# Patient Record
Sex: FEMALE | Race: WHITE | Employment: FULL TIME | URBAN - METROPOLITAN AREA
[De-identification: names, ages, dates, MRNs, and addresses within clinical notes are randomized per-mention and may not be internally consistent; named-entity substitution may affect disease eponyms.]

---

## 2017-01-13 ENCOUNTER — OFFICE VISIT (OUTPATIENT)
Dept: FAMILY MEDICINE CLINIC | Age: 57
End: 2017-01-13

## 2017-01-13 VITALS
HEART RATE: 108 BPM | WEIGHT: 208.4 LBS | BODY MASS INDEX: 36.93 KG/M2 | OXYGEN SATURATION: 96 % | HEIGHT: 63 IN | DIASTOLIC BLOOD PRESSURE: 80 MMHG | SYSTOLIC BLOOD PRESSURE: 120 MMHG

## 2017-01-13 DIAGNOSIS — C67.3 MALIGNANT NEOPLASM OF ANTERIOR WALL OF URINARY BLADDER (HCC): ICD-10-CM

## 2017-01-13 DIAGNOSIS — L71.9 ROSACEA: ICD-10-CM

## 2017-01-13 PROCEDURE — 99213 OFFICE O/P EST LOW 20 MIN: CPT | Performed by: FAMILY MEDICINE

## 2017-01-13 ASSESSMENT — ENCOUNTER SYMPTOMS
RHINORRHEA: 0
COUGH: 0
SORE THROAT: 0
DIARRHEA: 0
SHORTNESS OF BREATH: 0

## 2017-01-20 ENCOUNTER — HOSPITAL ENCOUNTER (OUTPATIENT)
Dept: OTHER | Age: 57
Discharge: HOME | End: 2017-01-20
Attending: INTERNAL MEDICINE | Admitting: INTERNAL MEDICINE

## 2017-01-20 DIAGNOSIS — E78.5 HYPERLIPIDEMIA, UNSPECIFIED HYPERLIPIDEMIA TYPE: ICD-10-CM

## 2017-01-20 DIAGNOSIS — E89.0 POSTOPERATIVE HYPOTHYROIDISM: ICD-10-CM

## 2017-01-20 DIAGNOSIS — E55.9 VITAMIN D DEFICIENCY: ICD-10-CM

## 2017-01-20 LAB
A/G RATIO: 1.7 (ref 1.1–2.2)
ALBUMIN SERPL-MCNC: 4.5 G/DL (ref 3.4–5)
ALP BLD-CCNC: 75 U/L (ref 40–129)
ALT SERPL-CCNC: 22 U/L (ref 10–40)
ANION GAP SERPL CALCULATED.3IONS-SCNC: 16 MMOL/L (ref 3–16)
AST SERPL-CCNC: 16 U/L (ref 15–37)
BILIRUB SERPL-MCNC: 0.4 MG/DL (ref 0–1)
BUN BLDV-MCNC: 14 MG/DL (ref 7–20)
CALCIUM SERPL-MCNC: 9.5 MG/DL (ref 8.3–10.6)
CHLORIDE BLD-SCNC: 103 MMOL/L (ref 99–110)
CHOLESTEROL, TOTAL: 139 MG/DL (ref 0–199)
CO2: 24 MMOL/L (ref 21–32)
CREAT SERPL-MCNC: <0.5 MG/DL (ref 0.6–1.1)
GFR AFRICAN AMERICAN: >60
GFR NON-AFRICAN AMERICAN: >60
GLOBULIN: 2.7 G/DL
GLUCOSE BLD-MCNC: 125 MG/DL (ref 70–99)
HDLC SERPL-MCNC: 34 MG/DL (ref 40–60)
LDL CHOLESTEROL CALCULATED: 78 MG/DL
POTASSIUM SERPL-SCNC: 4.4 MMOL/L (ref 3.5–5.1)
SODIUM BLD-SCNC: 143 MMOL/L (ref 136–145)
T3 FREE: 3.1 PG/ML (ref 2.3–4.2)
T4 FREE: 1.2 NG/DL (ref 0.9–1.8)
TOTAL PROTEIN: 7.2 G/DL (ref 6.4–8.2)
TRIGL SERPL-MCNC: 137 MG/DL (ref 0–150)
TSH SERPL DL<=0.05 MIU/L-ACNC: 0.14 UIU/ML (ref 0.27–4.2)
VITAMIN D 25-HYDROXY: 40 NG/ML
VLDLC SERPL CALC-MCNC: 27 MG/DL

## 2017-01-21 LAB
ESTIMATED AVERAGE GLUCOSE: 159.9 MG/DL
HBA1C MFR BLD: 7.2 %

## 2017-01-22 LAB — T3 REVERSE: 20.1 NG/DL (ref 9–27)

## 2017-01-23 ENCOUNTER — OFFICE VISIT (OUTPATIENT)
Dept: ENDOCRINOLOGY | Age: 57
End: 2017-01-23

## 2017-01-23 VITALS
SYSTOLIC BLOOD PRESSURE: 124 MMHG | DIASTOLIC BLOOD PRESSURE: 82 MMHG | WEIGHT: 210 LBS | HEART RATE: 77 BPM | BODY MASS INDEX: 37.21 KG/M2 | OXYGEN SATURATION: 98 % | HEIGHT: 63 IN

## 2017-01-23 DIAGNOSIS — G62.9 NEUROPATHY: ICD-10-CM

## 2017-01-23 DIAGNOSIS — E89.0 POSTOPERATIVE HYPOTHYROIDISM: ICD-10-CM

## 2017-01-23 DIAGNOSIS — E78.2 MIXED HYPERLIPIDEMIA: ICD-10-CM

## 2017-01-23 DIAGNOSIS — C67.3 MALIGNANT NEOPLASM OF ANTERIOR WALL OF URINARY BLADDER (HCC): ICD-10-CM

## 2017-01-23 DIAGNOSIS — E55.9 VITAMIN D DEFICIENCY: ICD-10-CM

## 2017-01-23 PROCEDURE — 99215 OFFICE O/P EST HI 40 MIN: CPT | Performed by: INTERNAL MEDICINE

## 2017-02-07 ENCOUNTER — TELEPHONE (OUTPATIENT)
Dept: ENDOCRINOLOGY | Age: 57
End: 2017-02-07

## 2017-04-04 ENCOUNTER — HOSPITAL ENCOUNTER (OUTPATIENT)
Dept: OTHER | Age: 57
Discharge: HOME | End: 2017-04-04
Attending: INTERNAL MEDICINE | Admitting: INTERNAL MEDICINE

## 2017-04-04 DIAGNOSIS — G62.9 NEUROPATHY: ICD-10-CM

## 2017-04-04 DIAGNOSIS — E55.9 VITAMIN D DEFICIENCY: ICD-10-CM

## 2017-04-04 DIAGNOSIS — C67.3 MALIGNANT NEOPLASM OF ANTERIOR WALL OF URINARY BLADDER (HCC): ICD-10-CM

## 2017-04-04 DIAGNOSIS — E78.2 MIXED HYPERLIPIDEMIA: ICD-10-CM

## 2017-04-04 DIAGNOSIS — E89.0 POSTOPERATIVE HYPOTHYROIDISM: ICD-10-CM

## 2017-04-04 LAB
A/G RATIO: 1.8 (ref 1.1–2.2)
ALBUMIN SERPL-MCNC: 4.5 G/DL (ref 3.4–5)
ALP BLD-CCNC: 74 U/L (ref 40–129)
ALT SERPL-CCNC: 17 U/L (ref 10–40)
ANION GAP SERPL CALCULATED.3IONS-SCNC: 18 MMOL/L (ref 3–16)
AST SERPL-CCNC: 13 U/L (ref 15–37)
BILIRUB SERPL-MCNC: 0.4 MG/DL (ref 0–1)
BUN BLDV-MCNC: 15 MG/DL (ref 7–20)
CALCIUM SERPL-MCNC: 9.4 MG/DL (ref 8.3–10.6)
CHLORIDE BLD-SCNC: 102 MMOL/L (ref 99–110)
CHOLESTEROL, TOTAL: 152 MG/DL (ref 0–199)
CO2: 21 MMOL/L (ref 21–32)
CREAT SERPL-MCNC: <0.5 MG/DL (ref 0.6–1.1)
FOLATE: 15.97 NG/ML (ref 4.78–24.2)
GFR AFRICAN AMERICAN: >60
GFR NON-AFRICAN AMERICAN: >60
GLOBULIN: 2.5 G/DL
GLUCOSE BLD-MCNC: 140 MG/DL (ref 70–99)
HDLC SERPL-MCNC: 34 MG/DL (ref 40–60)
HOMOCYSTEINE: 7 UMOL/L (ref 0–10)
LDL CHOLESTEROL CALCULATED: 88 MG/DL
POTASSIUM SERPL-SCNC: 4.3 MMOL/L (ref 3.5–5.1)
SODIUM BLD-SCNC: 141 MMOL/L (ref 136–145)
T3 FREE: 2.8 PG/ML (ref 2.3–4.2)
T4 FREE: 1.2 NG/DL (ref 0.9–1.8)
TOTAL PROTEIN: 7 G/DL (ref 6.4–8.2)
TRIGL SERPL-MCNC: 152 MG/DL (ref 0–150)
TSH SERPL DL<=0.05 MIU/L-ACNC: 0.45 UIU/ML (ref 0.27–4.2)
VITAMIN B-12: 263 PG/ML (ref 211–911)
VITAMIN D 25-HYDROXY: 41.4 NG/ML
VLDLC SERPL CALC-MCNC: 30 MG/DL

## 2017-04-05 ENCOUNTER — OFFICE VISIT (OUTPATIENT)
Dept: ENDOCRINOLOGY | Age: 57
End: 2017-04-05

## 2017-04-05 VITALS
BODY MASS INDEX: 37.7 KG/M2 | WEIGHT: 212.8 LBS | HEART RATE: 75 BPM | HEIGHT: 63 IN | DIASTOLIC BLOOD PRESSURE: 84 MMHG | SYSTOLIC BLOOD PRESSURE: 136 MMHG | OXYGEN SATURATION: 97 %

## 2017-04-05 DIAGNOSIS — Z46.81 INSULIN PUMP TITRATION: ICD-10-CM

## 2017-04-05 DIAGNOSIS — E55.9 VITAMIN D DEFICIENCY: ICD-10-CM

## 2017-04-05 DIAGNOSIS — E78.2 MIXED HYPERLIPIDEMIA: ICD-10-CM

## 2017-04-05 LAB
ESTIMATED AVERAGE GLUCOSE: 162.8 MG/DL
HBA1C MFR BLD: 7.3 %

## 2017-04-05 PROCEDURE — 99215 OFFICE O/P EST HI 40 MIN: CPT | Performed by: INTERNAL MEDICINE

## 2017-04-05 ASSESSMENT — PATIENT HEALTH QUESTIONNAIRE - PHQ9
1. LITTLE INTEREST OR PLEASURE IN DOING THINGS: 0
SUM OF ALL RESPONSES TO PHQ QUESTIONS 1-9: 0
2. FEELING DOWN, DEPRESSED OR HOPELESS: 0
SUM OF ALL RESPONSES TO PHQ9 QUESTIONS 1 & 2: 0

## 2017-04-06 LAB
METHYLMALONIC ACID: 0.13 UMOL/L (ref 0–0.4)
T3 REVERSE: 12.1 NG/DL (ref 9–27)

## 2017-07-19 ENCOUNTER — HOSPITAL ENCOUNTER (OUTPATIENT)
Dept: OTHER | Age: 57
Discharge: HOME | End: 2017-07-19
Attending: INTERNAL MEDICINE | Admitting: INTERNAL MEDICINE

## 2017-07-19 DIAGNOSIS — Z46.81 INSULIN PUMP TITRATION: ICD-10-CM

## 2017-07-19 DIAGNOSIS — E78.2 MIXED HYPERLIPIDEMIA: ICD-10-CM

## 2017-07-19 DIAGNOSIS — E55.9 VITAMIN D DEFICIENCY: ICD-10-CM

## 2017-07-19 LAB
A/G RATIO: 1.7 (ref 1.1–2.2)
ALBUMIN SERPL-MCNC: 4.7 G/DL (ref 3.4–5)
ALP BLD-CCNC: 72 U/L (ref 40–129)
ALT SERPL-CCNC: 18 U/L (ref 10–40)
ANION GAP SERPL CALCULATED.3IONS-SCNC: 18 MMOL/L (ref 3–16)
AST SERPL-CCNC: 14 U/L (ref 15–37)
BILIRUB SERPL-MCNC: 0.3 MG/DL (ref 0–1)
BUN BLDV-MCNC: 16 MG/DL (ref 7–20)
CALCIUM SERPL-MCNC: 9.9 MG/DL (ref 8.3–10.6)
CHLORIDE BLD-SCNC: 102 MMOL/L (ref 99–110)
CHOLESTEROL, TOTAL: 149 MG/DL (ref 0–199)
CO2: 23 MMOL/L (ref 21–32)
CORTISOL - AM: 11.2 UG/DL (ref 4.3–22.4)
CREAT SERPL-MCNC: 0.5 MG/DL (ref 0.6–1.1)
CREATININE URINE: 53.8 MG/DL (ref 28–259)
ESTIMATED AVERAGE GLUCOSE: 162.8 MG/DL
GFR AFRICAN AMERICAN: >60
GFR NON-AFRICAN AMERICAN: >60
GLOBULIN: 2.7 G/DL
GLUCOSE BLD-MCNC: 145 MG/DL (ref 70–99)
HBA1C MFR BLD: 7.3 %
HDLC SERPL-MCNC: 34 MG/DL (ref 40–60)
LDL CHOLESTEROL CALCULATED: 88 MG/DL
MICROALBUMIN UR-MCNC: <1.2 MG/DL
MICROALBUMIN/CREAT UR-RTO: NORMAL MG/G (ref 0–30)
POTASSIUM SERPL-SCNC: 5 MMOL/L (ref 3.5–5.1)
SODIUM BLD-SCNC: 143 MMOL/L (ref 136–145)
TOTAL PROTEIN: 7.4 G/DL (ref 6.4–8.2)
TRIGL SERPL-MCNC: 135 MG/DL (ref 0–150)
VLDLC SERPL CALC-MCNC: 27 MG/DL

## 2017-07-20 LAB — ADRENOCORTICOTROPIC HORMONE: 22 PG/ML (ref 6–58)

## 2017-07-21 ENCOUNTER — OFFICE VISIT (OUTPATIENT)
Dept: ENDOCRINOLOGY | Age: 57
End: 2017-07-21

## 2017-07-21 VITALS
HEART RATE: 73 BPM | DIASTOLIC BLOOD PRESSURE: 70 MMHG | HEIGHT: 63 IN | OXYGEN SATURATION: 97 % | BODY MASS INDEX: 38.06 KG/M2 | SYSTOLIC BLOOD PRESSURE: 118 MMHG | WEIGHT: 214.8 LBS

## 2017-07-21 DIAGNOSIS — G62.9 NEUROPATHY: ICD-10-CM

## 2017-07-21 DIAGNOSIS — E89.0 POSTOPERATIVE HYPOTHYROIDISM: ICD-10-CM

## 2017-07-21 DIAGNOSIS — R10.9 FLANK PAIN: ICD-10-CM

## 2017-07-21 DIAGNOSIS — E55.9 VITAMIN D DEFICIENCY: ICD-10-CM

## 2017-07-21 DIAGNOSIS — H15.101 EPISCLERITIS, RIGHT: ICD-10-CM

## 2017-07-21 DIAGNOSIS — Z46.81 INSULIN PUMP TITRATION: ICD-10-CM

## 2017-07-21 LAB
BACTERIA: ABNORMAL /HPF
BILIRUBIN URINE: NEGATIVE
BLOOD, URINE: NEGATIVE
CLARITY: CLEAR
COLOR: YELLOW
EPITHELIAL CELLS, UA: 4 /HPF (ref 0–5)
GLUCOSE URINE: >=1000 MG/DL
HYALINE CASTS: 0 /LPF (ref 0–8)
KETONES, URINE: NEGATIVE MG/DL
LEUKOCYTE ESTERASE, URINE: NEGATIVE
MICROSCOPIC EXAMINATION: ABNORMAL
NITRITE, URINE: NEGATIVE
PH UA: 6
PROTEIN UA: NEGATIVE MG/DL
RBC UA: 3 /HPF (ref 0–4)
RHEUMATOID FACTOR: <10 IU/ML
SEDIMENTATION RATE, ERYTHROCYTE: 19 MM/HR (ref 0–30)
SPECIFIC GRAVITY UA: >1.03
UROBILINOGEN, URINE: 0.2 E.U./DL
WBC UA: 4 /HPF (ref 0–5)

## 2017-07-21 PROCEDURE — 99215 OFFICE O/P EST HI 40 MIN: CPT | Performed by: INTERNAL MEDICINE

## 2017-07-21 ASSESSMENT — PATIENT HEALTH QUESTIONNAIRE - PHQ9
1. LITTLE INTEREST OR PLEASURE IN DOING THINGS: 0
SUM OF ALL RESPONSES TO PHQ QUESTIONS 1-9: 1
SUM OF ALL RESPONSES TO PHQ9 QUESTIONS 1 & 2: 1
2. FEELING DOWN, DEPRESSED OR HOPELESS: 1

## 2017-07-23 LAB — CCP IGG ANTIBODIES: 5 UNITS (ref 0–19)

## 2017-07-24 LAB
ANA INTERPRETATION: NORMAL
ANTI-NUCLEAR ANTIBODY (ANA): NEGATIVE

## 2017-08-08 RX ORDER — ERGOCALCIFEROL 1.25 MG/1
CAPSULE ORAL
Qty: 20 CAPSULE | Refills: 2 | Status: SHIPPED | OUTPATIENT
Start: 2017-08-08 | End: 2019-06-04 | Stop reason: SDUPTHER

## 2017-08-14 RX ORDER — LISINOPRIL 2.5 MG/1
TABLET ORAL
Qty: 90 TABLET | Refills: 0 | Status: SHIPPED | OUTPATIENT
Start: 2017-08-14 | End: 2018-12-03

## 2017-08-14 RX ORDER — LEVOTHYROXINE SODIUM 112 UG/1
TABLET ORAL
Qty: 90 TABLET | Refills: 0 | Status: SHIPPED | OUTPATIENT
Start: 2017-08-14 | End: 2019-03-21 | Stop reason: ALTCHOICE

## 2017-11-06 ENCOUNTER — HOSPITAL ENCOUNTER (OUTPATIENT)
Dept: OTHER | Age: 57
Discharge: HOME | End: 2017-11-06
Attending: INTERNAL MEDICINE | Admitting: INTERNAL MEDICINE

## 2017-11-06 DIAGNOSIS — H15.101 EPISCLERITIS, RIGHT: ICD-10-CM

## 2017-11-06 DIAGNOSIS — Z46.81 INSULIN PUMP TITRATION: ICD-10-CM

## 2017-11-06 DIAGNOSIS — E55.9 VITAMIN D DEFICIENCY: ICD-10-CM

## 2017-11-06 DIAGNOSIS — R10.9 FLANK PAIN: ICD-10-CM

## 2017-11-06 DIAGNOSIS — E89.0 POSTOPERATIVE HYPOTHYROIDISM: ICD-10-CM

## 2017-11-06 DIAGNOSIS — G62.9 NEUROPATHY: ICD-10-CM

## 2017-11-06 LAB
A/G RATIO: 1.7 (ref 1.1–2.2)
ALBUMIN SERPL-MCNC: 4.6 G/DL (ref 3.4–5)
ALP BLD-CCNC: 77 U/L (ref 40–129)
ALT SERPL-CCNC: 17 U/L (ref 10–40)
ANION GAP SERPL CALCULATED.3IONS-SCNC: 16 MMOL/L (ref 3–16)
AST SERPL-CCNC: 14 U/L (ref 15–37)
BILIRUB SERPL-MCNC: 0.5 MG/DL (ref 0–1)
BUN BLDV-MCNC: 15 MG/DL (ref 7–20)
CALCIUM SERPL-MCNC: 9.7 MG/DL (ref 8.3–10.6)
CHLORIDE BLD-SCNC: 101 MMOL/L (ref 99–110)
CHOLESTEROL, TOTAL: 185 MG/DL (ref 0–199)
CO2: 23 MMOL/L (ref 21–32)
CREAT SERPL-MCNC: 0.6 MG/DL (ref 0.6–1.1)
ESTIMATED AVERAGE GLUCOSE: 171.4 MG/DL
GFR AFRICAN AMERICAN: >60
GFR NON-AFRICAN AMERICAN: >60
GLOBULIN: 2.7 G/DL
GLUCOSE BLD-MCNC: 162 MG/DL (ref 70–99)
HBA1C MFR BLD: 7.6 %
HDLC SERPL-MCNC: 39 MG/DL (ref 40–60)
HOMOCYSTEINE: 7 UMOL/L (ref 0–10)
LDL CHOLESTEROL CALCULATED: 102 MG/DL
POTASSIUM SERPL-SCNC: 4.5 MMOL/L (ref 3.5–5.1)
SODIUM BLD-SCNC: 140 MMOL/L (ref 136–145)
TOTAL PROTEIN: 7.3 G/DL (ref 6.4–8.2)
TRIGL SERPL-MCNC: 222 MG/DL (ref 0–150)
TSH SERPL DL<=0.05 MIU/L-ACNC: 1.07 UIU/ML (ref 0.27–4.2)
VITAMIN D 25-HYDROXY: 42.5 NG/ML
VLDLC SERPL CALC-MCNC: 44 MG/DL

## 2018-02-01 ENCOUNTER — HOSPITAL ENCOUNTER (OUTPATIENT)
Dept: OTHER | Age: 58
Discharge: HOME | End: 2018-02-01
Attending: INTERNAL MEDICINE | Admitting: INTERNAL MEDICINE

## 2018-02-01 ENCOUNTER — HOSPITAL ENCOUNTER (OUTPATIENT)
Dept: OTHER | Age: 58
Discharge: HOME | End: 2018-02-28
Attending: INTERNAL MEDICINE | Admitting: INTERNAL MEDICINE

## 2018-02-01 LAB
A/G RATIO: 1.8 (ref 1.1–2.2)
ALBUMIN SERPL-MCNC: 5.1 G/DL (ref 3.4–5)
ALP BLD-CCNC: 84 U/L (ref 40–129)
ALT SERPL-CCNC: 20 U/L (ref 10–40)
ANION GAP SERPL CALCULATED.3IONS-SCNC: 17 MMOL/L (ref 3–16)
AST SERPL-CCNC: 13 U/L (ref 15–37)
BILIRUB SERPL-MCNC: 0.6 MG/DL (ref 0–1)
BUN BLDV-MCNC: 14 MG/DL (ref 7–20)
CALCIUM SERPL-MCNC: 9.8 MG/DL (ref 8.3–10.6)
CHLORIDE BLD-SCNC: 100 MMOL/L (ref 99–110)
CHOLESTEROL, TOTAL: 140 MG/DL (ref 0–199)
CO2: 25 MMOL/L (ref 21–32)
CREAT SERPL-MCNC: <0.5 MG/DL (ref 0.6–1.1)
ESTIMATED AVERAGE GLUCOSE: 162.8 MG/DL
GFR AFRICAN AMERICAN: >60
GFR NON-AFRICAN AMERICAN: >60
GLOBULIN: 2.9 G/DL
GLUCOSE BLD-MCNC: 145 MG/DL (ref 70–99)
HBA1C MFR BLD: 7.3 %
HDLC SERPL-MCNC: 32 MG/DL (ref 40–60)
LDL CHOLESTEROL CALCULATED: 81 MG/DL
POTASSIUM SERPL-SCNC: 4.9 MMOL/L (ref 3.5–5.1)
SODIUM BLD-SCNC: 142 MMOL/L (ref 136–145)
TOTAL PROTEIN: 8 G/DL (ref 6.4–8.2)
TRIGL SERPL-MCNC: 133 MG/DL (ref 0–150)
VITAMIN D 25-HYDROXY: 52.4 NG/ML
VLDLC SERPL CALC-MCNC: 27 MG/DL

## 2018-02-03 ENCOUNTER — HOSPITAL ENCOUNTER (OUTPATIENT)
Dept: OTHER | Age: 58
Discharge: HOME | End: 2018-02-03
Attending: INTERNAL MEDICINE | Admitting: INTERNAL MEDICINE

## 2018-02-05 LAB — CORTISOL SALIVARY: 0.21 UG/DL

## 2018-03-01 ENCOUNTER — HOSPITAL ENCOUNTER (OUTPATIENT)
Dept: OTHER | Age: 58
Discharge: HOME | End: 2018-03-31
Attending: INTERNAL MEDICINE | Admitting: INTERNAL MEDICINE

## 2018-04-20 ENCOUNTER — HOSPITAL ENCOUNTER (OUTPATIENT)
Dept: ENDOSCOPY | Age: 58
Discharge: HOME | End: 2018-04-20
Attending: INTERNAL MEDICINE | Admitting: INTERNAL MEDICINE

## 2018-04-20 LAB
GLUCOSE BLD-MCNC: 130 MG/DL (ref 70–99)
GLUCOSE BLD-MCNC: 81 MG/DL (ref 70–99)
PERFORMED ON: ABNORMAL
PERFORMED ON: NORMAL

## 2018-06-08 ENCOUNTER — HOSPITAL ENCOUNTER (OUTPATIENT)
Dept: OTHER | Age: 58
Discharge: HOME | End: 2018-06-08
Attending: INTERNAL MEDICINE | Admitting: INTERNAL MEDICINE

## 2018-06-08 LAB
A/G RATIO: 1.8 (ref 1.1–2.2)
ALBUMIN SERPL-MCNC: 4.9 G/DL (ref 3.4–5)
ALP BLD-CCNC: 74 U/L (ref 40–129)
ALT SERPL-CCNC: 13 U/L (ref 10–40)
ANION GAP SERPL CALCULATED.3IONS-SCNC: 18 MMOL/L (ref 3–16)
AST SERPL-CCNC: 10 U/L (ref 15–37)
BILIRUB SERPL-MCNC: 0.5 MG/DL (ref 0–1)
BUN BLDV-MCNC: 20 MG/DL (ref 7–20)
CALCIUM SERPL-MCNC: 9.5 MG/DL (ref 8.3–10.6)
CHLORIDE BLD-SCNC: 102 MMOL/L (ref 99–110)
CHOLESTEROL, TOTAL: 183 MG/DL (ref 0–199)
CO2: 25 MMOL/L (ref 21–32)
CREAT SERPL-MCNC: 0.5 MG/DL (ref 0.6–1.1)
ESTIMATED AVERAGE GLUCOSE: 180 MG/DL
GFR AFRICAN AMERICAN: >60
GFR NON-AFRICAN AMERICAN: >60
GLOBULIN: 2.8 G/DL
GLUCOSE BLD-MCNC: 156 MG/DL (ref 70–99)
HBA1C MFR BLD: 7.9 %
HDLC SERPL-MCNC: 38 MG/DL (ref 40–60)
LDL CHOLESTEROL CALCULATED: 108 MG/DL
POTASSIUM SERPL-SCNC: 4.6 MMOL/L (ref 3.5–5.1)
SODIUM BLD-SCNC: 145 MMOL/L (ref 136–145)
T3 FREE: 2.6 PG/ML (ref 2.3–4.2)
T4 FREE: 1.3 NG/DL (ref 0.9–1.8)
TOTAL PROTEIN: 7.7 G/DL (ref 6.4–8.2)
TRIGL SERPL-MCNC: 184 MG/DL (ref 0–150)
TSH SERPL DL<=0.05 MIU/L-ACNC: 1.87 UIU/ML (ref 0.27–4.2)
VITAMIN D 25-HYDROXY: 81.5 NG/ML
VLDLC SERPL CALC-MCNC: 37 MG/DL

## 2018-07-21 ENCOUNTER — HOSPITAL ENCOUNTER (OUTPATIENT)
Dept: OTHER | Age: 58
Discharge: HOME | End: 2018-07-21
Attending: NURSE PRACTITIONER | Admitting: NURSE PRACTITIONER

## 2018-07-21 LAB
A/G RATIO: 1.6 (ref 1.1–2.2)
ALBUMIN SERPL-MCNC: 4.7 G/DL (ref 3.4–5)
ALP BLD-CCNC: 114 U/L (ref 40–129)
ALT SERPL-CCNC: 73 U/L (ref 10–40)
ANION GAP SERPL CALCULATED.3IONS-SCNC: 18 MMOL/L (ref 3–16)
AST SERPL-CCNC: 35 U/L (ref 15–37)
BASOPHILS ABSOLUTE: 0.1 K/UL (ref 0–0.2)
BASOPHILS RELATIVE PERCENT: 1.2 %
BILIRUB SERPL-MCNC: 0.4 MG/DL (ref 0–1)
BUN BLDV-MCNC: 19 MG/DL (ref 7–20)
CALCIUM SERPL-MCNC: 9.6 MG/DL (ref 8.3–10.6)
CHLORIDE BLD-SCNC: 100 MMOL/L (ref 99–110)
CO2: 21 MMOL/L (ref 21–32)
CREAT SERPL-MCNC: 1.1 MG/DL (ref 0.6–1.1)
EOSINOPHILS ABSOLUTE: 0.2 K/UL (ref 0–0.6)
EOSINOPHILS RELATIVE PERCENT: 1.9 %
GFR AFRICAN AMERICAN: >60
GFR NON-AFRICAN AMERICAN: 51
GLOBULIN: 3 G/DL
GLUCOSE BLD-MCNC: 205 MG/DL (ref 70–99)
HCT VFR BLD CALC: 41.8 % (ref 36–48)
HEMOGLOBIN: 13.8 G/DL (ref 12–16)
LYMPHOCYTES ABSOLUTE: 1.8 K/UL (ref 1–5.1)
LYMPHOCYTES RELATIVE PERCENT: 20.6 %
MCH RBC QN AUTO: 28.6 PG (ref 26–34)
MCHC RBC AUTO-ENTMCNC: 33.1 G/DL (ref 31–36)
MCV RBC AUTO: 86.4 FL (ref 80–100)
MONOCYTES ABSOLUTE: 0.7 K/UL (ref 0–1.3)
MONOCYTES RELATIVE PERCENT: 8.3 %
NEUTROPHILS ABSOLUTE: 5.9 K/UL (ref 1.7–7.7)
NEUTROPHILS RELATIVE PERCENT: 68 %
PDW BLD-RTO: 14.9 % (ref 12.4–15.4)
PLATELET # BLD: 319 K/UL (ref 135–450)
PMV BLD AUTO: 9.2 FL (ref 5–10.5)
POTASSIUM SERPL-SCNC: 5.3 MMOL/L (ref 3.5–5.1)
RBC # BLD: 4.84 M/UL (ref 4–5.2)
RHEUMATOID FACTOR: <10 IU/ML
SEDIMENTATION RATE, ERYTHROCYTE: 10 MM/HR (ref 0–30)
SODIUM BLD-SCNC: 139 MMOL/L (ref 136–145)
TOTAL PROTEIN: 7.7 G/DL (ref 6.4–8.2)
WBC # BLD: 8.7 K/UL (ref 4–11)

## 2018-07-22 LAB
ENA TO RNP ANTIBODY: NEGATIVE EU
ENA TO SMITH (SM) ANTIBODY: NEGATIVE EU
ENA TO SSA (RO) ANTIBODY: NEGATIVE EU
ENA TO SSB (LA) ANTIBODY: NEGATIVE EU
PARVOVIRUS B19 IGG ANTIBODY: 6.78 IV
PARVOVIRUS B19 IGM ANTIBODY: 0.2 IV

## 2018-07-24 ENCOUNTER — TELEPHONE (OUTPATIENT)
Dept: FAMILY MEDICINE CLINIC | Age: 58
End: 2018-07-24

## 2018-07-25 ENCOUNTER — OFFICE VISIT (OUTPATIENT)
Dept: FAMILY MEDICINE CLINIC | Age: 58
End: 2018-07-25

## 2018-07-25 VITALS
WEIGHT: 208.4 LBS | HEART RATE: 73 BPM | HEIGHT: 63 IN | SYSTOLIC BLOOD PRESSURE: 130 MMHG | OXYGEN SATURATION: 97 % | BODY MASS INDEX: 36.93 KG/M2 | DIASTOLIC BLOOD PRESSURE: 80 MMHG

## 2018-07-25 DIAGNOSIS — I10 ESSENTIAL HYPERTENSION: ICD-10-CM

## 2018-07-25 PROCEDURE — 99213 OFFICE O/P EST LOW 20 MIN: CPT | Performed by: FAMILY MEDICINE

## 2018-07-25 RX ORDER — EMPAGLIFLOZIN 25 MG/1
25 TABLET, FILM COATED ORAL DAILY
COMMUNITY
Start: 2018-07-06 | End: 2018-09-26 | Stop reason: SDUPTHER

## 2018-07-25 ASSESSMENT — PATIENT HEALTH QUESTIONNAIRE - PHQ9
SUM OF ALL RESPONSES TO PHQ9 QUESTIONS 1 & 2: 0
SUM OF ALL RESPONSES TO PHQ QUESTIONS 1-9: 0
1. LITTLE INTEREST OR PLEASURE IN DOING THINGS: 0
2. FEELING DOWN, DEPRESSED OR HOPELESS: 0

## 2018-07-25 ASSESSMENT — ENCOUNTER SYMPTOMS
BLURRED VISION: 0
SHORTNESS OF BREATH: 0
ORTHOPNEA: 0

## 2018-07-25 NOTE — PROGRESS NOTES
tablet, Take 2 tablets by mouth daily, Disp: 180 tablet, Rfl: 3    Lorcaserin HCl (BELVIQ) 10 MG TABS, Take 1 tablet by mouth 2 times daily, Disp: 60 tablet, Rfl: 0    glucose blood VI test strips (ANAMARIA CONTOUR NEXT TEST) strip, 1 each by In Vitro route 5 times daily As needed. , Disp: 150 each, Rfl: 4    Exenatide (BYDUREON) 2 MG PEN, Inject 1 Pen into the skin once a week, Disp: 12 Pen, Rfl: 3     has a past medical history of Cystic acne; Diverticulitis large intestine; Diverticulosis of colon; Episcleritis; HTN (hypertension); Hyperemesis gravidarum; Hyperlipidemia; Hyperthyroidism; Malignant neoplasm of anterior wall of urinary bladder ([facility] [address] Pregnancies; Thyroid follicular adenoma; Type II or unspecified type diabetes mellitus without mention of complication, not stated as uncontrolled; and Vitamin D deficiency. Past Surgical History:   Procedure Laterality Date    BLADDER SURGERY  12/01/2016    cancerous tumor removed from bladder    COLONOSCOPY  2012    polyps    OTHER SURGICAL HISTORY  2003    neuro surgery- CTS repair    THYROIDECTOMY  6/7344    UMBILICAL HERNIA REPAIR  5/5/16    with mesh         reports that she has never smoked. She has never used smokeless tobacco. She reports that she drinks about 0.6 oz of alcohol per week . She reports that she does not use drugs. family history includes Alzheimer's Disease in her mother; Diabetes in her mother; Heart Disease (age of onset: 45) in her father. Immunization History   Administered Date(s) Administered    Influenza Virus Vaccine 11/01/2014    Pneumococcal Polysaccharide ([id number]) 10/06/2016    Tdap (Boostrix, Adacel) 09/21/2016    Tetanus 05/05/2004     Review of Systems   Constitutional: Negative for malaise/fatigue. Eyes: Negative for blurred vision. Respiratory: Negative for shortness of breath. Cardiovascular: Negative for chest pain, palpitations, orthopnea and PND. Musculoskeletal: Negative for neck pain. Neurological: Negative for headaches. BP Readings from Last 2 Encounters:   07/25/18 130/80   07/21/17 118/70     Hemoglobin A1C (%)   Date Value   06/08/2018 7.9     Microscopic Examination (no units)   Date Value   07/21/2017 Not Indicated     LDL Calculated (mg/dL)   Date Value   06/08/2018 108 (H)              Tobacco use:  Patient  reports that she has never smoked. She has never used smokeless tobacco.    If Smoker - Cessation materials given? NA    Is Daily aspirin on medication list?:  Yes    Diabetic retinal exam done? Yes   If yes, document in Health Maintenance. Monofilament placed on counter? Yes    Shoes and socks removed? Yes    BP taken with correct size cuff? Yes   Repeated if > 130/80 Yes     Microalbumin performed if applicable? Yes       Objective:   Physical Exam   Constitutional: She is oriented to person, place, and time. She appears well-developed and well-nourished. No distress. HENT:   Mouth/Throat: Oropharynx is clear and moist.   Eyes: Conjunctivae are normal. Pupils are equal, round, and reactive to light. Neck: No JVD present. No tracheal deviation present. No thyromegaly present. Cardiovascular: Normal rate, regular rhythm, normal heart sounds and intact distal pulses. Exam reveals no gallop. No murmur heard. Pulmonary/Chest: Effort normal and breath sounds normal. No stridor. No respiratory distress. She has no wheezes. She has no rales. She exhibits no tenderness. Abdominal: Soft. Bowel sounds are normal. She exhibits no distension and no mass. There is no tenderness. Musculoskeletal: She exhibits no edema or tenderness. Lymphadenopathy:     She has no cervical adenopathy. Neurological: She is alert and oriented to person, place, and time. She displays normal reflexes. No cranial nerve deficit. She exhibits normal muscle tone. Coordination normal.   Skin: Skin is warm and dry. No rash noted. No erythema. No pallor.    Psychiatric: She has a normal mood and

## 2018-09-26 ENCOUNTER — OFFICE VISIT (OUTPATIENT)
Dept: FAMILY MEDICINE CLINIC | Age: 58
End: 2018-09-26
Payer: COMMERCIAL

## 2018-09-26 VITALS
HEART RATE: 74 BPM | SYSTOLIC BLOOD PRESSURE: 110 MMHG | OXYGEN SATURATION: 97 % | WEIGHT: 215.4 LBS | HEIGHT: 63 IN | DIASTOLIC BLOOD PRESSURE: 70 MMHG | BODY MASS INDEX: 38.16 KG/M2

## 2018-09-26 DIAGNOSIS — R07.89 OTHER CHEST PAIN: ICD-10-CM

## 2018-09-26 DIAGNOSIS — R07.89 CHEST PRESSURE: ICD-10-CM

## 2018-09-26 DIAGNOSIS — Z11.59 NEED FOR HEPATITIS C SCREENING TEST: ICD-10-CM

## 2018-09-26 DIAGNOSIS — I10 ESSENTIAL HYPERTENSION: Primary | ICD-10-CM

## 2018-09-26 DIAGNOSIS — Z12.39 BREAST CANCER SCREENING: ICD-10-CM

## 2018-09-26 DIAGNOSIS — E78.2 MIXED HYPERLIPIDEMIA: ICD-10-CM

## 2018-09-26 DIAGNOSIS — E89.0 POSTOPERATIVE HYPOTHYROIDISM: ICD-10-CM

## 2018-09-26 LAB
ANION GAP SERPL CALCULATED.3IONS-SCNC: 19 MMOL/L (ref 3–16)
BUN BLDV-MCNC: 17 MG/DL (ref 7–20)
CALCIUM SERPL-MCNC: 10 MG/DL (ref 8.3–10.6)
CHLORIDE BLD-SCNC: 101 MMOL/L (ref 99–110)
CO2: 23 MMOL/L (ref 21–32)
CREAT SERPL-MCNC: 0.6 MG/DL (ref 0.6–1.1)
CREATININE URINE: 53.7 MG/DL (ref 28–259)
GFR AFRICAN AMERICAN: >60
GFR NON-AFRICAN AMERICAN: >60
GLUCOSE BLD-MCNC: 130 MG/DL (ref 70–99)
HEPATITIS C ANTIBODY INTERPRETATION: NORMAL
MICROALBUMIN UR-MCNC: <1.2 MG/DL
MICROALBUMIN/CREAT UR-RTO: NORMAL MG/G (ref 0–30)
POTASSIUM SERPL-SCNC: 4.6 MMOL/L (ref 3.5–5.1)
SODIUM BLD-SCNC: 143 MMOL/L (ref 136–145)

## 2018-09-26 PROCEDURE — 93000 ELECTROCARDIOGRAM COMPLETE: CPT | Performed by: FAMILY MEDICINE

## 2018-09-26 PROCEDURE — 99214 OFFICE O/P EST MOD 30 MIN: CPT | Performed by: FAMILY MEDICINE

## 2018-09-26 RX ORDER — EMPAGLIFLOZIN 25 MG/1
25 TABLET, FILM COATED ORAL DAILY
Qty: 90 TABLET | Refills: 3 | Status: SHIPPED | OUTPATIENT
Start: 2018-09-26 | End: 2019-09-10 | Stop reason: ALTCHOICE

## 2018-09-26 ASSESSMENT — ENCOUNTER SYMPTOMS
SINUS PRESSURE: 0
SHORTNESS OF BREATH: 0
CONSTIPATION: 0
EYE PAIN: 0
RHINORRHEA: 0
WHEEZING: 0
ABDOMINAL DISTENTION: 0
FACIAL SWELLING: 0
VOICE CHANGE: 0
EYE ITCHING: 0
SORE THROAT: 0
TROUBLE SWALLOWING: 0
ABDOMINAL PAIN: 0
CHEST TIGHTNESS: 0
ANAL BLEEDING: 0
EYE REDNESS: 0
RECTAL PAIN: 0
NAUSEA: 0
BACK PAIN: 0
DIARRHEA: 0
APNEA: 0
VOMITING: 0
STRIDOR: 0
EYE DISCHARGE: 0
CHOKING: 0
COUGH: 0
BLOOD IN STOOL: 0
COLOR CHANGE: 0
PHOTOPHOBIA: 0

## 2018-09-26 NOTE — PROGRESS NOTES
Subjective:     Patient ID: Mickie Tavares is a 65 y.o. female. HPI     Treatment Adherence:   Medication compliance:  compliant most of the time  Diet compliance:  compliant most of the time  Weight trend: fluctuating  Current exercise: no regular exercise  Barriers: none    Diabetes Mellitus Type 2: Current symptoms/problems include none. Home blood sugar records: fasting range: 175  Any episodes of hypoglycemia? no  Daily Aspirin? Yes    Hypertension:  Home blood pressure monitoring: No.  She is adherent to a low sodium diet. Patient denies shortness of breath, headache, lightheadedness, blurred vision, peripheral edema, palpitations, dry cough, fatigue and occ CP. Antihypertensive medication side effects: no medication side effects noted. Use of agents associated with hypertension: none. Hyperlipidemia:  No new myalgias or GI upset on atorvastatin (Lipitor). Allergies   Allergen Reactions    Penicillins        Current Outpatient Prescriptions   Medication Sig Dispense Refill    JARDIANCE 25 MG tablet Take 25 mg by mouth daily 90 tablet 3    metroNIDAZOLE (METROCREAM) 0.75 % cream Apply topically 2 times daily.  60 g 2    insulin lispro (HUMALOG) 100 UNIT/ML injection vial INJECT UP  UNITS SUBCUTANEOUSLY DAILY VIA INSULIN PUMP 3 vial 1    levothyroxine (SYNTHROID) 112 MCG tablet Take 1 tablet by mouth  daily 90 tablet 0    lisinopril (PRINIVIL;ZESTRIL) 2.5 MG tablet Take 1 tablet by mouth  daily 90 tablet 0    vitamin D (ERGOCALCIFEROL) 47287 units CAPS capsule Take 2 capsules by mouth  once weekly 20 capsule 2    atorvastatin (LIPITOR) 10 MG tablet TAKE 1 TABLET BY MOUTH DAILY 90 tablet 3    metFORMIN (GLUCOPHAGE) 1000 MG tablet TAKE 1 TABLET TWICE A DAY WITH FOOD 180 tablet 2    liothyronine (CYTOMEL) 5 MCG tablet Take 2 tablets by mouth daily 180 tablet 3    Lorcaserin HCl (BELVIQ) 10 MG TABS Take 1 tablet by mouth 2 times daily 60 tablet 0    glucose blood VI test strips (ANAMARIA CONTOUR NEXT TEST) strip 1 each by In Vitro route 5 times daily As needed. 150 each 4    Exenatide (BYDUREON) 2 MG PEN Inject 1 Pen into the skin once a week 12 Pen 3     No current facility-administered medications for this visit.         Past Medical History:   Diagnosis Date    Cystic acne     Diverticulitis large intestine 7/7/2010    Diverticulosis of colon     Episcleritis     HTN (hypertension) 12/6/2012    Hyperemesis gravidarum     Hyperlipidemia     Hyperthyroidism     Malignant neoplasm of anterior wall of urinary bladder ([facility] [address] 1/13/2017    Pregnancies     2/   2-vaginal deliveries    Thyroid follicular adenoma 32/5822    Hurtle Cell adenoma    Type II or unspecified type diabetes mellitus without mention of complication, not stated as uncontrolled     Vitamin D deficiency 11/2012       Past Surgical History:   Procedure Laterality Date    BLADDER SURGERY  12/01/2016    cancerous tumor removed from bladder    COLONOSCOPY  2012    polyps    OTHER SURGICAL HISTORY  2003    neuro surgery- CTS repair    THYROIDECTOMY  6/3574    UMBILICAL HERNIA REPAIR  5/5/16    with mesh        Social History     Social History    Marital status:      Spouse name: N/A    Number of children: 2    Years of education: N/A     Occupational History    [employer]      Social History Main Topics    Smoking status: Never Smoker    Smokeless tobacco: Never Used    Alcohol use 0.6 oz/week     1 Glasses of wine per week    Drug use: No    Sexual activity: Yes     Partners: Male     Other Topics Concern    Not on file     Social History Narrative    Walker    + seatbelts    Happily     Hobbies--none       Family History   Problem Relation Age of Onset    Diabetes Mother     Alzheimer's Disease Mother     Heart Disease Father 45        several more       Immunization History   Administered Date(s) Administered    Influenza Virus Vaccine 11/01/2014

## 2018-09-27 LAB
ESTIMATED AVERAGE GLUCOSE: 191.5 MG/DL
HBA1C MFR BLD: 8.3 %

## 2018-12-03 ENCOUNTER — OFFICE VISIT (OUTPATIENT)
Dept: FAMILY MEDICINE CLINIC | Age: 58
End: 2018-12-03
Payer: COMMERCIAL

## 2018-12-03 VITALS
SYSTOLIC BLOOD PRESSURE: 138 MMHG | TEMPERATURE: 98.9 F | HEIGHT: 63 IN | BODY MASS INDEX: 39.16 KG/M2 | HEART RATE: 66 BPM | WEIGHT: 221 LBS | DIASTOLIC BLOOD PRESSURE: 87 MMHG | OXYGEN SATURATION: 98 % | RESPIRATION RATE: 18 BRPM

## 2018-12-03 DIAGNOSIS — C67.3 MALIGNANT NEOPLASM OF ANTERIOR WALL OF URINARY BLADDER (HCC): ICD-10-CM

## 2018-12-03 DIAGNOSIS — E11.65 UNCONTROLLED TYPE 2 DIABETES MELLITUS WITH HYPERGLYCEMIA (HCC): ICD-10-CM

## 2018-12-03 DIAGNOSIS — I10 ESSENTIAL HYPERTENSION: ICD-10-CM

## 2018-12-03 LAB — HBA1C MFR BLD: 7.8 %

## 2018-12-03 PROCEDURE — 83036 HEMOGLOBIN GLYCOSYLATED A1C: CPT | Performed by: FAMILY MEDICINE

## 2018-12-03 PROCEDURE — 99213 OFFICE O/P EST LOW 20 MIN: CPT | Performed by: FAMILY MEDICINE

## 2018-12-03 RX ORDER — LIOTHYRONINE SODIUM 5 UG/1
5 TABLET ORAL DAILY
Qty: 90 TABLET | Refills: 3 | Status: SHIPPED | OUTPATIENT
Start: 2018-12-03 | End: 2019-09-30 | Stop reason: SDUPTHER

## 2018-12-03 RX ORDER — LOSARTAN POTASSIUM 25 MG/1
25 TABLET ORAL DAILY
Qty: 90 TABLET | Refills: 3 | Status: SHIPPED | OUTPATIENT
Start: 2018-12-03 | End: 2020-01-16 | Stop reason: SDUPTHER

## 2018-12-03 NOTE — PROGRESS NOTES
Vaccine 11/01/2014    Pneumococcal Polysaccharide ([id number]) 10/06/2016    Tdap (Boostrix, Adacel) 09/21/2016    Tetanus 05/05/2004       Review of Systems  Review of Systems    Objective:   Physical Exam  Physical Exam    Assessment and Plan:     Diabetes mellitus type 2, uncontrolled ([facility] [address]  Better--will restart bydureon--work on wgt loss    Essential hypertension  At goal    Malignant neoplasm of anterior wall of urinary bladder ([facility])  Recent recurrence--

## 2019-03-21 ENCOUNTER — OFFICE VISIT (OUTPATIENT)
Dept: FAMILY MEDICINE CLINIC | Age: 59
End: 2019-03-21
Payer: COMMERCIAL

## 2019-03-21 VITALS
SYSTOLIC BLOOD PRESSURE: 130 MMHG | BODY MASS INDEX: 39.51 KG/M2 | WEIGHT: 223 LBS | RESPIRATION RATE: 16 BRPM | DIASTOLIC BLOOD PRESSURE: 84 MMHG | HEIGHT: 63 IN | TEMPERATURE: 98.3 F | HEART RATE: 78 BPM | OXYGEN SATURATION: 97 %

## 2019-03-21 DIAGNOSIS — E11.65 UNCONTROLLED TYPE 2 DIABETES MELLITUS WITH HYPERGLYCEMIA (HCC): Primary | ICD-10-CM

## 2019-03-21 DIAGNOSIS — I10 ESSENTIAL HYPERTENSION: ICD-10-CM

## 2019-03-21 DIAGNOSIS — E78.2 MIXED HYPERLIPIDEMIA: ICD-10-CM

## 2019-03-21 DIAGNOSIS — E89.0 POSTOPERATIVE HYPOTHYROIDISM: ICD-10-CM

## 2019-03-21 PROCEDURE — 99214 OFFICE O/P EST MOD 30 MIN: CPT | Performed by: FAMILY MEDICINE

## 2019-03-21 RX ORDER — LEVOTHYROXINE SODIUM 0.12 MG/1
125 TABLET ORAL DAILY
COMMUNITY
End: 2019-06-04 | Stop reason: SDUPTHER

## 2019-03-21 ASSESSMENT — ENCOUNTER SYMPTOMS
EYE DISCHARGE: 0
EYE REDNESS: 0
CHOKING: 0
CHEST TIGHTNESS: 0
CONSTIPATION: 0
COLOR CHANGE: 0
WHEEZING: 0
EYE PAIN: 0
VOMITING: 0
COUGH: 0
EYE ITCHING: 0
NAUSEA: 0
FACIAL SWELLING: 0
SORE THROAT: 0
SINUS PRESSURE: 0
ANAL BLEEDING: 0
PHOTOPHOBIA: 0
BACK PAIN: 0
TROUBLE SWALLOWING: 0
BLOOD IN STOOL: 0
RHINORRHEA: 0
ABDOMINAL PAIN: 0
VOICE CHANGE: 0
DIARRHEA: 0
APNEA: 0
SHORTNESS OF BREATH: 0
RECTAL PAIN: 0
STRIDOR: 0
ABDOMINAL DISTENTION: 0

## 2019-03-22 ENCOUNTER — HOSPITAL ENCOUNTER (OUTPATIENT)
Age: 59
Discharge: HOME | End: 2019-03-22
Payer: COMMERCIAL

## 2019-03-22 DIAGNOSIS — E89.0 POSTOPERATIVE HYPOTHYROIDISM: ICD-10-CM

## 2019-03-22 DIAGNOSIS — E78.2 MIXED HYPERLIPIDEMIA: ICD-10-CM

## 2019-03-22 DIAGNOSIS — E11.65 UNCONTROLLED TYPE 2 DIABETES MELLITUS WITH HYPERGLYCEMIA (HCC): ICD-10-CM

## 2019-03-22 DIAGNOSIS — I10 ESSENTIAL HYPERTENSION: ICD-10-CM

## 2019-03-22 LAB
A/G RATIO: 1.7 (ref 1.1–2.2)
ALBUMIN SERPL-MCNC: 4.7 G/DL (ref 3.4–5)
ALP BLD-CCNC: 69 U/L (ref 40–129)
ALT SERPL-CCNC: 14 U/L (ref 10–40)
ANION GAP SERPL CALCULATED.3IONS-SCNC: 16 MMOL/L (ref 3–16)
AST SERPL-CCNC: 12 U/L (ref 15–37)
BASOPHILS ABSOLUTE: 0 K/UL (ref 0–0.2)
BASOPHILS RELATIVE PERCENT: 0.4 %
BILIRUB SERPL-MCNC: 0.5 MG/DL (ref 0–1)
BUN BLDV-MCNC: 18 MG/DL (ref 7–20)
CALCIUM SERPL-MCNC: 9.4 MG/DL (ref 8.3–10.6)
CHLORIDE BLD-SCNC: 106 MMOL/L (ref 99–110)
CHOLESTEROL, TOTAL: 141 MG/DL (ref 0–199)
CO2: 23 MMOL/L (ref 21–32)
CREAT SERPL-MCNC: <0.5 MG/DL (ref 0.6–1.1)
CREATININE URINE: 58.1 MG/DL (ref 28–259)
EOSINOPHILS ABSOLUTE: 0.1 K/UL (ref 0–0.6)
EOSINOPHILS RELATIVE PERCENT: 2.3 %
GFR AFRICAN AMERICAN: >60
GFR NON-AFRICAN AMERICAN: >60
GLOBULIN: 2.8 G/DL
GLUCOSE BLD-MCNC: 155 MG/DL (ref 70–99)
HCT VFR BLD CALC: 40.2 % (ref 36–48)
HDLC SERPL-MCNC: 37 MG/DL (ref 40–60)
HEMOGLOBIN: 13.1 G/DL (ref 12–16)
LDL CHOLESTEROL CALCULATED: 76 MG/DL
LYMPHOCYTES ABSOLUTE: 1.7 K/UL (ref 1–5.1)
LYMPHOCYTES RELATIVE PERCENT: 27.8 %
MCH RBC QN AUTO: 28.8 PG (ref 26–34)
MCHC RBC AUTO-ENTMCNC: 32.7 G/DL (ref 31–36)
MCV RBC AUTO: 87.9 FL (ref 80–100)
MICROALBUMIN UR-MCNC: <1.2 MG/DL
MICROALBUMIN/CREAT UR-RTO: NORMAL MG/G (ref 0–30)
MONOCYTES ABSOLUTE: 0.6 K/UL (ref 0–1.3)
MONOCYTES RELATIVE PERCENT: 10 %
NEUTROPHILS ABSOLUTE: 3.7 K/UL (ref 1.7–7.7)
NEUTROPHILS RELATIVE PERCENT: 59.5 %
PDW BLD-RTO: 15.1 % (ref 12.4–15.4)
PLATELET # BLD: 282 K/UL (ref 135–450)
PMV BLD AUTO: 8.7 FL (ref 5–10.5)
POTASSIUM SERPL-SCNC: 4.5 MMOL/L (ref 3.5–5.1)
RBC # BLD: 4.57 M/UL (ref 4–5.2)
SODIUM BLD-SCNC: 145 MMOL/L (ref 136–145)
TOTAL PROTEIN: 7.5 G/DL (ref 6.4–8.2)
TRIGL SERPL-MCNC: 142 MG/DL (ref 0–150)
TSH SERPL DL<=0.05 MIU/L-ACNC: 2.68 UIU/ML (ref 0.27–4.2)
VLDLC SERPL CALC-MCNC: 28 MG/DL
WBC # BLD: 6.3 K/UL (ref 4–11)

## 2019-03-22 PROCEDURE — 84443 ASSAY THYROID STIM HORMONE: CPT

## 2019-03-22 PROCEDURE — 36415 COLL VENOUS BLD VENIPUNCTURE: CPT

## 2019-03-22 PROCEDURE — 83036 HEMOGLOBIN GLYCOSYLATED A1C: CPT

## 2019-03-22 PROCEDURE — 82570 ASSAY OF URINE CREATININE: CPT

## 2019-03-22 PROCEDURE — 80053 COMPREHEN METABOLIC PANEL: CPT

## 2019-03-22 PROCEDURE — 80061 LIPID PANEL: CPT

## 2019-03-22 PROCEDURE — 85025 COMPLETE CBC W/AUTO DIFF WBC: CPT

## 2019-03-22 PROCEDURE — 82043 UR ALBUMIN QUANTITATIVE: CPT

## 2019-03-23 LAB
ESTIMATED AVERAGE GLUCOSE: 180 MG/DL
HBA1C MFR BLD: 7.9 %

## 2019-06-04 RX ORDER — ERGOCALCIFEROL 1.25 MG/1
CAPSULE ORAL
Qty: 20 CAPSULE | Refills: 2 | Status: SHIPPED | OUTPATIENT
Start: 2019-06-04 | End: 2020-10-12 | Stop reason: SDUPTHER

## 2019-06-04 RX ORDER — LEVOTHYROXINE SODIUM 0.12 MG/1
125 TABLET ORAL DAILY
Qty: 90 TABLET | Refills: 3 | Status: SHIPPED | OUTPATIENT
Start: 2019-06-04 | End: 2020-06-01 | Stop reason: SDUPTHER

## 2019-06-04 NOTE — TELEPHONE ENCOUNTER
New rx --patient reported    Last seen 3/21/2019  Next office visit   Visit date not found      Please advise

## 2019-08-06 ENCOUNTER — HOSPITAL ENCOUNTER (OUTPATIENT)
Dept: MAMMOGRAPHY | Age: 59
Discharge: HOME | End: 2019-08-06
Payer: COMMERCIAL

## 2019-08-06 DIAGNOSIS — Z12.31 VISIT FOR SCREENING MAMMOGRAM: ICD-10-CM

## 2019-08-06 PROCEDURE — 77063 BREAST TOMOSYNTHESIS BI: CPT

## 2019-09-10 ENCOUNTER — OFFICE VISIT (OUTPATIENT)
Dept: FAMILY MEDICINE CLINIC | Age: 59
End: 2019-09-10
Payer: COMMERCIAL

## 2019-09-10 VITALS
SYSTOLIC BLOOD PRESSURE: 140 MMHG | HEART RATE: 99 BPM | OXYGEN SATURATION: 98 % | WEIGHT: 225.6 LBS | BODY MASS INDEX: 39.96 KG/M2 | DIASTOLIC BLOOD PRESSURE: 84 MMHG

## 2019-09-10 DIAGNOSIS — E89.0 POSTOPERATIVE HYPOTHYROIDISM: ICD-10-CM

## 2019-09-10 DIAGNOSIS — M25.50 ARTHRALGIA, UNSPECIFIED JOINT: ICD-10-CM

## 2019-09-10 DIAGNOSIS — E11.65 UNCONTROLLED TYPE 2 DIABETES MELLITUS WITH HYPERGLYCEMIA (HCC): ICD-10-CM

## 2019-09-10 DIAGNOSIS — E66.01 CLASS 3 SEVERE OBESITY WITH SERIOUS COMORBIDITY AND BODY MASS INDEX (BMI) OF 40.0 TO 44.9 IN ADULT, UNSPECIFIED OBESITY TYPE (HCC): ICD-10-CM

## 2019-09-10 DIAGNOSIS — R07.89 OTHER CHEST PAIN: Primary | ICD-10-CM

## 2019-09-10 DIAGNOSIS — R07.89 OTHER CHEST PAIN: ICD-10-CM

## 2019-09-10 DIAGNOSIS — I10 ESSENTIAL HYPERTENSION: ICD-10-CM

## 2019-09-10 LAB
BASOPHILS ABSOLUTE: 0 K/UL (ref 0–0.2)
BASOPHILS RELATIVE PERCENT: 0.2 %
CREATININE URINE: 42.7 MG/DL (ref 28–259)
EOSINOPHILS ABSOLUTE: 0.2 K/UL (ref 0–0.6)
EOSINOPHILS RELATIVE PERCENT: 2.5 %
HCT VFR BLD CALC: 41.2 % (ref 36–48)
HEMOGLOBIN: 13.4 G/DL (ref 12–16)
LYMPHOCYTES ABSOLUTE: 1.6 K/UL (ref 1–5.1)
LYMPHOCYTES RELATIVE PERCENT: 19.8 %
MCH RBC QN AUTO: 28.8 PG (ref 26–34)
MCHC RBC AUTO-ENTMCNC: 32.6 G/DL (ref 31–36)
MCV RBC AUTO: 88.5 FL (ref 80–100)
MICROALBUMIN UR-MCNC: <1.2 MG/DL
MICROALBUMIN/CREAT UR-RTO: NORMAL MG/G (ref 0–30)
MONOCYTES ABSOLUTE: 0.6 K/UL (ref 0–1.3)
MONOCYTES RELATIVE PERCENT: 7.6 %
NEUTROPHILS ABSOLUTE: 5.6 K/UL (ref 1.7–7.7)
NEUTROPHILS RELATIVE PERCENT: 69.9 %
PDW BLD-RTO: 15.1 % (ref 12.4–15.4)
PLATELET # BLD: 285 K/UL (ref 135–450)
PMV BLD AUTO: 8.6 FL (ref 5–10.5)
RBC # BLD: 4.66 M/UL (ref 4–5.2)
WBC # BLD: 8 K/UL (ref 4–11)

## 2019-09-10 PROCEDURE — 93000 ELECTROCARDIOGRAM COMPLETE: CPT | Performed by: FAMILY MEDICINE

## 2019-09-10 PROCEDURE — 99214 OFFICE O/P EST MOD 30 MIN: CPT | Performed by: FAMILY MEDICINE

## 2019-09-10 ASSESSMENT — ENCOUNTER SYMPTOMS
COLOR CHANGE: 0
EYE PAIN: 0
VOICE CHANGE: 0
ANAL BLEEDING: 0
SINUS PRESSURE: 0
ABDOMINAL PAIN: 0
SHORTNESS OF BREATH: 0
RHINORRHEA: 0
CHEST TIGHTNESS: 0
ABDOMINAL DISTENTION: 0
DIARRHEA: 0
TROUBLE SWALLOWING: 0
CHOKING: 0
VOMITING: 0
COUGH: 0
RECTAL PAIN: 0
BLOOD IN STOOL: 0
PHOTOPHOBIA: 0
EYE REDNESS: 0
FACIAL SWELLING: 0
NAUSEA: 0
CONSTIPATION: 0
APNEA: 0
EYE ITCHING: 0
BACK PAIN: 1
EYE DISCHARGE: 0
SORE THROAT: 0
STRIDOR: 0
BLURRED VISION: 1
WHEEZING: 0

## 2019-09-10 ASSESSMENT — PATIENT HEALTH QUESTIONNAIRE - PHQ9
SUM OF ALL RESPONSES TO PHQ9 QUESTIONS 1 & 2: 0
2. FEELING DOWN, DEPRESSED OR HOPELESS: 0
1. LITTLE INTEREST OR PLEASURE IN DOING THINGS: 0
SUM OF ALL RESPONSES TO PHQ QUESTIONS 1-9: 0
SUM OF ALL RESPONSES TO PHQ QUESTIONS 1-9: 0

## 2019-09-10 NOTE — PROGRESS NOTES
tablet 3    losartan (COZAAR) 25 MG tablet Take 1 tablet by mouth daily 90 tablet 3    Exenatide (BYDUREON) 2 MG PEN Inject 1 pen into the skin once a week 12 pen 3    insulin lispro (HUMALOG) 100 UNIT/ML injection vial INJECT UP  UNITS SUBCUTANEOUSLY DAILY VIA INSULIN PUMP 9 vial 3    JARDIANCE 25 MG tablet Take 25 mg by mouth daily 90 tablet 3    glucose blood VI test strips (ANAMARIA CONTOUR NEXT TEST) strip 1 each by In Vitro route 5 times daily As needed. 150 each 4    atorvastatin (LIPITOR) 10 MG tablet TAKE 1 TABLET BY MOUTH DAILY 90 tablet 3    metFORMIN (GLUCOPHAGE) 1000 MG tablet TAKE 1 TABLET TWICE A DAY WITH FOOD 180 tablet 2     No current facility-administered medications for this visit.         Past Medical History:   Diagnosis Date    Cystic acne     Diverticulitis large intestine 7/7/2010    Diverticulosis of colon     Episcleritis     HTN (hypertension) 12/6/2012    Hyperemesis gravidarum     Hyperlipidemia     Hyperthyroidism     Malignant neoplasm of anterior wall of urinary bladder ([facility] [address] 1/13/2017    Pregnancies     2/   2-vaginal deliveries    Thyroid follicular adenoma 06/0223    Hurtle Cell adenoma    Type II or unspecified type diabetes mellitus without mention of complication, not stated as uncontrolled     Vitamin D deficiency 11/2012       Past Surgical History:   Procedure Laterality Date    BLADDER SURGERY  12/01/2016    cancerous tumor removed from bladder    COLONOSCOPY  2012    polyps    OTHER SURGICAL HISTORY  2003    neuro surgery- CTS repair    THYROIDECTOMY  6/8594    UMBILICAL HERNIA REPAIR  5/5/16    with mesh        Social History     Socioeconomic History    Marital status:      Spouse name: Not on file    Number of children: 2    Years of education: Not on file    Highest education level: Not on file   Occupational History    Occupation: [employer]   Social Needs    Financial resource strain: Not on file    Food insecurity:     Worry: Not on file     Inability: Not on file    Transportation needs:     Medical: Not on file     Non-medical: Not on file   Tobacco Use    Smoking status: Never Smoker    Smokeless tobacco: Never Used   Substance and Sexual Activity    Alcohol use: Yes     Alcohol/week: 1.0 standard drinks     Types: 1 Glasses of wine per week    Drug use: No    Sexual activity: Yes     Partners: Male   Lifestyle    Physical activity:     Days per week: Not on file     Minutes per session: Not on file    Stress: Not on file   Relationships    Social connections:     Talks on phone: Not on file     Gets together: Not on file     Attends Congregation service: Not on file     Active member of club or organization: Not on file     Attends meetings of clubs or organizations: Not on file     Relationship status: Not on file    Intimate partner violence:     Fear of current or ex partner: Not on file     Emotionally abused: Not on file     Physically abused: Not on file     Forced sexual activity: Not on file   Other Topics Concern    Not on file   Social History Narrative    Walker    + seatbelts    Happily     Hobbies--none       Family History   Problem Relation Age of Onset    Diabetes Mother     Alzheimer's Disease Mother     Heart Disease Father 45        several more       Immunization History   Administered Date(s) Administered    Influenza Virus Vaccine 11/01/2014    Pneumococcal Polysaccharide ([id number]) 10/06/2016    Tdap (Boostrix, Adacel) 09/21/2016    Tetanus 05/05/2004       Review of Systems  Review of Systems   Constitutional: Positive for fatigue. Negative for activity change, appetite change, chills, diaphoresis, fever and unexpected weight change.    HENT: Negative for congestion, dental problem, drooling, ear discharge, ear pain, facial swelling, hearing loss, mouth sores, nosebleeds, postnasal drip, rhinorrhea, sinus pressure, sneezing, sore throat, tinnitus, respiratory distress. She has no wheezes. She has no rales. She exhibits no tenderness. Abdominal: Soft. Bowel sounds are normal. She exhibits no distension and no mass. There is no tenderness. Musculoskeletal: She exhibits no edema or tenderness. Lymphadenopathy:     She has no cervical adenopathy. Neurological: She is alert and oriented to person, place, and time. She displays normal reflexes. No cranial nerve deficit. She exhibits normal muscle tone. Coordination normal.   Skin: Skin is warm and dry. No rash noted. No erythema. No pallor. Psychiatric: She has a normal mood and affect. Her behavior is normal. Judgment and thought content normal.   Vitals reviewed. EKG: normal EKG, normal sinus rhythm.   Assessment and Plan:     Diabetes mellitus type 2, uncontrolled ([address]  Overeating and will refer to [facility] weight loss center    Essential hypertension  stable    Hypothyroid  labs    Arthralgia  Will get labs

## 2019-09-11 LAB
ANION GAP SERPL CALCULATED.3IONS-SCNC: 16 MMOL/L (ref 3–16)
ANTI-NUCLEAR ANTIBODY (ANA): NEGATIVE
BUN BLDV-MCNC: 17 MG/DL (ref 7–20)
C-REACTIVE PROTEIN: 3 MG/L (ref 0–5.1)
CALCIUM SERPL-MCNC: 10 MG/DL (ref 8.3–10.6)
CHLORIDE BLD-SCNC: 103 MMOL/L (ref 99–110)
CO2: 24 MMOL/L (ref 21–32)
CREAT SERPL-MCNC: 0.6 MG/DL (ref 0.6–1.1)
ESTIMATED AVERAGE GLUCOSE: 171.4 MG/DL
GFR AFRICAN AMERICAN: >60
GFR NON-AFRICAN AMERICAN: >60
GLUCOSE BLD-MCNC: 145 MG/DL (ref 70–99)
HBA1C MFR BLD: 7.6 %
POTASSIUM SERPL-SCNC: 4.8 MMOL/L (ref 3.5–5.1)
RHEUMATOID FACTOR: <10 IU/ML
SODIUM BLD-SCNC: 143 MMOL/L (ref 136–145)
TSH SERPL DL<=0.05 MIU/L-ACNC: 1.32 UIU/ML (ref 0.27–4.2)

## 2019-09-26 RX ORDER — EMPAGLIFLOZIN 25 MG/1
TABLET, FILM COATED ORAL
Qty: 90 TABLET | Refills: 2 | Status: SHIPPED | OUTPATIENT
Start: 2019-09-26 | End: 2020-04-12

## 2019-09-30 RX ORDER — LIOTHYRONINE SODIUM 5 UG/1
TABLET ORAL
Qty: 90 TABLET | Refills: 3 | Status: SHIPPED | OUTPATIENT
Start: 2019-09-30 | End: 2020-09-13

## 2019-12-20 ENCOUNTER — OFFICE VISIT (OUTPATIENT)
Dept: FAMILY MEDICINE CLINIC | Age: 59
End: 2019-12-20
Payer: COMMERCIAL

## 2019-12-20 VITALS
TEMPERATURE: 98 F | HEART RATE: 106 BPM | SYSTOLIC BLOOD PRESSURE: 114 MMHG | WEIGHT: 227.4 LBS | OXYGEN SATURATION: 93 % | BODY MASS INDEX: 40.28 KG/M2 | DIASTOLIC BLOOD PRESSURE: 60 MMHG

## 2019-12-20 DIAGNOSIS — M25.50 ARTHRALGIA, UNSPECIFIED JOINT: ICD-10-CM

## 2019-12-20 DIAGNOSIS — M25.50 ARTHRALGIA, UNSPECIFIED JOINT: Primary | ICD-10-CM

## 2019-12-20 LAB
ANION GAP SERPL CALCULATED.3IONS-SCNC: 19 MMOL/L (ref 3–16)
BASOPHILS ABSOLUTE: 0 K/UL (ref 0–0.2)
BASOPHILS RELATIVE PERCENT: 0.1 %
BUN BLDV-MCNC: 17 MG/DL (ref 7–20)
CALCIUM SERPL-MCNC: 10.4 MG/DL (ref 8.3–10.6)
CHLORIDE BLD-SCNC: 99 MMOL/L (ref 99–110)
CO2: 22 MMOL/L (ref 21–32)
CREAT SERPL-MCNC: <0.5 MG/DL (ref 0.6–1.1)
EOSINOPHILS ABSOLUTE: 0.1 K/UL (ref 0–0.6)
EOSINOPHILS RELATIVE PERCENT: 0.7 %
GFR AFRICAN AMERICAN: >60
GFR NON-AFRICAN AMERICAN: >60
GLUCOSE BLD-MCNC: 146 MG/DL (ref 70–99)
HCT VFR BLD CALC: 41.3 % (ref 36–48)
HEMOGLOBIN: 13.3 G/DL (ref 12–16)
INFLUENZA A ANTIGEN, POC: NORMAL
INFLUENZA B ANTIGEN, POC: NORMAL
LYMPHOCYTES ABSOLUTE: 1.5 K/UL (ref 1–5.1)
LYMPHOCYTES RELATIVE PERCENT: 10.3 %
MCH RBC QN AUTO: 28.6 PG (ref 26–34)
MCHC RBC AUTO-ENTMCNC: 32.2 G/DL (ref 31–36)
MCV RBC AUTO: 88.6 FL (ref 80–100)
MONOCYTES ABSOLUTE: 1 K/UL (ref 0–1.3)
MONOCYTES RELATIVE PERCENT: 7.2 %
NEUTROPHILS ABSOLUTE: 11.5 K/UL (ref 1.7–7.7)
NEUTROPHILS RELATIVE PERCENT: 81.7 %
PDW BLD-RTO: 14.5 % (ref 12.4–15.4)
PLATELET # BLD: 304 K/UL (ref 135–450)
PMV BLD AUTO: 8.6 FL (ref 5–10.5)
POTASSIUM SERPL-SCNC: 4.3 MMOL/L (ref 3.5–5.1)
RBC # BLD: 4.66 M/UL (ref 4–5.2)
SEDIMENTATION RATE, ERYTHROCYTE: 15 MM/HR (ref 0–30)
SODIUM BLD-SCNC: 140 MMOL/L (ref 136–145)
WBC # BLD: 14.1 K/UL (ref 4–11)

## 2019-12-20 PROCEDURE — 99214 OFFICE O/P EST MOD 30 MIN: CPT | Performed by: NURSE PRACTITIONER

## 2019-12-20 PROCEDURE — 87804 INFLUENZA ASSAY W/OPTIC: CPT | Performed by: NURSE PRACTITIONER

## 2019-12-27 ENCOUNTER — TELEPHONE (OUTPATIENT)
Dept: FAMILY MEDICINE CLINIC | Age: 59
End: 2019-12-27

## 2019-12-27 RX ORDER — PEN NEEDLE, DIABETIC 30 GX5/16"
1 NEEDLE, DISPOSABLE MISCELLANEOUS DAILY
Qty: 100 EACH | Refills: 3 | Status: SHIPPED | OUTPATIENT
Start: 2019-12-27 | End: 2020-02-17

## 2019-12-27 RX ORDER — INSULIN LISPRO 100 [IU]/ML
INJECTION, SOLUTION INTRAVENOUS; SUBCUTANEOUS
Qty: 2 PEN | Refills: 2 | Status: SHIPPED | OUTPATIENT
Start: 2019-12-27 | End: 2020-02-17

## 2020-01-16 ENCOUNTER — TELEPHONE (OUTPATIENT)
Dept: FAMILY MEDICINE CLINIC | Age: 60
End: 2020-01-16

## 2020-01-16 ENCOUNTER — OFFICE VISIT (OUTPATIENT)
Dept: FAMILY MEDICINE CLINIC | Age: 60
End: 2020-01-16
Payer: COMMERCIAL

## 2020-01-16 VITALS
BODY MASS INDEX: 40.18 KG/M2 | WEIGHT: 226.8 LBS | HEIGHT: 63 IN | SYSTOLIC BLOOD PRESSURE: 120 MMHG | HEART RATE: 106 BPM | OXYGEN SATURATION: 98 % | DIASTOLIC BLOOD PRESSURE: 80 MMHG

## 2020-01-16 PROCEDURE — 99213 OFFICE O/P EST LOW 20 MIN: CPT | Performed by: FAMILY MEDICINE

## 2020-01-16 RX ORDER — LOSARTAN POTASSIUM 25 MG/1
25 TABLET ORAL DAILY
Qty: 90 TABLET | Refills: 3 | Status: SHIPPED | OUTPATIENT
Start: 2020-01-16 | End: 2020-06-17 | Stop reason: SDUPTHER

## 2020-01-16 ASSESSMENT — ENCOUNTER SYMPTOMS
RHINORRHEA: 0
ABDOMINAL DISTENTION: 0
BLOOD IN STOOL: 0
RECTAL PAIN: 0
SHORTNESS OF BREATH: 0
CHOKING: 0
NAUSEA: 0
CONSTIPATION: 0
VOICE CHANGE: 0
COLOR CHANGE: 0
EYE ITCHING: 0
FACIAL SWELLING: 0
PHOTOPHOBIA: 0
BACK PAIN: 0
APNEA: 0
SORE THROAT: 0
ANAL BLEEDING: 0
EYE DISCHARGE: 0
EYE PAIN: 0
VOMITING: 0
CHEST TIGHTNESS: 0
ABDOMINAL PAIN: 0
DIARRHEA: 0
EYE REDNESS: 0
COUGH: 0
TROUBLE SWALLOWING: 0
STRIDOR: 0
WHEEZING: 0
SINUS PRESSURE: 0

## 2020-01-16 ASSESSMENT — PATIENT HEALTH QUESTIONNAIRE - PHQ9
SUM OF ALL RESPONSES TO PHQ QUESTIONS 1-9: 0
SUM OF ALL RESPONSES TO PHQ9 QUESTIONS 1 & 2: 0
2. FEELING DOWN, DEPRESSED OR HOPELESS: 0
1. LITTLE INTEREST OR PLEASURE IN DOING THINGS: 0
SUM OF ALL RESPONSES TO PHQ QUESTIONS 1-9: 0

## 2020-01-16 NOTE — PROGRESS NOTES
file    Highest education level: Not on file   Occupational History    Occupation: Walk-in Appointment Scheduler,[employer]   Social Needs    Financial resource strain: Not on file    Food insecurity:     Worry: Not on file     Inability: Not on file    Transportation needs:     Medical: Not on file     Non-medical: Not on file   Tobacco Use    Smoking status: Never Smoker    Smokeless tobacco: Never Used   Substance and Sexual Activity    Alcohol use: Yes     Alcohol/week: 1.0 standard drinks     Types: 1 Glasses of wine per week    Drug use: No    Sexual activity: Yes     Partners: Male   Lifestyle    Physical activity:     Days per week: Not on file     Minutes per session: Not on file    Stress: Not on file   Relationships    Social connections:     Talks on phone: Not on file     Gets together: Not on file     Attends Catholic service: Not on file     Active member of club or organization: Not on file     Attends meetings of clubs or organizations: Not on file     Relationship status: Not on file    Intimate partner violence:     Fear of current or ex partner: Not on file     Emotionally abused: Not on file     Physically abused: Not on file     Forced sexual activity: Not on file   Other Topics Concern    Not on file   Social History Narrative    Walker    + seatbelts    Happily     Hobbies--none       Family History   Problem Relation Age of Onset    Diabetes Mother     Alzheimer's Disease Mother     Heart Disease Father 45        several more       Immunization History   Administered Date(s) Administered    Influenza Virus Vaccine 11/01/2014, 10/25/2019    Pneumococcal Polysaccharide ([id number]) 10/06/2016    Tdap (Boostrix, Adacel) 09/21/2016    Tetanus 05/05/2004       Review of Systems  Review of Systems   Constitutional: Negative for activity change, appetite change, chills, diaphoresis, fatigue, fever and unexpected weight change.    HENT: Negative for congestion, dental problem, drooling, ear discharge, ear pain, facial swelling, hearing loss, mouth sores, nosebleeds, postnasal drip, rhinorrhea, sinus pressure, sneezing, sore throat, tinnitus, trouble swallowing and voice change. Eyes: Negative for photophobia, pain, discharge, redness, itching and visual disturbance. Respiratory: Negative for apnea, cough, choking, chest tightness, shortness of breath, wheezing and stridor. Cardiovascular: Negative for chest pain, palpitations and leg swelling. Gastrointestinal: Negative for abdominal distention, abdominal pain, anal bleeding, blood in stool, constipation, diarrhea, nausea, rectal pain and vomiting. Genitourinary: Negative for difficulty urinating, dysuria, enuresis, flank pain, frequency, genital sores and hematuria. Musculoskeletal: Negative for arthralgias, back pain, gait problem, joint swelling, myalgias, neck pain and neck stiffness. Skin: Negative for color change, pallor, rash and wound. Neurological: Negative for dizziness, tremors, seizures, syncope, facial asymmetry, speech difficulty, weakness, light-headedness, numbness and headaches. Hematological: Negative for adenopathy. Does not bruise/bleed easily. Psychiatric/Behavioral: Negative for agitation, behavioral problems, confusion, decreased concentration, dysphoric mood, hallucinations, self-injury, sleep disturbance and suicidal ideas. The patient is not nervous/anxious and is not hyperactive. Objective:   Physical Exam  Physical Exam  Vitals signs reviewed. Constitutional:       General: She is not in acute distress. Appearance: She is well-developed. Eyes:      Conjunctiva/sclera: Conjunctivae normal.      Pupils: Pupils are equal, round, and reactive to light. Neck:      Thyroid: No thyromegaly. Vascular: No JVD. Trachea: No tracheal deviation. Cardiovascular:      Rate and Rhythm: Normal rate and regular rhythm. Heart sounds: Normal heart sounds.  No murmur. No gallop. Pulmonary:      Effort: Pulmonary effort is normal. No respiratory distress. Breath sounds: Normal breath sounds. No stridor. No wheezing or rales. Chest:      Chest wall: No tenderness. Abdominal:      General: Bowel sounds are normal. There is no distension. Palpations: Abdomen is soft. There is no mass. Tenderness: There is no tenderness. Musculoskeletal:         General: No tenderness. Lymphadenopathy:      Cervical: No cervical adenopathy. Skin:     General: Skin is warm and dry. Coloration: Skin is not pale. Findings: No erythema or rash. Neurological:      Mental Status: She is alert and oriented to person, place, and time. Cranial Nerves: No cranial nerve deficit. Motor: No abnormal muscle tone. Coordination: Coordination normal.      Deep Tendon Reflexes: Reflexes normal.   Psychiatric:         Behavior: Behavior normal.         Thought Content:  Thought content normal.         Judgment: Judgment normal.       Orders Only on 12/20/2019   Component Date Value Ref Range Status    Sodium 12/20/2019 140  136 - 145 mmol/L Final    Potassium 12/20/2019 4.3  3.5 - 5.1 mmol/L Final    Chloride 12/20/2019 99  99 - 110 mmol/L Final    CO2 12/20/2019 22  21 - 32 mmol/L Final    Anion Gap 12/20/2019 19* 3 - 16 Final    Glucose 12/20/2019 146* 70 - 99 mg/dL Final    BUN 12/20/2019 17  7 - 20 mg/dL Final    CREATININE 12/20/2019 <0.5* 0.6 - 1.1 mg/dL Final    GFR Non- 12/20/2019 >60  >60 Final    GFR  12/20/2019 >60  >60 Final    Calcium 12/20/2019 10.4  8.3 - 10.6 mg/dL Final    WBC 12/20/2019 14.1* 4.0 - 11.0 K/uL Final    RBC 12/20/2019 4.66  4.00 - 5.20 M/uL Final    Hemoglobin 12/20/2019 13.3  12.0 - 16.0 g/dL Final    Hematocrit 12/20/2019 41.3  36.0 - 48.0 % Final    MCV 12/20/2019 88.6  80.0 - 100.0 fL Final    MCH 12/20/2019 28.6  26.0 - 34.0 pg Final    MCHC 12/20/2019 32.2  31.0 - 36.0

## 2020-01-16 NOTE — ASSESSMENT & PLAN NOTE
Has restarted her pump and needs guidance about settings sna will ask her to see endo and go over details

## 2020-02-17 ENCOUNTER — OFFICE VISIT (OUTPATIENT)
Dept: ENDOCRINOLOGY | Age: 60
End: 2020-02-17
Payer: COMMERCIAL

## 2020-02-17 VITALS
SYSTOLIC BLOOD PRESSURE: 145 MMHG | HEART RATE: 79 BPM | WEIGHT: 226.2 LBS | HEIGHT: 63 IN | OXYGEN SATURATION: 97 % | BODY MASS INDEX: 40.08 KG/M2 | DIASTOLIC BLOOD PRESSURE: 92 MMHG

## 2020-02-17 LAB — HBA1C MFR BLD: 7.3 %

## 2020-02-17 PROCEDURE — 83036 HEMOGLOBIN GLYCOSYLATED A1C: CPT | Performed by: INTERNAL MEDICINE

## 2020-02-17 PROCEDURE — 99215 OFFICE O/P EST HI 40 MIN: CPT | Performed by: INTERNAL MEDICINE

## 2020-02-17 RX ORDER — BLOOD-GLUCOSE TRANSMITTER
EACH MISCELLANEOUS
Qty: 1 EACH | Refills: 2 | Status: SHIPPED | OUTPATIENT
Start: 2020-02-17 | End: 2021-05-26

## 2020-02-17 RX ORDER — BLOOD-GLUCOSE,RECEIVER,CONT
EACH MISCELLANEOUS
Qty: 1 DEVICE | Refills: 1 | Status: SHIPPED | OUTPATIENT
Start: 2020-02-17

## 2020-02-17 RX ORDER — BLOOD-GLUCOSE SENSOR
EACH MISCELLANEOUS
Qty: 4 EACH | Refills: 2 | Status: SHIPPED | OUTPATIENT
Start: 2020-02-17

## 2020-02-17 NOTE — PROGRESS NOTES
Seen as new patient for diabetes    She has seen Dr. Su Escobar    Diagnosed with Type 2 diabetes mellitus in  2005  Known diabetic complications: none   Uncontrolled    Current diabetic medications     Metformin 1gm BID  Invokana 300mg    Insulin pump  MN 2.7  6 am 2.0  5:30 pm 2.65    I:C  5.7  CF 22  Target 110-120    Last A1c  7.3%<-----   7.6%<--- 7.9 <--- -8.3    Prior visit with dietician: no  Current diet: on average, 3 meals per day   Current exercise: walking   Current monitoring regimen: home blood tests - 2 times daily     Has brought blood glucose log/meter: No   Home blood sugar records: 136-184  Any episodes of hypoglycemia?  No recent    No Hx of CAD , PVD, CVA    Hyperlipidemia:   For   Years  Takes lipitor 10mg  Controlled  LDL 76 on 3/19     Hypertension for years  Stable  Takes  Losartan 25mg    Last eye exam: 11/19  Last foot exam: 2/20  Last microalbumin to creatinine ratio:   9/19    She has a h/o hypothyroidism    H/o QUIJANO for hyperthyroidism    S/p Thyroidectomy for thyroid Nodule: had atypical cells on FNA and patient had surgery with Dr Moo Samaniego in 2/2013, normal path, Hurtle cell adenoma;     TSH 2.68 on 3/19    Synthroid 125mcg cytomel 5mcg daily    Past Medical History:   Diagnosis Date    Cystic acne     Diverticulitis large intestine 7/7/2010    Diverticulosis of colon     Episcleritis     HTN (hypertension) 12/6/2012    Hyperemesis gravidarum     Hyperlipidemia     Hyperthyroidism     Malignant neoplasm of anterior wall of urinary bladder ([facility] [address] 1/13/2017    Pregnancies     2/   2-vaginal deliveries    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    Hurtle Cell adenoma    Type II or unspecified type diabetes mellitus without mention of complication, not stated as uncontrolled     Vitamin D deficiency 11/2012     Past Surgical History:   Procedure Laterality Date    BLADDER SURGERY  12/01/2016    cancerous tumor removed from bladder    COLONOSCOPY  2012    polyps    OTHER SURGICAL HISTORY  2003    neuro surgery- CTS repair    THYROIDECTOMY  5/9271    UMBILICAL HERNIA REPAIR  5/5/16    with mesh      Current Outpatient Medications   Medication Sig Dispense Refill    aspirin 81 MG tablet Take 81 mg by mouth daily      metFORMIN (GLUCOPHAGE) 1000 MG tablet TAKE 1 TABLET TWICE A DAY WITH FOOD 180 tablet 2    losartan (COZAAR) 25 MG tablet Take 1 tablet by mouth daily 90 tablet 3    liothyronine (CYTOMEL) 5 MCG tablet TAKE 1 TABLET DAILY 90 tablet 3    insulin lispro (HUMALOG) 100 UNIT/ML injection vial INJECT UP  UNITS     UNDER THE SKIN DAILY VIA   INSULIN PUMP 90 mL 3    canagliflozin (INVOKANA) 300 MG TABS tablet Take 1 tablet by mouth every morning (before breakfast) 90 tablet 1    levothyroxine (SYNTHROID) 125 MCG tablet Take 1 tablet by mouth Daily 90 tablet 3    vitamin D (ERGOCALCIFEROL) 55904 units CAPS capsule Take 2 capsules by mouth  once weekly 20 capsule 2    glucose blood VI test strips (ANAMARIA CONTOUR NEXT TEST) strip 1 each by In Vitro route 5 times daily As needed. 150 each 4    atorvastatin (LIPITOR) 10 MG tablet TAKE 1 TABLET BY MOUTH DAILY 90 tablet 3    JARDIANCE 25 MG tablet TAKE ONE TABLET BY MOUTH DAILY (Patient not taking: Reported on 2/17/2020) 90 tablet 2     No current facility-administered medications for this visit.         Review of Systems  Please see scanned document dated and signed      Objective:      BP (!) 151/86 (Site: Left Upper Arm, Position: Sitting, Cuff Size: Large Adult)   Pulse 79   Ht 5' 3\" (1.6 m)   Wt 226 lb 3.2 oz (102.6 kg)   LMP  (LMP Unknown)   SpO2 97%   Breastfeeding No   BMI 40.07 kg/m²   Wt Readings from Last 3 Encounters:   02/17/20 226 lb 3.2 oz (102.6 kg)   01/16/20 226 lb 12.8 oz (102.9 kg)   12/20/19 227 lb 6.4 oz (103.1 kg)     Constitutional: Well-developed, alert, appears stated age, cooperative, in no acute distress  H/E/N/M/T:atraumatic, normocephalic, external ears, nose, lips normal without lesions  Eyes: Arcus Senilis is not present, extraocular muscles are intact  Neck: supple, trachea midline, acanthosis nigricance is not present. Thyroid: surgically absent, scar mandi  Respiratory: breathing is unlabored, lungs are clear to auscultations. Cardiovascular: regular rate and rhythm, S1, S2, regular rate and rhythm, no murmur, rub or gallop. Skeletal muscular: no kyphosis, no gross abnormalities  Skin: Xanthoma/Xanthelasmas are  not present  Psychiatric: Judgement and Insight:  judgement and insight appear normal  Neuro: Normal without focal findings, speech is spontaneous, and movements are coordinated, alert and oriented x3   Skeletal foot exam is normal, no skin lesions, toenails are normal, 10 g monofilament is 10/10    Lab Reviewed   No components found for: CHLPL  Lab Results   Component Value Date    TRIG 142 03/22/2019    TRIG 184 (H) 06/08/2018    TRIG 133 02/01/2018     Lab Results   Component Value Date    HDL 37 (L) 03/22/2019    HDL 38 (L) 06/08/2018    HDL 32 (L) 02/01/2018     Lab Results   Component Value Date    LDLCALC 76 03/22/2019    LDLCALC 108 (H) 06/08/2018    LDLCALC 81 02/01/2018     Lab Results   Component Value Date    LABVLDL 28 03/22/2019    LABVLDL 37 06/08/2018    LABVLDL 27 02/01/2018     Lab Results   Component Value Date    LABA1C 7.6 09/10/2019       Assessment:     Tad Borges is a 63 y.o. female with :    1.T2DM: Longstanding, fair control, on insulin pump and oral medication. Reviewed log, has fasting high, adjust basal rate. Needs CHO restriction to 45 gram per meal.Check Dexcom coverage  2. HTN : Blood pressure at goal  3. HLD: LDL at goal  4. Hypothyroidism: TSH at goal     Plan:      Change basal as:     MN 2.7----> 2.8  6 am 2.0  5:30 pm 2.65     Advised to check blood sugar 4 times a day   Patient to send blood sugar log for titration. Advise to exercise regularly. Advise to low simple carbohydrate and protein with each  meal diet.     Diabetes Care: recommend yearly eye exam, foot exam and urine microalbumin to   creatinine ratio.

## 2020-02-18 ENCOUNTER — OFFICE VISIT (OUTPATIENT)
Dept: ENDOCRINOLOGY | Age: 60
End: 2020-02-18
Payer: COMMERCIAL

## 2020-02-18 PROCEDURE — 97802 MEDICAL NUTRITION INDIV IN: CPT | Performed by: DIETITIAN, REGISTERED

## 2020-02-18 NOTE — PROGRESS NOTES
Hypothyroid    Diverticulosis of colon    Hyperlipidemia    Essential hypertension    Vitamin D deficiency    Hot flushes, perimenopausal    Neuropathy    Non morbid obesity    Uncontrolled type 2 diabetes mellitus with hyperglycemia (HCC)    Umbilical hernia, incarcerated    Trigger finger of right thumb    Rosacea    Malignant neoplasm of anterior wall of urinary bladder (HCC)    Insulin pump titration    Other chest pain    Arthralgia       Current Outpatient Medications   Medication Sig Dispense Refill    aspirin 81 MG tablet Take 81 mg by mouth daily      Continuous Blood Gluc  (DEXCOM G6 ) DIPTI As needed 1 Device 1    Continuous Blood Gluc Sensor (DEXCOM G6 SENSOR) MISC As needed 4 each 2    Continuous Blood Gluc Transmit (DEXCOM G6 TRANSMITTER) MISC As needed 1 each 2    metFORMIN (GLUCOPHAGE) 1000 MG tablet TAKE 1 TABLET TWICE A DAY WITH FOOD 180 tablet 2    losartan (COZAAR) 25 MG tablet Take 1 tablet by mouth daily 90 tablet 3    liothyronine (CYTOMEL) 5 MCG tablet TAKE 1 TABLET DAILY 90 tablet 3    JARDIANCE 25 MG tablet TAKE ONE TABLET BY MOUTH DAILY (Patient not taking: Reported on 2/17/2020) 90 tablet 2    insulin lispro (HUMALOG) 100 UNIT/ML injection vial INJECT UP  UNITS     UNDER THE SKIN DAILY VIA   INSULIN PUMP 90 mL 3    canagliflozin (INVOKANA) 300 MG TABS tablet Take 1 tablet by mouth every morning (before breakfast) 90 tablet 1    levothyroxine (SYNTHROID) 125 MCG tablet Take 1 tablet by mouth Daily 90 tablet 3    vitamin D (ERGOCALCIFEROL) 34853 units CAPS capsule Take 2 capsules by mouth  once weekly 20 capsule 2    glucose blood VI test strips (ANAMARIA CONTOUR NEXT TEST) strip 1 each by In Vitro route 5 times daily As needed. 150 each 4    atorvastatin (LIPITOR) 10 MG tablet TAKE 1 TABLET BY MOUTH DAILY 90 tablet 3     No current facility-administered medications for this visit.           NUTRITION ASSESSMENT    Biochemical Data:    Lab Results   Component Value Date    LABA1C 7.3 02/17/2020     Lab Results   Component Value Date    .4 09/10/2019       Lab Results   Component Value Date    CHOL 141 03/22/2019    CHOL 183 06/08/2018    CHOL 140 02/01/2018     Lab Results   Component Value Date    TRIG 142 03/22/2019    TRIG 184 (H) 06/08/2018    TRIG 133 02/01/2018     Lab Results   Component Value Date    HDL 37 (L) 03/22/2019    HDL 38 (L) 06/08/2018    HDL 32 (L) 02/01/2018     Lab Results   Component Value Date    LDLCALC 76 03/22/2019    LDLCALC 108 (H) 06/08/2018    LDLCALC 81 02/01/2018    LDLCHOLESTEROL 82 08/22/2013     Lab Results   Component Value Date    LABVLDL 28 03/22/2019    LABVLDL 37 06/08/2018    LABVLDL 27 02/01/2018     No results found for: [facility]    Lab Results   Component Value Date    WBC 14.1 (H) 12/20/2019    HGB 13.3 12/20/2019    HCT 41.3 12/20/2019    MCV 88.6 12/20/2019     12/20/2019       Lab Results   Component Value Date    CREATININE <0.5 (L) 12/20/2019    BUN 17 12/20/2019     12/20/2019    K 4.3 12/20/2019    CL 99 12/20/2019    CO2 22 12/20/2019       Diabetes Medications: Yes  Knows name and dose of prescribed medications Yes  Knows prescribed schedule for medicationsYes  Recent change in medication type/dosage: No  Stores  medications properlyYes  Comments: Pt. Uses Medtronic insulin pump. Monitoring:   Has BG meter: Yes  Testing frequency: 2  Recent results: before meals  Hypoglycemia? No    Anthropometric Measurements:   Wt:   Wt Readings from Last 3 Encounters:   02/17/20 226 lb 3.2 oz (102.6 kg)   01/16/20 226 lb 12.8 oz (102.9 kg)   12/20/19 227 lb 6.4 oz (103.1 kg)      BMI:   BMI Readings from Last 3 Encounters:   02/17/20 40.07 kg/m²   01/16/20 40.18 kg/m²   12/20/19 40.28 kg/m²     Patient's stated goal weight:   7% Weight loss goal weight:     Physical Activity History:   Physical activity: walks dog  Frequency of activity: daily  Duration of activity: 10 minutes  Obstacles to activity: none or work hours    Sleep: poor, hoping new bed will make it better, snores, short sleep duration    Food and Nutrition History:   Nutrition Awareness/Previous DSMES: yes  Beverage consumption: 8 cups water  Alcohol consumption: yes, irregular 1-2 drinks  Frequency of Meals Eaten away from home:4   Food Availability Problems  Within the past 12 months, have you worried that your food would run out before you got money to buy more? No  Within the past 12 months, has the food you bought not lasted till the end of the month and you didn't have money to get more? No  Number of people in household: 2  Usual Food consumption:   3 meals, 0-45 grams carb meals     Barriers:   -none          Follow Up Plan: 2 months Contact information provided.     Referring Provider: Teresa Baron MD  Time spent with patient: 60 minutes

## 2020-04-12 RX ORDER — EMPAGLIFLOZIN 25 MG/1
TABLET, FILM COATED ORAL
Qty: 90 TABLET | Refills: 2 | Status: SHIPPED | OUTPATIENT
Start: 2020-04-12 | End: 2021-01-09

## 2020-06-01 RX ORDER — LEVOTHYROXINE SODIUM 0.12 MG/1
125 TABLET ORAL DAILY
Qty: 90 TABLET | Refills: 3 | Status: SHIPPED | OUTPATIENT
Start: 2020-06-01 | End: 2021-05-18

## 2020-06-17 ENCOUNTER — OFFICE VISIT (OUTPATIENT)
Dept: ENDOCRINOLOGY | Age: 60
End: 2020-06-17
Payer: COMMERCIAL

## 2020-06-17 VITALS
SYSTOLIC BLOOD PRESSURE: 151 MMHG | BODY MASS INDEX: 41.14 KG/M2 | WEIGHT: 232.2 LBS | HEIGHT: 63 IN | RESPIRATION RATE: 18 BRPM | HEART RATE: 87 BPM | OXYGEN SATURATION: 96 % | DIASTOLIC BLOOD PRESSURE: 82 MMHG

## 2020-06-17 LAB — HBA1C MFR BLD: 8.2 %

## 2020-06-17 PROCEDURE — 99214 OFFICE O/P EST MOD 30 MIN: CPT | Performed by: INTERNAL MEDICINE

## 2020-06-17 PROCEDURE — 3051F HG A1C>EQUAL 7.0%<8.0%: CPT | Performed by: INTERNAL MEDICINE

## 2020-06-17 PROCEDURE — 83036 HEMOGLOBIN GLYCOSYLATED A1C: CPT | Performed by: INTERNAL MEDICINE

## 2020-06-17 RX ORDER — LOSARTAN POTASSIUM 50 MG/1
50 TABLET ORAL DAILY
Qty: 30 TABLET | Refills: 2 | Status: SHIPPED | OUTPATIENT
Start: 2020-06-17 | End: 2020-09-15 | Stop reason: SDUPTHER

## 2020-06-17 RX ORDER — DULAGLUTIDE 0.75 MG/.5ML
0.75 INJECTION, SOLUTION SUBCUTANEOUS WEEKLY
Qty: 4 PEN | Refills: 2 | Status: SHIPPED | OUTPATIENT
Start: 2020-06-17 | End: 2020-09-14

## 2020-06-17 NOTE — PROGRESS NOTES
conjunctivae and sclerae normal, No proptosis, no redness  Neck: supple, symmetrical, no swelling  Skin: No obvious rashes or lesions present. Skin and hair texture normal  Psychiatric: Judgement and Insight:  judgement and insight appear normal  Neuro: Normal without focal findings, speech is normal normal, speech is spontaneous  Chest: No labored breathing, no chest deformity, no stridor  Musculoskeletal: No joint deformity, swelling      Lab Reviewed   No components found for: CHLPL  Lab Results   Component Value Date    TRIG 142 03/22/2019    TRIG 184 (H) 06/08/2018    TRIG 133 02/01/2018     Lab Results   Component Value Date    HDL 37 (L) 03/22/2019    HDL 38 (L) 06/08/2018    HDL 32 (L) 02/01/2018     Lab Results   Component Value Date    LDLCALC 76 03/22/2019    LDLCALC 108 (H) 06/08/2018    LDLCALC 81 02/01/2018     Lab Results   Component Value Date    LABVLDL 28 03/22/2019    LABVLDL 37 06/08/2018    LABVLDL 27 02/01/2018     Lab Results   Component Value Date    LABA1C 7.3 02/17/2020       Assessment:     Radha Singleton is a 63 y.o. female with :    1.T2DM: Longstanding, fair control, on insulin pump and oral medication. Reviewed log, has fasting high, adjust basal rate. Needs CHO restriction to 45 gram per meal.Check Dexcom coverage, not covered  Start Trulicity  2. HTN : Blood pressure elevated, increase losartan  3. HLD: LDL at goal  4. Hypothyroidism: TSH at goal  5. Insulin pump in place: NO failures    Plan:      Change basal as:     MN  2.8----> 3.0  6 am 2.0  5:30 pm 2.65    I:C  5.7---> 5.5     Advised to check blood sugar 4 times a day   Patient to send blood sugar log for titration. Advise to exercise regularly. Advise to low simple carbohydrate and protein with each  meal diet. Diabetes Care: recommend yearly eye exam, foot exam and urine microalbumin to   creatinine ratio.

## 2020-07-15 ENCOUNTER — TELEPHONE (OUTPATIENT)
Dept: FAMILY MEDICINE CLINIC | Age: 60
End: 2020-07-15

## 2020-07-15 RX ORDER — ATORVASTATIN CALCIUM 10 MG/1
TABLET, FILM COATED ORAL
Qty: 90 TABLET | Refills: 3 | Status: SHIPPED | OUTPATIENT
Start: 2020-07-15 | End: 2020-10-12

## 2020-07-15 NOTE — TELEPHONE ENCOUNTER
Pt [facility] [phone].  Last ov: 1/16/2020

## 2020-09-13 RX ORDER — LIOTHYRONINE SODIUM 5 UG/1
TABLET ORAL
Qty: 90 TABLET | Refills: 3 | Status: SHIPPED | OUTPATIENT
Start: 2020-09-13 | End: 2021-08-26

## 2020-09-14 RX ORDER — DULAGLUTIDE 0.75 MG/.5ML
INJECTION, SOLUTION SUBCUTANEOUS
Qty: 4 PEN | Refills: 1 | Status: SHIPPED | OUTPATIENT
Start: 2020-09-14 | End: 2020-09-15 | Stop reason: SDUPTHER

## 2020-09-15 ENCOUNTER — VIRTUAL VISIT (OUTPATIENT)
Dept: ENDOCRINOLOGY | Age: 60
End: 2020-09-15
Payer: COMMERCIAL

## 2020-09-15 PROCEDURE — 99214 OFFICE O/P EST MOD 30 MIN: CPT | Performed by: INTERNAL MEDICINE

## 2020-09-15 PROCEDURE — 3052F HG A1C>EQUAL 8.0%<EQUAL 9.0%: CPT | Performed by: INTERNAL MEDICINE

## 2020-09-15 RX ORDER — DULAGLUTIDE 0.75 MG/.5ML
INJECTION, SOLUTION SUBCUTANEOUS
Qty: 4 PEN | Refills: 3 | Status: SHIPPED | OUTPATIENT
Start: 2020-09-15 | End: 2020-12-16

## 2020-09-15 RX ORDER — LOSARTAN POTASSIUM 50 MG/1
50 TABLET ORAL DAILY
Qty: 30 TABLET | Refills: 2 | Status: SHIPPED | OUTPATIENT
Start: 2020-09-15 | End: 2021-01-15

## 2020-09-15 NOTE — PROGRESS NOTES
Seen as  patient for diabetes        Pursuant to the emergency declaration under the [facility] waiver authority and the [employer] Act, this Virtual  Visit was conducted, with patient's consent, to reduce the patient's risk of exposure to COVID-19 and provide continuity of care for an established patient. Patient was at home. Provider was at home. No one else was involved  Services were provided through a video synchronous discussion virtually to substitute for in-person clinic visit. Interim:    Feels better  Glucose stable    Diagnosed with Type 2 diabetes mellitus in  2005  Known diabetic complications: none   Uncontrolled, moderate    Current diabetic medications     Metformin 1gm BID  jardiance  Trulicity    Insulin pump   MN   3.0  6 am 2.0  5:30 pm 2.65    I:C  5.5  CF 22  Target 110-120    Last A1c  8.2%<----7.3%<-----   7.6%<--- 7.9 <--- -8.3    Prior visit with dietician: no  Current diet: on average, 3 meals per day   Current exercise: walking   Current monitoring regimen: home blood tests - 2 times daily     Has brought blood glucose log/meter: No   Home blood sugar records: 100s  Any episodes of hypoglycemia?  No recent    No Hx of CAD , PVD, CVA    Hyperlipidemia:   For   Years  Takes lipitor 10mg  Controlled  LDL 76 on 3/19     Hypertension for years  Stable  Takes  Losartan 50mg    Last eye exam: 11/19  Last foot exam: 2/20  Last microalbumin to creatinine ratio:   9/19    She has a h/o hypothyroidism    H/o QUIJANO for hyperthyroidism    S/p Thyroidectomy for thyroid Nodule: had atypical cells on FNA and patient had surgery with Dr Rylee Voss in 2/2013, normal path, Hurtle cell adenoma;     TSH 2.68 on 3/19    Synthroid 125mcg cytomel 5mcg daily    She is taking Vit D 50,000 IU , weekly, needs level checked    Past Medical History:   Diagnosis Date    Cystic acne     Diverticulitis large intestine 7/7/2010  Diverticulosis of colon     Episcleritis     HTN (hypertension) 12/6/2012    Hyperemesis gravidarum     Hyperlipidemia     Hyperthyroidism     Malignant neoplasm of anterior wall of urinary bladder ([facility] [address] 1/13/2017    Pregnancies     2/   2-vaginal deliveries    Thyroid follicular adenoma 32/2840    Hurtle Cell adenoma    Type II or unspecified type diabetes mellitus without mention of complication, not stated as uncontrolled     Vitamin D deficiency 11/2012     Past Surgical History:   Procedure Laterality Date    BLADDER SURGERY  12/01/2016    cancerous tumor removed from bladder    COLONOSCOPY  2012    polyps    OTHER SURGICAL HISTORY  2003    neuro surgery- CTS repair    THYROIDECTOMY  3/4755    UMBILICAL HERNIA REPAIR  5/5/16    with mesh      Current Outpatient Medications   Medication Sig Dispense Refill    TRULICITY 7.43 SH/3.7QB SOPN INJECT 0.75 MG UNDER THE SKIN ONCE WEEKLY 4 pen 1    liothyronine (CYTOMEL) 5 MCG tablet TAKE 1 TABLET DAILY 90 tablet 3    insulin lispro (HUMALOG) 100 UNIT/ML injection vial INJECT UP  UNITS     UNDER THE SKIN DAILY VIA   INSULIN PUMP 90 mL 3    atorvastatin (LIPITOR) 10 MG tablet TAKE 1 TABLET BY MOUTH DAILY (Patient taking differently: Take 20 mg by mouth daily TAKE 1 TABLET BY MOUTH DAILY) 90 tablet 3    losartan (COZAAR) 50 MG tablet Take 1 tablet by mouth daily 30 tablet 2    levothyroxine (SYNTHROID) 125 MCG tablet Take 1 tablet by mouth Daily 90 tablet 3    JARDIANCE 25 MG tablet TAKE ONE TABLET BY MOUTH DAILY 90 tablet 2    aspirin 81 MG tablet Take 81 mg by mouth daily      metFORMIN (GLUCOPHAGE) 1000 MG tablet TAKE 1 TABLET TWICE A DAY WITH FOOD 180 tablet 2    vitamin D (ERGOCALCIFEROL) 49423 units CAPS capsule Take 2 capsules by mouth  once weekly 20 capsule 2    glucose blood VI test strips (ANAMARIA CONTOUR NEXT TEST) strip 1 each by In Vitro route 5 times daily As needed.  150 each 4    Continuous Blood Gluc  (DEXCOM G6 Thien Wang) DIPTI As needed (Patient not taking: Reported on 9/15/2020) 1 Device 1    Continuous Blood Gluc Sensor (DEXCOM G6 SENSOR) MISC As needed (Patient not taking: Reported on 9/15/2020) 4 each 2    Continuous Blood Gluc Transmit (DEXCOM G6 TRANSMITTER) MISC As needed (Patient not taking: Reported on 9/15/2020) 1 each 2     No current facility-administered medications for this visit. Review of Systems  Constitutional: Negative for weight loss and malaise/fatigue. Negative for fever and chills. HENT: Negative for hearing loss, ear pain, nosebleeds, neck pain and tinnitus. Eyes: Negative for blurred vision. Negative for double vision, photophobia and pain. Respiratory: Negative for cough and sputum production. +SOB  Cardiovascular: Negative for chest pain, palpitations and leg swelling. Gastrointestinal: Negative for nausea, vomiting and abdominal pain. Genitourinary: Negative for dysuria, urgency and frequency. Musculoskeletal: Negative for back pain. No joint pain  Skin: Negative for itching and rash. Neurological: + for dizziness. Negative for tingling, tremors, focal weakness and headaches. Endo/Heme/Allergies: see HPI  Psychiatric/Behavioral: Negative for depression and substance abuse. +weight loss      PHYSICAL EXAMINATION:  [ INSTRUCTIONS:  \"[x]\" Indicates a positive item  \"[]\" Indicates a negative item  -- DELETE ALL ITEMS NOT EXAMINED]  Vital Signs: (As obtained by patient/caregiver or practitioner observation)    Blood pressure-  Heart rate-    Respiratory rate- 14   Temperature-  Pulse oximetry-     Constitutional Appears well-developed and well-nourished No apparent distress        Mental status  Alert and awake  Oriented to person/place/time  Able to follow commands      Eyes:  EOM    [x]  Normal    Sclera  [x]  Normal           Discharge [x]  None visible      HENT:   [x] Normocephalic, atraumatic.     [x] Mouth/Throat: Mucous membranes are moist.     External Ears [x] improved  3. HLD: LDL at goal  4. Hypothyroidism: TSH at goal, needs repeat  5. Insulin pump in place: No failures  6. Vitamin D insufficiency:  Needs level checked, on weekly dose now, She is taking  IU    Plan:      Change basal as:     MN  3.0  6 am 2.0  5:30 pm 2.65    I:C   5.5     Advise to check blood sugar 4 times a day   Patient to send blood sugar log for titration. Advise to exercise regularly. Advise to low simple carbohydrate and protein with each  meal diet. Diabetes Care: recommend yearly eye exam, foot exam and urine microalbumin to   creatinine ratio.

## 2020-09-16 ENCOUNTER — TELEPHONE (OUTPATIENT)
Dept: FAMILY MEDICINE CLINIC | Age: 60
End: 2020-09-16

## 2020-09-16 NOTE — TELEPHONE ENCOUNTER
----- Message from Abad Shepherd sent at 9/16/2020  2:52 PM EDT -----  Subject: Message to Provider    QUESTIONS  Information for Provider? Pt made appt for December 4th (1st avail) and   wanted to know if she could be seen sooner or maybe put on a cancellation   list  ---------------------------------------------------------------------------  --------------  CALL BACK INFO  What is the best way for the office to contact you? OK to leave message on   voicemail   OK to respond with secure message via [employer] portal (only for patients   who have registered [employer] account)  Preferred Call Back Phone Number? [phone]  ---------------------------------------------------------------------------  --------------  SCRIPT ANSWERS  Relationship to Patient?  Self

## 2020-09-18 ENCOUNTER — HOSPITAL ENCOUNTER (OUTPATIENT)
Age: 60
Discharge: HOME | End: 2020-09-18
Payer: COMMERCIAL

## 2020-09-18 LAB
CREATININE URINE: 83.7 MG/DL (ref 28–259)
ESTIMATED AVERAGE GLUCOSE: 148.5 MG/DL
HBA1C MFR BLD: 6.8 %
MICROALBUMIN UR-MCNC: <1.2 MG/DL
MICROALBUMIN/CREAT UR-RTO: NORMAL MG/G (ref 0–30)
TSH REFLEX: 1.84 UIU/ML (ref 0.27–4.2)
VITAMIN D 25-HYDROXY: 56.2 NG/ML

## 2020-09-18 PROCEDURE — 82570 ASSAY OF URINE CREATININE: CPT

## 2020-09-18 PROCEDURE — 83036 HEMOGLOBIN GLYCOSYLATED A1C: CPT

## 2020-09-18 PROCEDURE — 84443 ASSAY THYROID STIM HORMONE: CPT

## 2020-09-18 PROCEDURE — 82306 VITAMIN D 25 HYDROXY: CPT

## 2020-09-18 PROCEDURE — 82043 UR ALBUMIN QUANTITATIVE: CPT

## 2020-10-12 ENCOUNTER — OFFICE VISIT (OUTPATIENT)
Dept: FAMILY MEDICINE CLINIC | Age: 60
End: 2020-10-12
Payer: COMMERCIAL

## 2020-10-12 VITALS
HEIGHT: 63 IN | BODY MASS INDEX: 40.54 KG/M2 | WEIGHT: 228.8 LBS | OXYGEN SATURATION: 95 % | SYSTOLIC BLOOD PRESSURE: 130 MMHG | DIASTOLIC BLOOD PRESSURE: 78 MMHG | TEMPERATURE: 98.1 F | HEART RATE: 104 BPM

## 2020-10-12 PROBLEM — M25.50 ARTHRALGIA: Status: RESOLVED | Noted: 2019-09-10 | Resolved: 2020-10-12

## 2020-10-12 PROBLEM — R06.09 DOE (DYSPNEA ON EXERTION): Status: ACTIVE | Noted: 2020-10-12

## 2020-10-12 PROBLEM — Z00.00 WELL ADULT EXAM: Status: ACTIVE | Noted: 2017-04-05

## 2020-10-12 PROCEDURE — 93000 ELECTROCARDIOGRAM COMPLETE: CPT | Performed by: FAMILY MEDICINE

## 2020-10-12 PROCEDURE — 99396 PREV VISIT EST AGE 40-64: CPT | Performed by: FAMILY MEDICINE

## 2020-10-12 RX ORDER — ERGOCALCIFEROL 1.25 MG/1
CAPSULE ORAL
Qty: 12 CAPSULE | Refills: 3 | Status: SHIPPED | OUTPATIENT
Start: 2020-10-12 | End: 2021-08-30

## 2020-10-12 RX ORDER — ZOSTER VACCINE RECOMBINANT, ADJUVANTED 50 MCG/0.5
0.5 KIT INTRAMUSCULAR SEE ADMIN INSTRUCTIONS
Qty: 0.5 ML | Refills: 0 | Status: SHIPPED | OUTPATIENT
Start: 2020-10-12 | End: 2020-10-13

## 2020-10-12 RX ORDER — ATORVASTATIN CALCIUM 20 MG/1
20 TABLET, FILM COATED ORAL DAILY
Qty: 90 TABLET | Refills: 3 | Status: SHIPPED | OUTPATIENT
Start: 2020-10-12 | End: 2021-10-12

## 2020-10-12 SDOH — ECONOMIC STABILITY: FOOD INSECURITY: WITHIN THE PAST 12 MONTHS, THE FOOD YOU BOUGHT JUST DIDN'T LAST AND YOU DIDN'T HAVE MONEY TO GET MORE.: NEVER TRUE

## 2020-10-12 SDOH — ECONOMIC STABILITY: TRANSPORTATION INSECURITY
IN THE PAST 12 MONTHS, HAS THE LACK OF TRANSPORTATION KEPT YOU FROM MEDICAL APPOINTMENTS OR FROM GETTING MEDICATIONS?: NO

## 2020-10-12 SDOH — ECONOMIC STABILITY: FOOD INSECURITY: WITHIN THE PAST 12 MONTHS, YOU WORRIED THAT YOUR FOOD WOULD RUN OUT BEFORE YOU GOT MONEY TO BUY MORE.: NEVER TRUE

## 2020-10-12 SDOH — ECONOMIC STABILITY: INCOME INSECURITY: HOW HARD IS IT FOR YOU TO PAY FOR THE VERY BASICS LIKE FOOD, HOUSING, MEDICAL CARE, AND HEATING?: NOT HARD AT ALL

## 2020-10-12 SDOH — ECONOMIC STABILITY: TRANSPORTATION INSECURITY
IN THE PAST 12 MONTHS, HAS LACK OF TRANSPORTATION KEPT YOU FROM MEETINGS, WORK, OR FROM GETTING THINGS NEEDED FOR DAILY LIVING?: NO

## 2020-10-12 NOTE — PROGRESS NOTES
Patient Self-Management Goal for Chronic Condition  Goal: I will check my blood sugar at least once per day, in the morning before breakfast, and bring these readings to my next visit.   Barriers to success: none  Plan for overcoming my barriers: N/A   Saw endo and had A1c of 6 a few months ago  Confidence: 8/10  Date goal set: 10/12/20  Date goal attained:

## 2020-10-12 NOTE — PROGRESS NOTES
History and Physical      Luana Russo  YOB: 1960    Date of Service:  10/12/2020    Chief Complaint:   Luana Russo is a 63 y.o. female who presents for complete physical examination.     HPI: CPX  Cc--loud snoring--AM HA(BS lower since trulicity started)    Wt Readings from Last 3 Encounters:   10/12/20 228 lb 12.8 oz (103.8 kg)   06/17/20 232 lb 3.2 oz (105.3 kg)   02/17/20 226 lb 3.2 oz (102.6 kg)     BP Readings from Last 3 Encounters:   10/12/20 130/78   06/17/20 (!) 151/82   02/17/20 (!) 145/92       Patient Active Problem List   Diagnosis    Hypothyroid    Diverticulosis of colon    Hyperlipidemia    Essential hypertension    Vitamin D deficiency    Hot flushes, perimenopausal    Neuropathy    Non morbid obesity    Uncontrolled type 2 diabetes mellitus with hyperglycemia ([address]    Umbilical hernia, incarcerated    Trigger finger of right thumb    Rosacea    Malignant neoplasm of anterior wall of urinary bladder (HCC)    Insulin pump titration    Other chest pain    Arthralgia       Preventive Care:  Health Maintenance   Topic Date Due    Hepatitis B vaccine (1 of 3 - Risk 3-dose series) 11/30/1980    Shingles Vaccine (1 of 2) 11/30/2011    Cervical cancer screen  08/15/2013    Lipid screen  03/22/2020    Diabetic retinal exam  11/14/2020    Potassium monitoring  12/20/2020    Creatinine monitoring  12/20/2020    Diabetic foot exam  02/17/2021    Breast cancer screen  08/06/2021    A1C test (Diabetic or Prediabetic)  09/18/2021    Diabetic microalbuminuria test  09/18/2021    TSH testing  09/18/2021    DTaP/Tdap/Td vaccine (2 - Td) 09/21/2026    Colon cancer screen colonoscopy  04/20/2028    Flu vaccine  Completed    Pneumococcal 0-64 years Vaccine  Completed    Hepatitis C screen  Completed    Hepatitis A vaccine  Aged Out    Hib vaccine  Aged Out    Meningococcal (ACWY) vaccine  Aged Out    HIV screen  Discontinued      Hx abnormal PAP: no  Sexual activity: single partner, contraception - none   Self-breast exams: yes  Previous DEXA scan: no  Last eye exam: 2019, normal  Exercise: no regular exercise  Seatbelt use: +  Lipid panel:   Lab Results   Component Value Date    CHOL 141 03/22/2019    TRIG 142 03/22/2019    HDL 37 (L) 03/22/2019    LDLCALC 76 03/22/2019        Advance Directive: N, <no information>    Immunization History   Administered Date(s) Administered    Influenza Virus Vaccine 11/01/2014, 10/25/2019, 09/18/2020    Pneumococcal Polysaccharide ([id number]) 10/06/2016    Tdap (Boostrix, Adacel) 09/21/2016    Tetanus 05/05/2004       Allergies   Allergen Reactions    Penicillins      Outpatient Medications Marked as Taking for the 10/12/20 encounter (Office Visit) with Valentín Zaman MD   Medication Sig Dispense Refill    zoster recombinant adjuvanted vaccine [facility]) 50 MCG/0.5ML SUSR injection Inject 0.5 mLs into the muscle See Admin Instructions for 1 day 0.5 mL 0    vitamin D (ERGOCALCIFEROL) 1.25 MG (37309 UT) CAPS capsule Take 1 capsule by mouth  once weekly 12 capsule 3    atorvastatin (LIPITOR) 20 MG tablet Take 1 tablet by mouth daily TAKE 1 TABLET BY MOUTH DAILY 90 tablet 3    metFORMIN (GLUCOPHAGE) 1000 MG tablet TAKE 1 TABLET 2 TIMES DAILYWITH FOOD 180 tablet 2    Dulaglutide (TRULICITY) 2.02 YS/0.8DW SOPN INJECT 0.75 MG UNDER THE SKIN ONCE WEEKLY 4 pen 3    losartan (COZAAR) 50 MG tablet Take 1 tablet by mouth daily 30 tablet 2    liothyronine (CYTOMEL) 5 MCG tablet TAKE 1 TABLET DAILY 90 tablet 3    insulin lispro (HUMALOG) 100 UNIT/ML injection vial INJECT UP  UNITS     UNDER THE SKIN DAILY VIA   INSULIN PUMP 90 mL 3    levothyroxine (SYNTHROID) 125 MCG tablet Take 1 tablet by mouth Daily 90 tablet 3    JARDIANCE 25 MG tablet TAKE ONE TABLET BY MOUTH DAILY 90 tablet 2    aspirin 81 MG tablet Take 81 mg by mouth daily      Continuous Blood Gluc  (DEXCOM G6 ) DIPTI As needed 1 Device 1    standard drinks     Types: 1 Glasses of wine per week    Drug use: No    Sexual activity: Yes     Partners: Male   Lifestyle    Physical activity     Days per week: Not on file     Minutes per session: Not on file    Stress: Not on file   Relationships    Social connections     Talks on phone: Not on file     Gets together: Not on file     Attends Yazdanism service: Not on file     Active member of club or organization: Not on file     Attends meetings of clubs or organizations: Not on file     Relationship status: Not on file    Intimate partner violence     Fear of current or ex partner: Not on file     Emotionally abused: Not on file     Physically abused: Not on file     Forced sexual activity: Not on file   Other Topics Concern    Not on file   Social History Narrative    Michael Beal    + seatbelts    Happily     Hobbies--none       Review of Systems:  A comprehensive review of systems was negative except for what was noted in the HPI. Physical Exam:   Vitals:    10/12/20 1413   BP: 130/78   Pulse: 104   Temp: 98.1 °F (36.7 °C)   SpO2: 95%   Weight: 228 lb 12.8 oz (103.8 kg)   Height: 5' 3\" (1.6 m)     Body mass index is 40.53 kg/m². Constitutional: She is oriented to person, place, and time. She appears well-developed and well-nourished. No distress. HEENT:   Head: Normocephalic and atraumatic. Right Ear: Tympanic membrane, external ear and ear canal normal.   Left Ear: Tympanic membrane, external ear and ear canal normal.   Nose: Nose normal.   Mouth/Throat: Oropharynx is clear and moist, and mucous membranes are normal.  There is no cervical adenopathy. Eyes: Conjunctivae and extraocular motions are normal. Pupils are equal, round, and reactive to light. Neck: Neck supple. No JVD present. Carotid bruit is not present. No mass and no thyromegaly present. Cardiovascular: Normal rate, regular rhythm, normal heart sounds and intact distal pulses.   Exam reveals no gallop and no friction rub.  No murmur heard. Pulmonary/Chest: Effort normal and breath sounds normal. No respiratory distress. She has no wheezes, rhonchi or rales. Abdominal: Soft, non-tender. Bowel sounds and aorta are normal. She exhibits no organomegaly, mass or bruit. Genitourinary: performed by gynecologist.  Breast exam:  performed by specialist.  Musculoskeletal: Normal range of motion, no synovitis. She exhibits no edema. Neurological: She is alert and oriented to person, place, and time. She has normal reflexes. No cranial nerve deficit. Coordination normal.   Skin: Skin is warm and dry. There is no rash or erythema. No suspicious lesions noted. Psychiatric: She has a normal mood and affect. Her speech is normal and behavior is normal. Judgment, cognition and memory are normal.     Assessment/Plan:    Natalya Toro was seen today for annual exam.    Diagnoses and all orders for this visit:    Fatigue, unspecified type  -     Silvia Blandon MD, Sleep Medicine, [facility]    Other orders  -     zoster recombinant adjuvanted vaccine [facility]) 50 MCG/0.5ML SUSR injection; Inject 0.5 mLs into the muscle See Admin Instructions for 1 day  -     vitamin D (ERGOCALCIFEROL) 1.25 MG (08857 UT) CAPS capsule; Take 1 capsule by mouth  once weekly  -     atorvastatin (LIPITOR) 20 MG tablet;  Take 1 tablet by mouth daily TAKE 1 TABLET BY MOUTH DAILY

## 2020-10-19 ENCOUNTER — HOSPITAL ENCOUNTER (OUTPATIENT)
Age: 60
Discharge: HOME | End: 2020-10-19
Payer: COMMERCIAL

## 2020-10-19 LAB
A/G RATIO: 1.6 (ref 1.1–2.2)
ALBUMIN SERPL-MCNC: 4.6 G/DL (ref 3.4–5)
ALP BLD-CCNC: 73 U/L (ref 40–129)
ALT SERPL-CCNC: 18 U/L (ref 10–40)
ANION GAP SERPL CALCULATED.3IONS-SCNC: 12 MMOL/L (ref 3–16)
AST SERPL-CCNC: 11 U/L (ref 15–37)
BASOPHILS ABSOLUTE: 0 K/UL (ref 0–0.2)
BASOPHILS RELATIVE PERCENT: 0.4 %
BILIRUB SERPL-MCNC: 0.5 MG/DL (ref 0–1)
BUN BLDV-MCNC: 17 MG/DL (ref 7–20)
CALCIUM SERPL-MCNC: 9.2 MG/DL (ref 8.3–10.6)
CHLORIDE BLD-SCNC: 102 MMOL/L (ref 99–110)
CHOLESTEROL, TOTAL: 141 MG/DL (ref 0–199)
CO2: 26 MMOL/L (ref 21–32)
CREAT SERPL-MCNC: <0.5 MG/DL (ref 0.6–1.1)
EOSINOPHILS ABSOLUTE: 0.2 K/UL (ref 0–0.6)
EOSINOPHILS RELATIVE PERCENT: 2.3 %
GFR AFRICAN AMERICAN: >60
GFR NON-AFRICAN AMERICAN: >60
GLOBULIN: 2.8 G/DL
GLUCOSE BLD-MCNC: 139 MG/DL (ref 70–99)
HCT VFR BLD CALC: 42 % (ref 36–48)
HDLC SERPL-MCNC: 35 MG/DL (ref 40–60)
HEMOGLOBIN: 13.7 G/DL (ref 12–16)
LDL CHOLESTEROL CALCULATED: 78 MG/DL
LYMPHOCYTES ABSOLUTE: 1.6 K/UL (ref 1–5.1)
LYMPHOCYTES RELATIVE PERCENT: 21.7 %
MCH RBC QN AUTO: 28.4 PG (ref 26–34)
MCHC RBC AUTO-ENTMCNC: 32.7 G/DL (ref 31–36)
MCV RBC AUTO: 86.8 FL (ref 80–100)
MONOCYTES ABSOLUTE: 0.7 K/UL (ref 0–1.3)
MONOCYTES RELATIVE PERCENT: 10 %
NEUTROPHILS ABSOLUTE: 4.7 K/UL (ref 1.7–7.7)
NEUTROPHILS RELATIVE PERCENT: 65.6 %
PDW BLD-RTO: 14.8 % (ref 12.4–15.4)
PLATELET # BLD: 275 K/UL (ref 135–450)
PMV BLD AUTO: 8.3 FL (ref 5–10.5)
POTASSIUM SERPL-SCNC: 4.6 MMOL/L (ref 3.5–5.1)
RBC # BLD: 4.84 M/UL (ref 4–5.2)
SODIUM BLD-SCNC: 140 MMOL/L (ref 136–145)
TOTAL PROTEIN: 7.4 G/DL (ref 6.4–8.2)
TRIGL SERPL-MCNC: 139 MG/DL (ref 0–150)
VLDLC SERPL CALC-MCNC: 28 MG/DL
WBC # BLD: 7.2 K/UL (ref 4–11)

## 2020-10-19 PROCEDURE — 80053 COMPREHEN METABOLIC PANEL: CPT

## 2020-10-19 PROCEDURE — 36415 COLL VENOUS BLD VENIPUNCTURE: CPT

## 2020-10-19 PROCEDURE — 85025 COMPLETE CBC W/AUTO DIFF WBC: CPT

## 2020-10-19 PROCEDURE — 80061 LIPID PANEL: CPT

## 2020-11-11 PROBLEM — Z00.00 WELL ADULT EXAM: Status: RESOLVED | Noted: 2017-04-05 | Resolved: 2020-11-11

## 2020-12-16 ENCOUNTER — OFFICE VISIT (OUTPATIENT)
Dept: ENDOCRINOLOGY | Age: 60
End: 2020-12-16
Payer: COMMERCIAL

## 2020-12-16 VITALS
HEIGHT: 63 IN | HEART RATE: 84 BPM | SYSTOLIC BLOOD PRESSURE: 158 MMHG | DIASTOLIC BLOOD PRESSURE: 90 MMHG | OXYGEN SATURATION: 97 % | WEIGHT: 233 LBS | BODY MASS INDEX: 41.29 KG/M2 | RESPIRATION RATE: 18 BRPM

## 2020-12-16 LAB — HBA1C MFR BLD: 7 %

## 2020-12-16 PROCEDURE — 83036 HEMOGLOBIN GLYCOSYLATED A1C: CPT | Performed by: INTERNAL MEDICINE

## 2020-12-16 PROCEDURE — 3051F HG A1C>EQUAL 7.0%<8.0%: CPT | Performed by: INTERNAL MEDICINE

## 2020-12-16 PROCEDURE — 99214 OFFICE O/P EST MOD 30 MIN: CPT | Performed by: INTERNAL MEDICINE

## 2020-12-16 RX ORDER — DULAGLUTIDE 1.5 MG/.5ML
INJECTION, SOLUTION SUBCUTANEOUS
Qty: 4 PEN | Refills: 2 | Status: SHIPPED | OUTPATIENT
Start: 2020-12-16 | End: 2021-03-16

## 2020-12-16 NOTE — PROGRESS NOTES
Seen as  patient for diabetes      Interim:    Feels better  Glucose stable    Diagnosed with Type 2 diabetes mellitus in  2005  Known diabetic complications: none   Uncontrolled, moderate    Current diabetic medications     Metformin 1gm BID  jardiance  Trulicity    Insulin pump   MN   3.0  6 am 2.0  5:30 pm 2.65    I:C  5.5  CF 22  Target 110-120    Last A1c   7%<----6.8%<---- 8.2%<----7.3%<-----   7.6%<--- 7.9 <--- -8.3    Moderate, controlled    Prior visit with dietician: no  Current diet: on average, 3 meals per day   Current exercise: walking   Current monitoring regimen: home blood tests - 2 times daily     Has brought blood glucose log/meter:yes  Home blood sugar records: 100s  Any episodes of hypoglycemia?  No recent    No Hx of CAD , PVD, CVA    Hyperlipidemia:   For   Years  Takes lipitor 20mg  Controlled  LDL 76 on 3/19   LDL 78 on 10/20    Hypertension for years  Stable  Takes  Losartan 50mg    Last eye exam: 11/19  Last foot exam: 2/20  Last microalbumin to creatinine ratio:   9/20    She has a h/o hypothyroidism    H/o QUIJANO for hyperthyroidism    S/p Thyroidectomy for thyroid Nodule: had atypical cells on FNA and patient had surgery with Dr April Damon in 2/2013, normal path, Hurtle cell adenoma;     TSH 2.68 on 3/19  TSH 1.84 on 9/18    Synthroid 125mcg cytomel 5mcg daily    She is taking Vit D 50,000 IU , weekly, needs level checked    Past Medical History:   Diagnosis Date    Cystic acne     Diverticulitis large intestine 7/7/2010    Diverticulosis of colon     Episcleritis     HTN (hypertension) 12/6/2012    Hyperemesis gravidarum     Hyperlipidemia     Hyperthyroidism     Malignant neoplasm of anterior wall of urinary bladder ([facility] [address] 1/13/2017    Pregnancies     2/   2-vaginal deliveries    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    Hurtle Cell adenoma    Type II or unspecified type diabetes mellitus without mention of complication, not stated as uncontrolled     Vitamin D deficiency 11/2012 Past Surgical History:   Procedure Laterality Date    BLADDER SURGERY  12/01/2016    cancerous tumor removed from bladder    COLONOSCOPY  2012    polyps    OTHER SURGICAL HISTORY  2003    neuro surgery- CTS repair    THYROIDECTOMY  8/2315    UMBILICAL HERNIA REPAIR  5/5/16    with mesh      Current Outpatient Medications   Medication Sig Dispense Refill    vitamin D (ERGOCALCIFEROL) 1.25 MG (56193 UT) CAPS capsule Take 1 capsule by mouth  once weekly 12 capsule 3    atorvastatin (LIPITOR) 20 MG tablet Take 1 tablet by mouth daily TAKE 1 TABLET BY MOUTH DAILY 90 tablet 3    metFORMIN (GLUCOPHAGE) 1000 MG tablet TAKE 1 TABLET 2 TIMES DAILYWITH FOOD 180 tablet 2    Dulaglutide (TRULICITY) 7.88 US/9.8FA SOPN INJECT 0.75 MG UNDER THE SKIN ONCE WEEKLY 4 pen 3    losartan (COZAAR) 50 MG tablet Take 1 tablet by mouth daily 30 tablet 2    liothyronine (CYTOMEL) 5 MCG tablet TAKE 1 TABLET DAILY 90 tablet 3    insulin lispro (HUMALOG) 100 UNIT/ML injection vial INJECT UP  UNITS     UNDER THE SKIN DAILY VIA   INSULIN PUMP 90 mL 3    levothyroxine (SYNTHROID) 125 MCG tablet Take 1 tablet by mouth Daily 90 tablet 3    JARDIANCE 25 MG tablet TAKE ONE TABLET BY MOUTH DAILY 90 tablet 2    aspirin 81 MG tablet Take 81 mg by mouth daily      Continuous Blood Gluc  (DEXCOM G6 ) DIPTI As needed 1 Device 1    Continuous Blood Gluc Sensor (DEXCOM G6 SENSOR) MISC As needed 4 each 2    Continuous Blood Gluc Transmit (DEXCOM G6 TRANSMITTER) MISC As needed 1 each 2    glucose blood VI test strips (ANAMARIA CONTOUR NEXT TEST) strip 1 each by In Vitro route 5 times daily As needed. 150 each 4     No current facility-administered medications for this visit.         Review of Systems  Scanned, reviewed      Constitutional: Well-developed, appears stated age, cooperative, in no acute distress  H/E/N/M/T:atraumatic, normocephalic, external ears, nose, lips normal without lesions  Eyes: Lids, lashes, conjunctivae and sclerae normal, No proptosis, no redness  Neck: supple, symmetrical, no swelling  Skin: No obvious rashes or lesions present. Skin and hair texture normal  Psychiatric: Judgement and Insight:  judgement and insight appear normal  Neuro: Normal without focal findings, speech is normal normal, speech is spontaneous  Chest: No labored breathing, no chest deformity, no stridor  Musculoskeletal: No joint deformity, swelling      Lab Reviewed   No components found for: CHLPL  Lab Results   Component Value Date    TRIG 139 10/19/2020    TRIG 142 03/22/2019    TRIG 184 (H) 06/08/2018     Lab Results   Component Value Date    HDL 35 (L) 10/19/2020    HDL 37 (L) 03/22/2019    HDL 38 (L) 06/08/2018     Lab Results   Component Value Date    LDLCALC 78 10/19/2020    LDLCALC 76 03/22/2019    LDLCALC 108 (H) 06/08/2018     Lab Results   Component Value Date    LABVLDL 28 10/19/2020    LABVLDL 28 03/22/2019    LABVLDL 37 06/08/2018     Lab Results   Component Value Date    LABA1C 6.8 09/18/2020       Assessment:     Lucretia Whitt is a 62 y.o. female with :    1.T2DM: Longstanding, fair control, on insulin pump and oral medication. Reviewed log, has fasting improved, adjust . Needs CHO restriction to 45 gram per meal.Check Dexcom coverage, not covered. Great response to trulicity. Will adjust dose of trulicity. 2.HTN : Blood pressure reports improved  3. HLD: LDL at goal  4. Hypothyroidism: TSH at goal, needs repeat  5. Insulin pump in place: No failures  6. Vitamin D insufficiency:  Needs level checked, on weekly dose now, She is taking  IU    Plan:      Change basal as:     MN  3.0  6 am 2.0  5:30 pm 2.65    I:C   5.5     Advise to check blood sugar 4 times a day   Patient to send blood sugar log for titration. Advise to exercise regularly. Advise to low simple carbohydrate and protein with each  meal diet. Diabetes Care: recommend yearly eye exam, foot exam and urine microalbumin to   creatinine ratio.

## 2021-01-09 RX ORDER — EMPAGLIFLOZIN 25 MG/1
TABLET, FILM COATED ORAL
Qty: 90 TABLET | Refills: 1 | Status: SHIPPED | OUTPATIENT
Start: 2021-01-09 | End: 2021-07-16

## 2021-01-15 RX ORDER — LOSARTAN POTASSIUM 50 MG/1
TABLET ORAL
Qty: 30 TABLET | Refills: 1 | Status: SHIPPED | OUTPATIENT
Start: 2021-01-15 | End: 2021-03-17 | Stop reason: SDUPTHER

## 2021-01-15 NOTE — TELEPHONE ENCOUNTER
Medication:   Requested Prescriptions     Pending Prescriptions Disp Refills    losartan (COZAAR) 50 MG tablet [Pharmacy Med Name: LOSARTAN POTASSIUM 50 MG TAB] 30 tablet 1     Sig: TAKE ONE TABLET BY MOUTH DAILY       Last Filled:      Patient Phone Number: [phone] (home)     Last appt: 12/16/2020   Next appt: 3/17/2021    Last Labs DM:   Lab Results   Component Value Date    LABA1C 7.0 12/16/2020     Last Lipid:   Lab Results   Component Value Date    CHOL 141 10/19/2020    TRIG 139 10/19/2020    HDL 35 10/19/2020    HDL 34 03/09/2012    LDLCALC 78 10/19/2020     Last PSA: No results found for: PSA  Last Thyroid:   Lab Results   Component Value Date    TSH 1.32 09/10/2019    FT3 2.6 06/08/2018    Z0KLBSW 0.91 08/22/2013    T4FREE 1.3 06/08/2018

## 2021-01-26 RX ORDER — LOSARTAN POTASSIUM 25 MG/1
TABLET ORAL
Qty: 90 TABLET | Refills: 3 | Status: SHIPPED | OUTPATIENT
Start: 2021-01-26 | End: 2021-05-26

## 2021-02-08 ENCOUNTER — TELEPHONE (OUTPATIENT)
Dept: ENDOCRINOLOGY | Age: 61
End: 2021-02-08

## 2021-02-08 NOTE — TELEPHONE ENCOUNTER
she would like to upgrade to 770G, but needs an off label.      The off label is a regular prescription which includes:  Patient name and   Diagnosis code  271N system with Guardian sensor 3 and transmitter  If patient is on dialysis, please notate that on rx   Signed and dated by provider

## 2021-02-11 ENCOUNTER — OFFICE VISIT (OUTPATIENT)
Dept: PULMONOLOGY | Age: 61
End: 2021-02-11
Payer: COMMERCIAL

## 2021-02-11 VITALS
WEIGHT: 233 LBS | BODY MASS INDEX: 41.29 KG/M2 | OXYGEN SATURATION: 100 % | HEART RATE: 85 BPM | DIASTOLIC BLOOD PRESSURE: 80 MMHG | SYSTOLIC BLOOD PRESSURE: 142 MMHG | HEIGHT: 63 IN

## 2021-02-11 DIAGNOSIS — E66.01 CLASS 3 SEVERE OBESITY DUE TO EXCESS CALORIES WITH SERIOUS COMORBIDITY AND BODY MASS INDEX (BMI) OF 40.0 TO 44.9 IN ADULT (HCC): ICD-10-CM

## 2021-02-11 DIAGNOSIS — R06.09 DOE (DYSPNEA ON EXERTION): ICD-10-CM

## 2021-02-11 DIAGNOSIS — I10 ESSENTIAL HYPERTENSION: ICD-10-CM

## 2021-02-11 DIAGNOSIS — G47.33 OBSTRUCTIVE SLEEP APNEA SYNDROME: Primary | ICD-10-CM

## 2021-02-11 PROCEDURE — 99204 OFFICE O/P NEW MOD 45 MIN: CPT | Performed by: INTERNAL MEDICINE

## 2021-02-11 ASSESSMENT — SLEEP AND FATIGUE QUESTIONNAIRES
HOW LIKELY ARE YOU TO NOD OFF OR FALL ASLEEP WHILE SITTING AND READING: 1
ESS TOTAL SCORE: 4
HOW LIKELY ARE YOU TO NOD OFF OR FALL ASLEEP WHILE SITTING INACTIVE IN A PUBLIC PLACE: 0

## 2021-02-11 NOTE — PROGRESS NOTES
PULMONARY OFFICE NEW PATIENT VISIT    CONSULTING PHYSICIAN:  Estrellita Menendez MD , No ref. provider found     REASON FOR VISIT:   Chief Complaint   Patient presents with    New Patient    Headache    Other     not able to lay flat on her back       DATE OF VISIT: 2/11/2021    HISTORY OF PRESENT ILLNESS: 62y.o. year old female comes into the pulmonary/sleep clinic for the first time. Patient reports that for last several years she has been having problems with poor quality sleep, snoring, gasping, choking and frequent awakenings at nighttime. Over the last 2 years she has gained weight which has made this problem worse. She tried getting a bed which can automatically raise the head of the bed. Despite using this bed patient continues to have poor quality sleep. She wakes up with dry mouth as well as headaches. During the day she has to drink 2 to 3 cups of coffee. Denies excessive daytime somnolence. Sleep Medicine 2/11/2021   Sitting and reading 1   Watching TV 1   Sitting, inactive in a public place (e.g. a theatre or a meeting) 0   As a passenger in a car for an hour without a break 0   Lying down to rest in the afternoon when circumstances permit 0   Sitting and talking to someone 0   Sitting quietly after a lunch without alcohol 2   In a car, while stopped for a few minutes in traffic 0   Total score 4       STOP-BANG Questionnaire    Snore Loudly Yes   Often feel Tired/Fatigued/Sleepy Yes   Observed breathing pauses Yes   Blood pressure problems Yes   BMI >35 Kg/m2 Body mass index is 41.27 kg/m². Age>50 62 y.o. Neck Circumference>16 inches 14 inches   Gender Male? female     Total 6       REVIEW OF SYSTEMS:   CONSTITUTIONAL SYMPTOMS: The patient denies fever, fatigue, night sweats, weight loss or weight gain. HEENT: No vision changes. No tinnitus, Denies sinus pain. No hoarseness, or dysphagia. NECK: Patient denies swelling in the neck.    CARDIOVASCULAR: Denies chest pain, palpitation, syncope. RESPIRATORY: Denies shortness of breath or cough. GASTROINTESTINAL: Denies nausea, abdominal pain or change in bowel function. GENITOURINARY: Denies obstructive symptoms. No history of incontinence. BREASTS: No masses or lumps in the breasts. SKIN: No rashes or itching. MUSCULOSKELETAL: Denies weakness or bone pain. NEUROLOGICAL: No headaches or seizures. PSYCHIATRIC: Denies mood swings or depression. ENDOCRINE: Denies heat or cold intolerance or excessive thirst.  HEMATOLOGIC/LYMPHATIC: Denies easy bruising or lymph node swelling. ALLERGIC/IMMUNOLOGIC: No environmental allergies. PAST MEDICAL HISTORY:   Past Medical History:   Diagnosis Date    Cystic acne     Diverticulitis large intestine 7/7/2010    Diverticulosis of colon     Episcleritis     HTN (hypertension) 12/6/2012    Hyperemesis gravidarum     Hyperlipidemia     Hyperthyroidism     Malignant neoplasm of anterior wall of urinary bladder ([facility] [address] 1/13/2017    Pregnancies     2/   2-vaginal deliveries    Thyroid follicular adenoma 43/4449    Hurtle Cell adenoma    Type II or unspecified type diabetes mellitus without mention of complication, not stated as uncontrolled     Vitamin D deficiency 11/2012       PAST SURGICAL HISTORY:   Past Surgical History:   Procedure Laterality Date    BLADDER SURGERY  12/01/2016    cancerous tumor removed from bladder    COLONOSCOPY  2012    polyps    OTHER SURGICAL HISTORY  2003    neuro surgery- CTS repair    THYROIDECTOMY  9/1126    UMBILICAL HERNIA REPAIR  5/5/16    with mesh        SOCIAL HISTORY:   Social History     Tobacco Use    Smoking status: Never Smoker    Smokeless tobacco: Never Used   Substance Use Topics    Alcohol use:  Yes     Alcohol/week: 1.0 standard drinks     Types: 1 Glasses of wine per week    Drug use: No       FAMILY HISTORY:   Family History   Problem Relation Age of Onset    Diabetes Mother     Alzheimer's Disease Mother     Heart Disease Father 45        several more       MEDICATIONS:     Current Outpatient Medications on File Prior to Visit   Medication Sig Dispense Refill    losartan (COZAAR) 25 MG tablet TAKE 1 TABLET DAILY 90 tablet 3    losartan (COZAAR) 50 MG tablet TAKE ONE TABLET BY MOUTH DAILY 30 tablet 1    JARDIANCE 25 MG tablet TAKE ONE TABLET BY MOUTH DAILY 90 tablet 1    Dulaglutide (TRULICITY) 1.5 AQ/5.1GV SOPN 1.5mg weekly 4 pen 2    vitamin D (ERGOCALCIFEROL) 1.25 MG (87799 UT) CAPS capsule Take 1 capsule by mouth  once weekly 12 capsule 3    atorvastatin (LIPITOR) 20 MG tablet Take 1 tablet by mouth daily TAKE 1 TABLET BY MOUTH DAILY 90 tablet 3    metFORMIN (GLUCOPHAGE) 1000 MG tablet TAKE 1 TABLET 2 TIMES DAILYWITH FOOD 180 tablet 2    liothyronine (CYTOMEL) 5 MCG tablet TAKE 1 TABLET DAILY 90 tablet 3    insulin lispro (HUMALOG) 100 UNIT/ML injection vial INJECT UP  UNITS     UNDER THE SKIN DAILY VIA   INSULIN PUMP 90 mL 3    levothyroxine (SYNTHROID) 125 MCG tablet Take 1 tablet by mouth Daily 90 tablet 3    aspirin 81 MG tablet Take 81 mg by mouth daily      Continuous Blood Gluc  (DEXCOM G6 ) DIPTI As needed 1 Device 1    Continuous Blood Gluc Sensor (DEXCOM G6 SENSOR) MISC As needed 4 each 2    Continuous Blood Gluc Transmit (DEXCOM G6 TRANSMITTER) MISC As needed 1 each 2    glucose blood VI test strips (ANAMARIA CONTOUR NEXT TEST) strip 1 each by In Vitro route 5 times daily As needed. 150 each 4     No current facility-administered medications on file prior to visit. ALLERGIES:   Allergies as of 02/11/2021 - Review Complete 02/11/2021   Allergen Reaction Noted    Penicillins        OBJECTIVE:   height is 5' 3\" (1.6 m) and weight is 233 lb (105.7 kg). Her blood pressure is 142/80 (abnormal) and her pulse is 85. Her oxygen saturation is 100%. PHYSICAL EXAM:    CONSTITUTIONAL: She is a 62y.o.-year-old who appears her stated age.  She is alert and oriented x 3 and in no acute distress. HEENT: PERRLA, EOMI. No scleral icterus. No thrush, atraumatic, normocephalic. Mallampati class 3 airway. NECK: Supple, without cervical or supraclavicular lymphadenopathy:  CARDIOVASCULAR: S1 S2 RRR. Without murmer  RESPIRATORY & CHEST: Lungs are clear to auscultation and percussion. No wheezing, no crackles. Good air movement  GASTROINTESTINAL & ABDOMEN: Soft, nontender, positive bowel sounds in all quadrants, non-distended, without hepatosplenomegaly. GENITOURINARY: Deferred. MUSCULOSKELETAL: No tenderness to palpation of the axial skeleton. There is no clubbing. No cyanosis. No edema of the lower extremities. SKIN OF BODY: No rash or jaundice. PSYCHIATRIC EVALUATION: Normal affect. Patient answers questions appropriately. HEMATOLOGIC/LYMPHATIC/ IMMUNOLOGIC: No palpable lymphadenopathy. NEUROLOGIC: Alert and oriented x 3. Groslly non-focal. Motor strength is 5+/5 in all muscle groups. The patient has a normal sensorium globally. LABS:    Lab Summary Latest Ref Rng & Units 10/19/2020 9/18/2020 12/20/2019   WBC 4.0 - 11.0 K/uL 7.2 - 14. 1(H)   Hgb 12.0 - 16.0 g/dL 13.7 - 13.3   Hct 36.0 - 48.0 % 42.0 - 41.3   Platelets 984 - 519 K/uL 275 - 304   Sodium 136 - 145 mmol/L 140 - 140   Potassium 3.5 - 5.1 mmol/L 4.6 - 4.3   BUN 7 - 20 mg/dL 17 - 17   Creatinine 0.6 - 1.1 mg/dL <0.5(L) - <0.5(L)   Glucose 70 - 99 mg/dL 139(H) - 146(H)   Calcium 8.3 - 10.6 mg/dL 9.2 - 10.4   Alk Phos 40 - 129 U/L 73 - -   Albumin 3.4 - 5.0 g/dL 4.6 - -   Bilirubin 0.0 - 1.0 mg/dL 0.5 - -   AST 15 - 37 U/L 11(L) - -   ALT 10 - 40 U/L 18 - -   HDL cholesterol 40 - 60 mg/dL 35(L) - -   Triglycerides 0 - 150 mg/dL 139 - -   LDL calc <100 mg/dL 78 - -   VLDL calc Not Established mg/dL 28 - -   TSH 0.27 - 4.20 uIU/mL - 1.84 -   Some recent data might be hidden         IMAGING:    No new chest imaging for me to review.     Pulmonary Functions Testing Results:      CPAP compliance summary IMPRESSION AND RECOMMENDATIONS:     1. Obstructive sleep apnea syndrome  -Patient has several features which are suggestive of obstructive sleep apnea. -I will order for an in lab polysomnography to investigate this possibility. Patient has diabetes, hypertension and morbid obesity which would make her poor candidate for getting home sleep testing done. - Baseline Diagnostic Sleep Study; Future    2. Class 3 severe obesity due to excess calories with serious comorbidity and body mass index (BMI) of 40.0 to 44.9 in adult [facility])  -I strongly advised the patient to make efforts to lose weight. I discussed various modalities including moderate intensity intermittent exercises, diet control and bariatric surgery. If the patient loses even 10 to 15% of current body weight, it will be beneficial in improving the overall health. 3. Essential hypertension  -Currently patient taking losartan. 4. WHYTE (dyspnea on exertion)  -More than likely due to morbid obesity as well as untreated obstructive sleep apnea. With treatment of sleep apnea and weight loss should improve. -If it persists, I will plan to get a complete PFT done. Return in about 6 weeks (around 3/25/2021). Emma Billings MD  Pulmonary Critical Care and Sleep Medicine  2/11/2021, 11:14 AM    This note was completed using dragon medical speech recognition software. Grammatical errors, random word insertions, pronoun errors and incomplete sentences are occasional consequences of this technology due to software limitations. If there are questions or concerns about the content of this note of information contained within the body of this dictation they should be addressed with the provider for clarification.

## 2021-02-12 DIAGNOSIS — G47.33 OSA (OBSTRUCTIVE SLEEP APNEA): Primary | ICD-10-CM

## 2021-03-10 ENCOUNTER — HOSPITAL ENCOUNTER (OUTPATIENT)
Dept: SLEEP CENTER | Age: 61
Discharge: HOME | End: 2021-03-10
Payer: COMMERCIAL

## 2021-03-10 DIAGNOSIS — G47.33 OSA (OBSTRUCTIVE SLEEP APNEA): ICD-10-CM

## 2021-03-10 PROCEDURE — 95806 SLEEP STUDY UNATT&RESP EFFT: CPT | Performed by: INTERNAL MEDICINE

## 2021-03-10 PROCEDURE — 95806 SLEEP STUDY UNATT&RESP EFFT: CPT

## 2021-03-12 DIAGNOSIS — G47.33 OSA (OBSTRUCTIVE SLEEP APNEA): Primary | ICD-10-CM

## 2021-03-16 RX ORDER — DULAGLUTIDE 1.5 MG/.5ML
INJECTION, SOLUTION SUBCUTANEOUS
Qty: 4 PEN | Refills: 1 | Status: SHIPPED | OUTPATIENT
Start: 2021-03-16 | End: 2021-05-17

## 2021-03-16 NOTE — TELEPHONE ENCOUNTER
Medication:   Requested Prescriptions     Pending Prescriptions Disp Refills    Dulaglutide (TRULICITY) 1.5 RR/8.9BE SOPN [Pharmacy Med Name: Jennifer Keno 1.5 XW/5.6 ML PEN] 4 pen 1     Sig: INJECT 1.5 MG UNDER THE SKIN ONCE WEEKLY       Last Filled:      Patient Phone Number: [phone] (home)     Last appt: 12/16/2020   Next appt: 3/17/2021    Last Labs DM:   Lab Results   Component Value Date    LABA1C 7.0 12/16/2020

## 2021-03-17 ENCOUNTER — OFFICE VISIT (OUTPATIENT)
Dept: ENDOCRINOLOGY | Age: 61
End: 2021-03-17
Payer: COMMERCIAL

## 2021-03-17 VITALS
WEIGHT: 224.8 LBS | DIASTOLIC BLOOD PRESSURE: 100 MMHG | OXYGEN SATURATION: 98 % | SYSTOLIC BLOOD PRESSURE: 150 MMHG | HEIGHT: 63 IN | BODY MASS INDEX: 39.83 KG/M2 | RESPIRATION RATE: 18 BRPM | HEART RATE: 74 BPM

## 2021-03-17 DIAGNOSIS — I10 ESSENTIAL HYPERTENSION: ICD-10-CM

## 2021-03-17 DIAGNOSIS — Z79.4 CONTROLLED TYPE 2 DIABETES MELLITUS WITHOUT COMPLICATION, WITH LONG-TERM CURRENT USE OF INSULIN (HCC): Primary | ICD-10-CM

## 2021-03-17 DIAGNOSIS — E11.9 CONTROLLED TYPE 2 DIABETES MELLITUS WITHOUT COMPLICATION, WITH LONG-TERM CURRENT USE OF INSULIN (HCC): Primary | ICD-10-CM

## 2021-03-17 PROCEDURE — 99214 OFFICE O/P EST MOD 30 MIN: CPT | Performed by: INTERNAL MEDICINE

## 2021-03-17 PROCEDURE — 83036 HEMOGLOBIN GLYCOSYLATED A1C: CPT | Performed by: INTERNAL MEDICINE

## 2021-03-17 RX ORDER — LOSARTAN POTASSIUM 100 MG/1
TABLET ORAL
Qty: 30 TABLET | Refills: 2 | Status: SHIPPED | OUTPATIENT
Start: 2021-03-17 | End: 2021-08-13

## 2021-03-17 NOTE — PROGRESS NOTES
Seen as  patient for diabetes      Interim:    Feels better  Glucose stable  She has Vakast sensor  Had some issues  Had new pump a year ago, inquiring if needs upgrade  C/o back pain    Diagnosed with Type 2 diabetes mellitus in  2005  Known diabetic complications: none   Uncontrolled, moderate    Current diabetic medications     Metformin 1gm BID  jardiance  Trulicity    Insulin pump   MN   3.0  6 am 2.0  5:30 pm 2.65    I:C  5.5  CF 22  Target 110-120    Last A1c  6.8% < ---- 7%<----6.8%<---- 8.2%<----7.3%<-----   7.6%<--- 7.9 <--- -8.3    Moderate, controlled    Prior visit with dietician: no  Current diet: on average, 3 meals per day   Current exercise: walking   Current monitoring regimen: home blood tests - 2 times daily     Has brought blood glucose log/meter:yes  Home blood sugar records:   Any episodes of hypoglycemia?  No recent    No Hx of CAD , PVD, CVA    Hyperlipidemia:   For   Years  Takes lipitor 20mg  Controlled  LDL 76 on 3/19   LDL 78 on 10/20    Hypertension for years  Stable  Takes  Losartan 50mg    Last eye exam: 11/19  Last foot exam: 2/20  Last microalbumin to creatinine ratio:   9/20    She has a h/o hypothyroidism    H/o QUIJANO for hyperthyroidism    S/p Thyroidectomy for thyroid Nodule: had atypical cells on FNA and patient had surgery with Dr Britta Matthew in 2/2013, normal path, Hurtle cell adenoma;     TSH 2.68 on 3/19  TSH 1.84 on 9/20    Synthroid 125mcg cytomel 5mcg daily    She is taking Vit D 50,000 IU , weekly, needs level checked    Past Medical History:   Diagnosis Date    Cystic acne     Diverticulitis large intestine 7/7/2010    Diverticulosis of colon     Episcleritis     HTN (hypertension) 12/6/2012    Hyperemesis gravidarum     Hyperlipidemia     Hyperthyroidism     Malignant neoplasm of anterior wall of urinary bladder (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 Skin and hair texture normal  Psychiatric: Judgement and Insight:  judgement and insight appear normal  Neuro: Normal without focal findings, speech is normal normal, speech is spontaneous  Chest: No labored breathing, no chest deformity, no stridor  Musculoskeletal: No joint deformity, swelling      Lab Reviewed   No components found for: CHLPL  Lab Results   Component Value Date    TRIG 139 10/19/2020    TRIG 142 03/22/2019    TRIG 184 (H) 06/08/2018     Lab Results   Component Value Date    HDL 35 (L) 10/19/2020    HDL 37 (L) 03/22/2019    HDL 38 (L) 06/08/2018     Lab Results   Component Value Date    LDLCALC 78 10/19/2020    LDLCALC 76 03/22/2019    LDLCALC 108 (H) 06/08/2018     Lab Results   Component Value Date    LABVLDL 28 10/19/2020    LABVLDL 28 03/22/2019    LABVLDL 37 06/08/2018     Lab Results   Component Value Date    LABA1C 7.0 12/16/2020       Assessment:     Yanna Horan is a 62 y.o. female with :    1.T2DM: Longstanding, fair control, on insulin pump and oral medication. Reviewed log, stable. Needs CHO restriction to 45 gram per meal.Great response to trulicity. Advised given stable control, does not need upgrade. No major fluctuation  2. HTN : Blood pressure high, increase losartan  Does have some dizziness/vertigo ,will see PCP  3. HLD: LDL at goal  4. Hypothyroidism: TSH at goal  5. Insulin pump in place: No failures  6. Vitamin D insufficiency:  Last level normal, on weekly dose now, She is taking  IU    Plan:     basal as:     MN  3.0  6 am 2.0  5:30 pm 2.65    I:C   5.5     Advise to check blood sugar 4 times a day   Patient to send blood sugar log for titration. Advise to exercise regularly. Advise to low simple carbohydrate and protein with each  meal diet. Diabetes Care: recommend yearly eye exam, foot exam and urine microalbumin to   creatinine ratio.

## 2021-03-18 RX ORDER — LOSARTAN POTASSIUM 50 MG/1
TABLET ORAL
Qty: 30 TABLET | Refills: 0 | OUTPATIENT
Start: 2021-03-18

## 2021-03-19 ENCOUNTER — TELEPHONE (OUTPATIENT)
Dept: PULMONOLOGY | Age: 61
End: 2021-03-19

## 2021-03-26 ENCOUNTER — TELEPHONE (OUTPATIENT)
Dept: PULMONOLOGY | Age: 61
End: 2021-03-26

## 2021-03-26 NOTE — TELEPHONE ENCOUNTER
Insurance has denied titration study Will need to do peer to peer [phone] and use Member ID # [id number] Have 10 business days to call.

## 2021-04-14 ENCOUNTER — TELEPHONE (OUTPATIENT)
Dept: PULMONOLOGY | Age: 61
End: 2021-04-14

## 2021-04-14 NOTE — TELEPHONE ENCOUNTER
Pt called and said she got her cpap yesterday and used it last night. She said she had a bad night. She felt like she couldn't get breathing regulated, felt like she was suffocated, and gave her panic attacks. She took it off.     Please call [phone]

## 2021-04-15 ENCOUNTER — VIRTUAL VISIT (OUTPATIENT)
Dept: PULMONOLOGY | Age: 61
End: 2021-04-15
Payer: COMMERCIAL

## 2021-04-15 DIAGNOSIS — E66.01 CLASS 3 SEVERE OBESITY DUE TO EXCESS CALORIES WITH SERIOUS COMORBIDITY AND BODY MASS INDEX (BMI) OF 40.0 TO 44.9 IN ADULT (HCC): ICD-10-CM

## 2021-04-15 DIAGNOSIS — G47.33 OBSTRUCTIVE SLEEP APNEA SYNDROME: Primary | ICD-10-CM

## 2021-04-15 DIAGNOSIS — I10 ESSENTIAL HYPERTENSION: ICD-10-CM

## 2021-04-15 PROCEDURE — 99213 OFFICE O/P EST LOW 20 MIN: CPT | Performed by: INTERNAL MEDICINE

## 2021-04-15 NOTE — PROGRESS NOTES
7/7/2010    Diverticulosis of colon     Episcleritis     HTN (hypertension) 12/6/2012    Hyperemesis gravidarum     Hyperlipidemia     Hyperthyroidism     Malignant neoplasm of anterior wall of urinary bladder ([facility] [address] 1/13/2017    Pregnancies     2/   2-vaginal deliveries    Thyroid follicular adenoma 41/4910    Hurtle Cell adenoma    Type II or unspecified type diabetes mellitus without mention of complication, not stated as uncontrolled     Vitamin D deficiency 11/2012       PAST SURGICAL HISTORY:   Past Surgical History:   Procedure Laterality Date    BLADDER SURGERY  12/01/2016    cancerous tumor removed from bladder    COLONOSCOPY  2012    polyps    OTHER SURGICAL HISTORY  2003    neuro surgery- CTS repair    THYROIDECTOMY  4/6969    UMBILICAL HERNIA REPAIR  5/5/16    with mesh        SOCIAL HISTORY:   Social History     Tobacco Use    Smoking status: Never Smoker    Smokeless tobacco: Never Used   Substance Use Topics    Alcohol use:  Yes     Alcohol/week: 1.0 standard drinks     Types: 1 Glasses of wine per week    Drug use: No       FAMILY HISTORY:   Family History   Problem Relation Age of Onset    Diabetes Mother     Alzheimer's Disease Mother     Heart Disease Father 45        several more       MEDICATIONS:     Current Outpatient Medications on File Prior to Visit   Medication Sig Dispense Refill    losartan (COZAAR) 100 MG tablet TAKE ONE TABLET BY MOUTH DAILY 30 tablet 2    Dulaglutide (TRULICITY) 1.5 EO/8.6TX SOPN INJECT 1.5 MG UNDER THE SKIN ONCE WEEKLY 4 pen 1    losartan (COZAAR) 25 MG tablet TAKE 1 TABLET DAILY 90 tablet 3    JARDIANCE 25 MG tablet TAKE ONE TABLET BY MOUTH DAILY 90 tablet 1    vitamin D (ERGOCALCIFEROL) 1.25 MG (86334 UT) CAPS capsule Take 1 capsule by mouth  once weekly 12 capsule 3    atorvastatin (LIPITOR) 20 MG tablet Take 1 tablet by mouth daily TAKE 1 TABLET BY MOUTH DAILY 90 tablet 3    metFORMIN (GLUCOPHAGE) 1000 MG tablet TAKE 1 TABLET 2 TIMES 9/18/2020   WBC 4.0 - 11.0 K/uL 7.2 -   Hgb 12.0 - 16.0 g/dL 13.7 -   Hct 36.0 - 48.0 % 42.0 -   Platelets 841 - 362 K/uL 275 -   Sodium 136 - 145 mmol/L 140 -   Potassium 3.5 - 5.1 mmol/L 4.6 -   BUN 7 - 20 mg/dL 17 -   Creatinine 0.6 - 1.1 mg/dL <0.5(L) -   Glucose 70 - 99 mg/dL 139(H) -   Calcium 8.3 - 10.6 mg/dL 9.2 -   Alk Phos 40 - 129 U/L 73 -   Albumin 3.4 - 5.0 g/dL 4.6 -   Bilirubin 0.0 - 1.0 mg/dL 0.5 -   AST 15 - 37 U/L 11(L) -   ALT 10 - 40 U/L 18 -   HDL cholesterol 40 - 60 mg/dL 35(L) -   Triglycerides 0 - 150 mg/dL 139 -   LDL calc <100 mg/dL 78 -   VLDL calc Not Established mg/dL 28 -   TSH 0.27 - 4.20 uIU/mL - 1.84   Some recent data might be hidden         IMAGING:    No new chest imaging for me to review. Pulmonary Functions Testing Results:        Home sleep testing done on 10/10/2021  RDI: 20/h  Lowest oxygen saturation: 74%  Time spent below an oxygen saturation of 88%: 54.4 minutes    Patient started on auto titrating CPAP 6 to 18 cm H2O    IMPRESSION AND RECOMMENDATIONS:     1. Obstructive sleep apnea syndrome  -Patient has moderate obstructive sleep apnea with severe oxygen desaturation.  -I would start the patient on auto titrating CPAP 6 to 18 cm H2O with a full facemask. She reported feeling of something smothering her as well as anxiety.  -I will change the patient to a nasal mask. We will increase the pressure settings to 9- 18 cmH2O. Strongly encouraged the patient to continue using the therapy in order to achieve benefit from it. Patient verbalized understanding. 2. Class 3 severe obesity due to excess calories with serious comorbidity and body mass index (BMI) of 40.0 to 44.9 in [facility])  -I again advised the patient to make efforts to lose weight. I discussed various modalities including moderate intensity intermittent exercises, diet control and bariatric surgery.   If the patient loses even 10 to 15% of current body weight, it will be beneficial in improving the overall health. 3. Essential hypertension  -Currently patient taking losartan. 4. WHYTE (dyspnea on exertion)  -More than likely due to morbid obesity as well as untreated obstructive sleep apnea. With treatment of sleep apnea and weight loss should improve. -If it persists, I will plan to get a complete PFT done. Return in about 3 months (around 7/15/2021). Ally Carpenter MD  Pulmonary Critical Care and Sleep Medicine  4/15/2021, 2:50 PM    This note was completed using dragon medical speech recognition software. Grammatical errors, random word insertions, pronoun errors and incomplete sentences are occasional consequences of this technology due to software limitations. If there are questions or concerns about the content of this note of information contained within the body of this dictation they should be addressed with the provider for clarification.

## 2021-04-29 ENCOUNTER — TELEPHONE (OUTPATIENT)
Dept: PULMONOLOGY | Age: 61
End: 2021-04-29

## 2021-04-29 NOTE — TELEPHONE ENCOUNTER
Pt called and said her cpap machine is giving her severe panic attacks and extreme anxiety. Pt said she can not even look at the machine without having a panic attack. Symptoms of panic attack include: difficulty breathing, throwing up, diarrhea, and shaking. Pt stated it took her a week to get up the courage and call us because she does not want to have to use the machine ever again.     Call pt at [phone]

## 2021-05-17 RX ORDER — DULAGLUTIDE 1.5 MG/.5ML
INJECTION, SOLUTION SUBCUTANEOUS
Qty: 4 PEN | Refills: 1 | Status: SHIPPED | OUTPATIENT
Start: 2021-05-17 | End: 2021-07-22

## 2021-05-17 NOTE — TELEPHONE ENCOUNTER
Medication:   Requested Prescriptions     Pending Prescriptions Disp Refills    TRULICITY 1.5 NH/6.0LS SOPN [Pharmacy Med Name: Isha Arreola 1.5 UJ/9.9 ML PEN]  0     Sig: INJECT 1.5 MG UNDER THE SKIN ONCE WEEKLY       Last appt: 3/17/2021   Next appt: Due 06/17/2021    Last Labs DM:   Lab Results   Component Value Date    LABA1C 7.0 12/16/2020

## 2021-05-18 RX ORDER — LEVOTHYROXINE SODIUM 125 MCG
TABLET ORAL
Qty: 90 TABLET | Refills: 3 | Status: SHIPPED | OUTPATIENT
Start: 2021-05-18 | End: 2022-05-13

## 2021-05-26 ENCOUNTER — OFFICE VISIT (OUTPATIENT)
Dept: FAMILY MEDICINE CLINIC | Age: 61
End: 2021-05-26
Payer: COMMERCIAL

## 2021-05-26 VITALS
SYSTOLIC BLOOD PRESSURE: 120 MMHG | TEMPERATURE: 97.5 F | HEART RATE: 84 BPM | DIASTOLIC BLOOD PRESSURE: 80 MMHG | HEIGHT: 63 IN | WEIGHT: 220 LBS | OXYGEN SATURATION: 98 % | BODY MASS INDEX: 38.98 KG/M2

## 2021-05-26 DIAGNOSIS — I10 ESSENTIAL HYPERTENSION: ICD-10-CM

## 2021-05-26 DIAGNOSIS — E11.65 UNCONTROLLED TYPE 2 DIABETES MELLITUS WITH HYPERGLYCEMIA (HCC): ICD-10-CM

## 2021-05-26 DIAGNOSIS — M47.816 OSTEOARTHRITIS OF FACET JOINT OF LUMBAR SPINE: ICD-10-CM

## 2021-05-26 PROCEDURE — 99213 OFFICE O/P EST LOW 20 MIN: CPT | Performed by: FAMILY MEDICINE

## 2021-05-26 RX ORDER — TIZANIDINE 2 MG/1
2 TABLET ORAL 3 TIMES DAILY PRN
Qty: 30 TABLET | Refills: 2 | Status: SHIPPED | OUTPATIENT
Start: 2021-05-26

## 2021-05-26 ASSESSMENT — ENCOUNTER SYMPTOMS
ABDOMINAL PAIN: 0
BOWEL INCONTINENCE: 0
BACK PAIN: 1

## 2021-05-26 ASSESSMENT — PATIENT HEALTH QUESTIONNAIRE - PHQ9
SUM OF ALL RESPONSES TO PHQ QUESTIONS 1-9: 0
SUM OF ALL RESPONSES TO PHQ9 QUESTIONS 1 & 2: 0
1. LITTLE INTEREST OR PLEASURE IN DOING THINGS: 0
2. FEELING DOWN, DEPRESSED OR HOPELESS: 0
SUM OF ALL RESPONSES TO PHQ QUESTIONS 1-9: 0

## 2021-05-26 NOTE — PROGRESS NOTES
Subjective:     Patient Ginny Louie is a 62 y.o. female. Back Pain  This is a recurrent problem. The problem occurs constantly. The problem has been waxing and waning since onset. The pain is present in the lumbar spine. The quality of the pain is described as aching. The pain does not radiate. The pain is mild. The pain is the same all the time. The symptoms are aggravated by bending. Stiffness is present all day. Pertinent negatives include no abdominal pain, bladder incontinence, bowel incontinence, chest pain, dysuria, fever, headaches, leg pain, numbness, paresis, paresthesias, pelvic pain, perianal numbness, tingling, weakness or weight loss. Risk factors include obesity. She has tried nothing for the symptoms.    feels\"umps\"    Allergies   Allergen Reactions    Penicillins        Current Outpatient Medications   Medication Sig Dispense Refill    tiZANidine (ZANAFLEX) 2 MG tablet Take 1 tablet by mouth 3 times daily as needed (spasm) 30 tablet 2    diclofenac sodium (VOLTAREN) 1 % GEL Apply topically 2 times daily 45 g 3    SYNTHROID 125 MCG tablet TAKE 1 TABLET DAILY 90 tablet 3    TRULICITY 1.5 TZ/0.0XO SOPN INJECT 1.5 MG UNDER THE SKIN ONCE WEEKLY 4 pen 1    losartan (COZAAR) 100 MG tablet TAKE ONE TABLET BY MOUTH DAILY 30 tablet 2    JARDIANCE 25 MG tablet TAKE ONE TABLET BY MOUTH DAILY 90 tablet 1    vitamin D (ERGOCALCIFEROL) 1.25 MG (04016 UT) CAPS capsule Take 1 capsule by mouth  once weekly 12 capsule 3    atorvastatin (LIPITOR) 20 MG tablet Take 1 tablet by mouth daily TAKE 1 TABLET BY MOUTH DAILY 90 tablet 3    metFORMIN (GLUCOPHAGE) 1000 MG tablet TAKE 1 TABLET 2 TIMES DAILYWITH FOOD 180 tablet 2    liothyronine (CYTOMEL) 5 MCG tablet TAKE 1 TABLET DAILY 90 tablet 3    insulin lispro (HUMALOG) 100 UNIT/ML injection vial INJECT UP  UNITS     UNDER THE SKIN DAILY VIA   INSULIN PUMP 90 mL 3    aspirin 81 MG tablet Take 81 mg by mouth daily      Continuous Blood Gluc  (DEXCOM G6 ) DIPTI As needed 1 Device 1    Continuous Blood Gluc Sensor (DEXCOM G6 SENSOR) MISC As needed 4 each 2    glucose blood VI test strips (ANAMARIA CONTOUR NEXT TEST) strip 1 each by In Vitro route 5 times daily As needed. 150 each 4     No current facility-administered medications for this visit. Past Medical History:   Diagnosis Date    Cystic acne     Diverticulitis large intestine 7/7/2010    Diverticulosis of colon     Episcleritis     HTN (hypertension) 12/6/2012    Hyperemesis gravidarum     Hyperlipidemia     Hyperthyroidism     Malignant neoplasm of anterior wall of urinary bladder ([facility] [address] 1/13/2017    Pregnancies     2/   2-vaginal deliveries    Thyroid follicular adenoma 37/0498    Hurtle Cell adenoma    Type II or unspecified type diabetes mellitus without mention of complication, not stated as uncontrolled     Vitamin D deficiency 11/2012       Past Surgical History:   Procedure Laterality Date    BLADDER SURGERY  12/01/2016    cancerous tumor removed from bladder    COLONOSCOPY  2012    polyps    OTHER SURGICAL HISTORY  2003    neuro surgery- CTS repair    THYROIDECTOMY  3/5581    UMBILICAL HERNIA REPAIR  5/5/16    with mesh        Social History     Socioeconomic History    Marital status:      Spouse name: Not on file    Number of children: 2    Years of education: Not on file    Highest education level: Not on file   Occupational History    Occupation: [employer]   Tobacco Use    Smoking status: Never Smoker    Smokeless tobacco: Never Used   Vaping Use    Vaping Use: Never used   Substance and Sexual Activity    Alcohol use:  Yes     Alcohol/week: 1.0 standard drinks     Types: 1 Glasses of wine per week    Drug use: No    Sexual activity: Yes     Partners: Male   Other Topics Concern    Not on file   Social History Narrative    [place]    + seatbelts    Happily     Hobbies--none     Social Determinants of Health     Financial Resource Strain: Low Risk     Difficulty of Paying Living Expenses: Not hard at all   Food Insecurity: No Food Insecurity    Worried About Running Out of Food in the Last Year: Never true    Lucille of Food in the Last Year: Never true   Transportation Needs: No Transportation Needs    Lack of Transportation (Medical): No    Lack of Transportation (Non-Medical): No   Physical Activity:     Days of Exercise per Week:     Minutes of Exercise per Session:    Stress:     Feeling of Stress :    Social Connections:     Frequency of Communication with Friends and Family:     Frequency of Social Gatherings with Friends and Family:     Attends [employer] Services:     Active Member of Clubs or Organizations:     Attends Club or Organization Meetings:     Marital Status:    Intimate Partner Violence:     Fear of Current or Ex-Partner:     Emotionally Abused:     Physically Abused:     Sexually Abused:        Family History   Problem Relation Age of Onset    Diabetes Mother     Alzheimer's Disease Mother     Heart Disease Father 45        several more       Immunization History   Administered Date(s) Administered    COVID-19, Rhodes Peter, PF, 30mcg/0.3mL 03/05/2021, 04/02/2021    Influenza Virus Vaccine 11/01/2014, 10/25/2019, 09/18/2020    Influenza Whole 11/01/2014    Influenza, MDCK Quadv, IM, PF (Flucelvax 4 yrs and older) 11/09/2017    Influenza, Quadv, IM, PF (6 mo and older Fluzone, Flulaval, Fluarix, and 3 yrs and older Afluria) 10/26/2018, 10/25/2019, 09/18/2020    Pneumococcal Polysaccharide ([id number]) 10/06/2016    Tdap (Boostrix, Adacel) 05/05/2004, 09/21/2016    Tetanus 05/05/2004       Review of Systems  Review of Systems   Constitutional: Negative for fever and weight loss. Cardiovascular: Negative for chest pain. Gastrointestinal: Negative for abdominal pain and bowel incontinence.    Genitourinary: Negative for bladder incontinence, dysuria and pelvic pain. Musculoskeletal: Positive for back pain. Neurological: Negative for tingling, weakness, numbness, headaches and paresthesias. Objective:   Physical Exam  Physical Exam  Vitals reviewed. Constitutional:       General: She is not in acute distress. Appearance: She is well-developed. Eyes:      Conjunctiva/sclera: Conjunctivae normal.      Pupils: Pupils are equal, round, and reactive to light. Neck:      Thyroid: No thyromegaly. Vascular: No JVD. Trachea: No tracheal deviation. Cardiovascular:      Rate and Rhythm: Normal rate and regular rhythm. Heart sounds: Normal heart sounds. No murmur heard. No gallop. Pulmonary:      Effort: Pulmonary effort is normal. No respiratory distress. Breath sounds: Normal breath sounds. No stridor. No wheezing or rales. Chest:      Chest wall: No tenderness. Abdominal:      General: Bowel sounds are normal. There is no distension. Palpations: Abdomen is soft. There is no mass. Tenderness: There is no abdominal tenderness. Musculoskeletal:         General: No tenderness. Lymphadenopathy:      Cervical: No cervical adenopathy. Skin:     General: Skin is warm and dry. Coloration: Skin is not pale. Findings: No erythema or rash. Neurological:      Mental Status: She is alert and oriented to person, place, and time. Cranial Nerves: No cranial nerve deficit. Motor: No abnormal muscle tone. Coordination: Coordination normal.      Deep Tendon Reflexes: Reflexes normal.   Psychiatric:         Behavior: Behavior normal.         Thought Content:  Thought content normal.         Judgment: Judgment normal.         Assessment and Plan:     Uncontrolled type 2 diabetes mellitus with hyperglycemia (HCC)  A1c very good     Essential hypertension   Well-controlled, continue current medications    Osteoarthritis of facet joint of lumbar spine   tizanidene and voltaren gel

## 2021-07-16 RX ORDER — EMPAGLIFLOZIN 25 MG/1
TABLET, FILM COATED ORAL
Qty: 90 TABLET | Refills: 0 | Status: SHIPPED | OUTPATIENT
Start: 2021-07-16 | End: 2021-12-02

## 2021-07-22 RX ORDER — DULAGLUTIDE 1.5 MG/.5ML
INJECTION, SOLUTION SUBCUTANEOUS
Qty: 4 PEN | Refills: 3 | Status: SHIPPED | OUTPATIENT
Start: 2021-07-22 | End: 2021-12-06

## 2021-07-22 NOTE — TELEPHONE ENCOUNTER
Medication:   Requested Prescriptions     Pending Prescriptions Disp Refills    TRULICITY 1.5 FH/3.1XG SOPN [Pharmacy Med Name: Lender Mention 1.5 FERNANDA/6.6 ML PEN] 4 pen 3     Sig: INJECT 1.5 MG UNDER THE SKIN ONCE WEEKLY       Last Filled:      Patient Phone Number: [phone] (home)     Last appt: 3/17/2021   Next appt: 8/9/2021    Last Labs DM:   Lab Results   Component Value Date    LABA1C 7.0 12/16/2020

## 2021-08-09 ENCOUNTER — OFFICE VISIT (OUTPATIENT)
Dept: ENDOCRINOLOGY | Age: 61
End: 2021-08-09
Payer: COMMERCIAL

## 2021-08-09 VITALS
WEIGHT: 219 LBS | BODY MASS INDEX: 38.8 KG/M2 | DIASTOLIC BLOOD PRESSURE: 73 MMHG | HEART RATE: 74 BPM | HEIGHT: 63 IN | OXYGEN SATURATION: 98 % | SYSTOLIC BLOOD PRESSURE: 153 MMHG

## 2021-08-09 DIAGNOSIS — Z79.4 CONTROLLED TYPE 2 DIABETES MELLITUS WITHOUT COMPLICATION, WITH LONG-TERM CURRENT USE OF INSULIN (HCC): Primary | ICD-10-CM

## 2021-08-09 DIAGNOSIS — E11.9 CONTROLLED TYPE 2 DIABETES MELLITUS WITHOUT COMPLICATION, WITH LONG-TERM CURRENT USE OF INSULIN (HCC): Primary | ICD-10-CM

## 2021-08-09 LAB — HBA1C MFR BLD: 6.8 %

## 2021-08-09 PROCEDURE — 83036 HEMOGLOBIN GLYCOSYLATED A1C: CPT | Performed by: INTERNAL MEDICINE

## 2021-08-09 PROCEDURE — 99214 OFFICE O/P EST MOD 30 MIN: CPT | Performed by: INTERNAL MEDICINE

## 2021-08-09 NOTE — PROGRESS NOTES
Seen as  patient for diabetes      Interim:    Feels better  Glucose stable  She has [employer] sensor  Had some issues  Had new pump a year ago, inquiring if needs upgrade  C/o back pain    Diagnosed with Type 2 diabetes mellitus in  2005  Known diabetic complications: none   Uncontrolled, moderate    Current diabetic medications     Metformin 1gm BID  jardiance  Trulicity    Insulin pump   MN   3.0  6 am 2.0  5:30 pm 2.65    I:C  5.5  CF 22  Target 110-120    Last A1c  6.8% < ---- 7%<----6.8%<---- 8.2%<----7.3%<-----   7.6%<--- 7.9 <--- -8.3    Moderate, controlled    Prior visit with dietician: no  Current diet: on average, 3 meals per day   Current exercise: walking   Current monitoring regimen: home blood tests - 2 times daily     Has brought blood glucose log/meter:yes  Home blood sugar records: 103-200  Any episodes of hypoglycemia?  No recent    No Hx of CAD , PVD, CVA    Hyperlipidemia:   For   Years  Takes lipitor 20mg  Controlled  LDL 76 on 3/19   LDL 78 on 10/20    Hypertension for years  Stable  Takes  Losartan 100mg    Last eye exam:2021  Last foot exam: 8/21  Last microalbumin to creatinine ratio:   9/20    She has a h/o hypothyroidism    H/o QUIJANO for hyperthyroidism    S/p Thyroidectomy for thyroid Nodule: had atypical cells on FNA and patient had surgery with Dr Betsy Holloway in 2/2013, normal path, Hurtle cell adenoma;     TSH 2.68 on 3/19  TSH 1.84 on 9/20    Synthroid 125mcg cytomel 5mcg daily    She is taking Vit D 50,000 IU , weekly, needs level checked    Past Medical History:   Diagnosis Date    Cystic acne     Diverticulitis large intestine 7/7/2010    Diverticulosis of colon     Episcleritis     HTN (hypertension) 12/6/2012    Hyperemesis gravidarum     Hyperlipidemia     Hyperthyroidism     Malignant neoplasm of anterior wall of urinary bladder ([facility] [address] 1/13/2017    Pregnancies     2/   2-vaginal deliveries    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    Hurtle Cell adenoma    Type II or unspecified type diabetes mellitus without mention of complication, not stated as uncontrolled     Vitamin D deficiency 11/2012     Past Surgical History:   Procedure Laterality Date    BLADDER SURGERY  12/01/2016    cancerous tumor removed from bladder    COLONOSCOPY  2012    polyps    OTHER SURGICAL HISTORY  2003    neuro surgery- CTS repair    THYROIDECTOMY  8/3180    UMBILICAL HERNIA REPAIR  5/5/16    with mesh      Current Outpatient Medications   Medication Sig Dispense Refill    TRULICITY 1.5 JN/4.0JV SOPN INJECT 1.5 MG UNDER THE SKIN ONCE WEEKLY 4 pen 3    JARDIANCE 25 MG tablet TAKE ONE TABLET BY MOUTH DAILY 90 tablet 0    metFORMIN (GLUCOPHAGE) 1000 MG tablet TAKE 1 TABLET 2 TIMES DAILYWITH FOOD 180 tablet 2    tiZANidine (ZANAFLEX) 2 MG tablet Take 1 tablet by mouth 3 times daily as needed (spasm) 30 tablet 2    diclofenac sodium (VOLTAREN) 1 % GEL Apply topically 2 times daily 45 g 3    SYNTHROID 125 MCG tablet TAKE 1 TABLET DAILY 90 tablet 3    losartan (COZAAR) 100 MG tablet TAKE ONE TABLET BY MOUTH DAILY 30 tablet 2    vitamin D (ERGOCALCIFEROL) 1.25 MG (25348 UT) CAPS capsule Take 1 capsule by mouth  once weekly 12 capsule 3    atorvastatin (LIPITOR) 20 MG tablet Take 1 tablet by mouth daily TAKE 1 TABLET BY MOUTH DAILY 90 tablet 3    liothyronine (CYTOMEL) 5 MCG tablet TAKE 1 TABLET DAILY 90 tablet 3    insulin lispro (HUMALOG) 100 UNIT/ML injection vial INJECT UP  UNITS     UNDER THE SKIN DAILY VIA   INSULIN PUMP 90 mL 3    aspirin 81 MG tablet Take 81 mg by mouth daily      Continuous Blood Gluc  (DEXCOM G6 ) DIPTI As needed 1 Device 1    Continuous Blood Gluc Sensor (DEXCOM G6 SENSOR) MISC As needed 4 each 2    glucose blood VI test strips (ANAMARIA CONTOUR NEXT TEST) strip 1 each by In Vitro route 5 times daily As needed. 150 each 4     No current facility-administered medications for this visit.        Review of Systems  Scanned, reviewed    Vitals: 08/09/21 1553   BP: (!) 188/84   Pulse: 74   SpO2: 98%       Constitutional: Well-developed, appears stated age, cooperative, in no acute distress  H/E/N/M/T:atraumatic, normocephalic, external ears, nose, lips normal without lesions  Eyes: Lids, lashes, conjunctivae and sclerae normal, No proptosis, no redness  Neck: supple, symmetrical, no swelling  Skin: No obvious rashes or lesions present. Skin and hair texture normal  Psychiatric: Judgement and Insight:  judgement and insight appear normal  Neuro: Normal without focal findings, speech is normal normal, speech is spontaneous  Chest: No labored breathing, no chest deformity, no stridor  Musculoskeletal: No joint deformity, swelling  Foot exam:  Monofilament detected , no ulcers    Lab Reviewed   No components found for: CHLPL  Lab Results   Component Value Date    TRIG 139 10/19/2020    TRIG 142 03/22/2019    TRIG 184 (H) 06/08/2018     Lab Results   Component Value Date    HDL 35 (L) 10/19/2020    HDL 37 (L) 03/22/2019    HDL 38 (L) 06/08/2018     Lab Results   Component Value Date    LDLCALC 78 10/19/2020    LDLCALC 76 03/22/2019    LDLCALC 108 (H) 06/08/2018     Lab Results   Component Value Date    LABVLDL 28 10/19/2020    LABVLDL 28 03/22/2019    LABVLDL 37 06/08/2018     Lab Results   Component Value Date    LABA1C 7.0 12/16/2020       Assessment:     Floyd Townsend is a 62 y.o. female with :    1.T2DM: Longstanding, fair control, on insulin pump and oral medication. Reviewed log, stable. Needs CHO restriction to 45 gram per meal.Great response to trulicity. Advised given stable control, does not need upgrade. No major fluctuation  2. HTN : Blood pressure high, reports stress  3. HLD: LDL at goal  4. Hypothyroidism: TSH at goal  5. Insulin pump in place: No failures  6. Vitamin D insufficiency:  Last level normal, on weekly dose now, She is taking  IU  7. Back pain:  She takes marijuana , inquiring about statin interaction, advised can monitor for symptoms    Plan:     basal as:     MN  3.0  6 am 2.0  5:30 pm 2.65    I:C   5.5     Advise to check blood sugar 4 times a day   Patient to send blood sugar log for titration. Advise to exercise regularly. Advise to low simple carbohydrate and protein with each  meal diet. Diabetes Care: recommend yearly eye exam, foot exam and urine microalbumin to   creatinine ratio.      Patient would like to upgrade pump

## 2021-08-13 RX ORDER — LOSARTAN POTASSIUM 100 MG/1
TABLET ORAL
Qty: 30 TABLET | Refills: 2 | Status: SHIPPED | OUTPATIENT
Start: 2021-08-13 | End: 2021-11-29

## 2021-08-13 NOTE — TELEPHONE ENCOUNTER
Medication:   Requested Prescriptions     Pending Prescriptions Disp Refills    losartan (COZAAR) 100 MG tablet [Pharmacy Med Name: LOSARTAN POTASSIUM 100 MG TAB] 30 tablet 2     Sig: TAKE ONE TABLET BY MOUTH DAILY       Last Filled:      Patient Phone Number: [phone] (home)     Last appt: 8/9/2021   Next appt: 11/30/2021    Last Labs DM:   Lab Results   Component Value Date    LABA1C 6.8 08/09/2021     Last Lipid:   Lab Results   Component Value Date    CHOL 141 10/19/2020    TRIG 139 10/19/2020    HDL 35 10/19/2020    HDL 34 03/09/2012    LDLCALC 78 10/19/2020     Last PSA: No results found for: PSA  Last Thyroid:   Lab Results   Component Value Date    TSH 1.32 09/10/2019    FT3 2.6 06/08/2018    M6GAAXA 0.91 08/22/2013    T4FREE 1.3 06/08/2018

## 2021-08-16 ENCOUNTER — TELEPHONE (OUTPATIENT)
Dept: ENDOCRINOLOGY | Age: 61
End: 2021-08-16

## 2021-08-16 DIAGNOSIS — E11.9 CONTROLLED TYPE 2 DIABETES MELLITUS WITHOUT COMPLICATION, WITH LONG-TERM CURRENT USE OF INSULIN (HCC): Primary | ICD-10-CM

## 2021-08-16 DIAGNOSIS — Z79.4 CONTROLLED TYPE 2 DIABETES MELLITUS WITHOUT COMPLICATION, WITH LONG-TERM CURRENT USE OF INSULIN (HCC): Primary | ICD-10-CM

## 2021-08-16 NOTE — TELEPHONE ENCOUNTER
Can you print rx for her pump upgrade        Ed Davila, 1960  Diagnosis  Rx:  Medtronic 770G insulin pump, Guardian Sensor 3 and transmitter  Dr. West Vaz and date

## 2021-08-26 RX ORDER — LIOTHYRONINE SODIUM 5 UG/1
TABLET ORAL
Qty: 90 TABLET | Refills: 3 | Status: SHIPPED | OUTPATIENT
Start: 2021-08-26 | End: 2022-08-16

## 2021-08-30 RX ORDER — ERGOCALCIFEROL 1.25 MG/1
CAPSULE ORAL
Qty: 12 CAPSULE | Refills: 3 | Status: SHIPPED | OUTPATIENT
Start: 2021-08-30 | End: 2022-08-25 | Stop reason: SDUPTHER

## 2021-10-12 ENCOUNTER — OFFICE VISIT (OUTPATIENT)
Dept: FAMILY MEDICINE CLINIC | Age: 61
End: 2021-10-12
Payer: COMMERCIAL

## 2021-10-12 VITALS
HEIGHT: 63 IN | BODY MASS INDEX: 38.09 KG/M2 | DIASTOLIC BLOOD PRESSURE: 80 MMHG | HEART RATE: 86 BPM | WEIGHT: 215 LBS | SYSTOLIC BLOOD PRESSURE: 130 MMHG | OXYGEN SATURATION: 98 %

## 2021-10-12 DIAGNOSIS — E04.1 THYROID NODULE: ICD-10-CM

## 2021-10-12 DIAGNOSIS — E11.9 DIABETES MELLITUS TYPE 2, INSULIN DEPENDENT (HCC): ICD-10-CM

## 2021-10-12 DIAGNOSIS — Z79.4 DIABETES MELLITUS TYPE 2, INSULIN DEPENDENT (HCC): ICD-10-CM

## 2021-10-12 DIAGNOSIS — M54.16 LUMBAR RADICULOPATHY: ICD-10-CM

## 2021-10-12 DIAGNOSIS — I10 ESSENTIAL HYPERTENSION: ICD-10-CM

## 2021-10-12 DIAGNOSIS — Z12.31 ENCOUNTER FOR SCREENING MAMMOGRAM FOR MALIGNANT NEOPLASM OF BREAST: Primary | ICD-10-CM

## 2021-10-12 DIAGNOSIS — E11.65 UNCONTROLLED TYPE 2 DIABETES MELLITUS WITH HYPERGLYCEMIA (HCC): ICD-10-CM

## 2021-10-12 DIAGNOSIS — E89.0 POSTOPERATIVE HYPOTHYROIDISM: ICD-10-CM

## 2021-10-12 DIAGNOSIS — E78.2 MIXED HYPERLIPIDEMIA: ICD-10-CM

## 2021-10-12 DIAGNOSIS — M19.90 ARTHRITIS: ICD-10-CM

## 2021-10-12 PROCEDURE — 99214 OFFICE O/P EST MOD 30 MIN: CPT | Performed by: FAMILY MEDICINE

## 2021-10-12 RX ORDER — ATORVASTATIN CALCIUM 20 MG/1
TABLET, FILM COATED ORAL
Qty: 90 TABLET | Refills: 3 | Status: SHIPPED | OUTPATIENT
Start: 2021-10-12

## 2021-10-12 ASSESSMENT — ENCOUNTER SYMPTOMS
FACIAL SWELLING: 0
STRIDOR: 0
EYE ITCHING: 0
VOMITING: 0
SORE THROAT: 0
BACK PAIN: 0
EYE PAIN: 0
PHOTOPHOBIA: 0
APNEA: 0
COLOR CHANGE: 0
CHEST TIGHTNESS: 0
SINUS PRESSURE: 0
RECTAL PAIN: 0
ABDOMINAL PAIN: 0
EYE DISCHARGE: 0
WHEEZING: 0
NAUSEA: 0
ANAL BLEEDING: 0
SHORTNESS OF BREATH: 0
CONSTIPATION: 0
COUGH: 0
TROUBLE SWALLOWING: 0
DIARRHEA: 0
VOICE CHANGE: 0
EYE REDNESS: 0
ABDOMINAL DISTENTION: 0
RHINORRHEA: 0
BLOOD IN STOOL: 0
CHOKING: 0

## 2021-10-12 ASSESSMENT — PATIENT HEALTH QUESTIONNAIRE - PHQ9
SUM OF ALL RESPONSES TO PHQ9 QUESTIONS 1 & 2: 0
SUM OF ALL RESPONSES TO PHQ QUESTIONS 1-9: 0
SUM OF ALL RESPONSES TO PHQ QUESTIONS 1-9: 0
2. FEELING DOWN, DEPRESSED OR HOPELESS: 0
SUM OF ALL RESPONSES TO PHQ QUESTIONS 1-9: 0
1. LITTLE INTEREST OR PLEASURE IN DOING THINGS: 0

## 2021-10-12 NOTE — PROGRESS NOTES
Subjective:     Patient Jose Pelaez is a 62 y.o. female. HPI     Treatment Adherence:   Medication compliance:  compliant most of the time  Diet compliance:  compliant most of the time  Weight trend: decreasing  Current exercise: no regular exercise  Barriers: none    Diabetes Mellitus Type 2: Current symptoms/problems include none. Home blood sugar records: fasting range: 100  Any episodes of hypoglycemia? no  Daily Aspirin? Yes    Hypertension:  Home blood pressure monitoring: No.  She is adherent to a low sodium diet. Patient denies chest pain, shortness of breath, headache, lightheadedness, blurred vision, peripheral edema, palpitations, dry cough and fatigue. Antihypertensive medication side effects: no medication side effects noted. Use of agents associated with hypertension: none. Hyperlipidemia:  No new myalgias or GI upset on rosuvastatin (Crestor). Allergies   Allergen Reactions    Penicillins        Current Outpatient Medications   Medication Sig Dispense Refill    atorvastatin (LIPITOR) 20 MG tablet TAKE ONE TABLET BY MOUTH DAILY 90 tablet 3    insulin lispro (HUMALOG) 100 UNIT/ML injection vial INJECT UP  UNITS     UNDER THE SKIN DAILY VIA   INSULIN PUMP 90 mL 3    vitamin D (ERGOCALCIFEROL) 1.25 MG (59937 UT) CAPS capsule TAKE ONE CAPSULE BY MOUTH ONCE WEEKLY 12 capsule 3    liothyronine (CYTOMEL) 5 MCG tablet TAKE 1 TABLET DAILY 90 tablet 3    Insulin Infusion Pump DIPTI [employer] 770G insulin pump, Guardian Sensor 3 and transmitter 1 Device 0    losartan (COZAAR) 100 MG tablet TAKE ONE TABLET BY MOUTH DAILY 30 tablet 2    medical marijuana Take 1 each by mouth as needed.       TRULICITY 1.5 QJ/9.8RB SOPN INJECT 1.5 MG UNDER THE SKIN ONCE WEEKLY 4 pen 3    JARDIANCE 25 MG tablet TAKE ONE TABLET BY MOUTH DAILY 90 tablet 0    metFORMIN (GLUCOPHAGE) 1000 MG tablet TAKE 1 TABLET 2 TIMES DAILYWITH FOOD 180 tablet 2    tiZANidine (ZANAFLEX) 2 MG tablet Take 1 tablet by mouth 3 times daily as needed (spasm) 30 tablet 2    diclofenac sodium (VOLTAREN) 1 % GEL Apply topically 2 times daily 45 g 3    SYNTHROID 125 MCG tablet TAKE 1 TABLET DAILY 90 tablet 3    aspirin 81 MG tablet Take 81 mg by mouth daily      Continuous Blood Gluc  (DEXCOM G6 ) DIPTI As needed 1 Device 1    Continuous Blood Gluc Sensor (DEXCOM G6 SENSOR) MISC As needed 4 each 2    glucose blood VI test strips (ANAMARIA CONTOUR NEXT TEST) strip 1 each by In Vitro route 5 times daily As needed. 150 each 4     No current facility-administered medications for this visit. Past Medical History:   Diagnosis Date    Cystic acne     Diverticulitis large intestine 7/7/2010    Diverticulosis of colon     Episcleritis     HTN (hypertension) 12/6/2012    Hyperemesis gravidarum     Hyperlipidemia     Hyperthyroidism     Malignant neoplasm of anterior wall of urinary bladder ([facility] [address] 1/13/2017    Pregnancies     2/   2-vaginal deliveries    Thyroid follicular adenoma 54/0883    Hurtle Cell adenoma    Type II or unspecified type diabetes mellitus without mention of complication, not stated as uncontrolled     Vitamin D deficiency 11/2012       Past Surgical History:   Procedure Laterality Date    BLADDER SURGERY  12/01/2016    cancerous tumor removed from bladder    COLONOSCOPY  2012    polyps    OTHER SURGICAL HISTORY  2003    neuro surgery- CTS repair    THYROIDECTOMY  5/3489    UMBILICAL HERNIA REPAIR  5/5/16    with mesh        Social History     Socioeconomic History    Marital status:      Spouse name: Not on file    Number of children: 2    Years of education: Not on file    Highest education level: Not on file   Occupational History    Occupation: [employer]   Tobacco Use    Smoking status: Never Smoker    Smokeless tobacco: Never Used   Vaping Use    Vaping Use: Never used   Substance and Sexual Activity    Alcohol use:  Yes Alcohol/week: 1.0 standard drinks     Types: 1 Glasses of wine per week    Drug use: No    Sexual activity: Yes     Partners: Male   Other Topics Concern    Not on file   Social History Narrative    [place]    + seatbelts    Happily     Hobbies--none     Social Determinants of Health     Financial Resource Strain: Low Risk     Difficulty of Paying Living Expenses: Not hard at all   Food Insecurity: No Food Insecurity    Worried About Running Out of Food in the Last Year: Never true    Lucille of Food in the Last Year: Never true   Transportation Needs: No Transportation Needs    Lack of Transportation (Medical): No    Lack of Transportation (Non-Medical):  No   Physical Activity:     Days of Exercise per Week:     Minutes of Exercise per Session:    Stress:     Feeling of Stress :    Social Connections:     Frequency of Communication with Friends and Family:     Frequency of Social Gatherings with Friends and Family:     Attends Yarsanism Services:     Active Member of Clubs or Organizations:     Attends Club or Organization Meetings:     Marital Status:    Intimate Partner Violence:     Fear of Current or Ex-Partner:     Emotionally Abused:     Physically Abused:     Sexually Abused:        Family History   Problem Relation Age of Onset    Diabetes Mother     Alzheimer's Disease Mother     Heart Disease Father 45        several more       Immunization History   Administered Date(s) Administered    COVID-19, Rhodes Peter, PF, 30mcg/0.3mL 03/05/2021, 04/02/2021    Influenza Virus Vaccine 11/01/2014, 10/25/2019, 09/18/2020, 10/01/2021    Influenza Whole 11/01/2014    Influenza, MDCK Quadv, IM, PF (Flucelvax 2 yrs and older) 11/09/2017    Influenza, Quadv, IM, PF (6 mo and older Fluzone, Flulaval, Fluarix, and 3 yrs and older Afluria) 10/26/2018, 10/25/2019, 09/18/2020    Pneumococcal Polysaccharide ([id number]) 10/06/2016    Tdap (Boostrix, Adacel) 05/05/2004, 09/21/2016    Tetanus 05/05/2004       Review of Systems  Review of Systems   Constitutional: Negative for activity change, appetite change, chills, diaphoresis, fatigue, fever and unexpected weight change. HENT: Negative for congestion, dental problem, drooling, ear discharge, ear pain, facial swelling, hearing loss, mouth sores, nosebleeds, postnasal drip, rhinorrhea, sinus pressure, sneezing, sore throat, tinnitus, trouble swallowing and voice change. Eyes: Negative for photophobia, pain, discharge, redness, itching and visual disturbance. Respiratory: Negative for apnea, cough, choking, chest tightness, shortness of breath, wheezing and stridor. Cardiovascular: Negative for chest pain, palpitations and leg swelling. Gastrointestinal: Negative for abdominal distention, abdominal pain, anal bleeding, blood in stool, constipation, diarrhea, nausea, rectal pain and vomiting. Genitourinary: Negative for difficulty urinating, dysuria, enuresis, flank pain, frequency, genital sores and hematuria. Musculoskeletal: Negative for arthralgias, back pain, gait problem, joint swelling, myalgias, neck pain and neck stiffness. Skin: Negative for color change, pallor, rash and wound. Neurological: Negative for dizziness, tremors, seizures, syncope, facial asymmetry, speech difficulty, weakness, light-headedness, numbness and headaches. Hematological: Negative for adenopathy. Does not bruise/bleed easily. Psychiatric/Behavioral: Negative for agitation, behavioral problems, confusion, decreased concentration, dysphoric mood, hallucinations, self-injury, sleep disturbance and suicidal ideas. The patient is not nervous/anxious and is not hyperactive. Objective:   Physical Exam  Physical Exam  Vitals reviewed. Constitutional:       General: She is not in acute distress. Appearance: She is well-developed. Eyes:      Conjunctiva/sclera: Conjunctivae normal.      Pupils: Pupils are equal, round, and reactive to light. Neck:      Thyroid: No thyromegaly. Vascular: No JVD. Trachea: No tracheal deviation. Cardiovascular:      Rate and Rhythm: Normal rate and regular rhythm. Heart sounds: Normal heart sounds. No murmur heard. No gallop. Pulmonary:      Effort: Pulmonary effort is normal. No respiratory distress. Breath sounds: Normal breath sounds. No stridor. No wheezing or rales. Chest:      Chest wall: No tenderness. Abdominal:      General: Bowel sounds are normal. There is no distension. Palpations: Abdomen is soft. There is no mass. Tenderness: There is no abdominal tenderness. Musculoskeletal:         General: No tenderness. Lymphadenopathy:      Cervical: No cervical adenopathy. Skin:     General: Skin is warm and dry. Coloration: Skin is not pale. Findings: No erythema or rash. Neurological:      Mental Status: She is alert and oriented to person, place, and time. Cranial Nerves: No cranial nerve deficit. Motor: No abnormal muscle tone. Coordination: Coordination normal.      Deep Tendon Reflexes: Reflexes normal.   Psychiatric:         Behavior: Behavior normal.         Thought Content:  Thought content normal.         Judgment: Judgment normal.         Assessment and Plan:     Arthritis   Uncontrolled, changes made today: labs    Hypothyroidism   Uncontrolled, continue current medications    Essential hypertension   Uncontrolled, changes made today: labs-TOS/SE discussed--    Diabetes mellitus type 2, insulin dependent (HCC)  Better--labs       Mixed hyperlipidemia   Well-controlled, continue current medications--labs

## 2021-10-18 ENCOUNTER — HOSPITAL ENCOUNTER (OUTPATIENT)
Age: 61
Discharge: HOME | End: 2021-10-18
Payer: COMMERCIAL

## 2021-10-18 DIAGNOSIS — M54.16 LUMBAR RADICULOPATHY: ICD-10-CM

## 2021-10-18 DIAGNOSIS — E04.1 THYROID NODULE: ICD-10-CM

## 2021-10-18 DIAGNOSIS — I10 ESSENTIAL HYPERTENSION: ICD-10-CM

## 2021-10-18 DIAGNOSIS — E89.0 POSTOPERATIVE HYPOTHYROIDISM: ICD-10-CM

## 2021-10-18 DIAGNOSIS — E11.65 UNCONTROLLED TYPE 2 DIABETES MELLITUS WITH HYPERGLYCEMIA (HCC): ICD-10-CM

## 2021-10-18 LAB
A/G RATIO: 1.8 (ref 1.1–2.2)
ALBUMIN SERPL-MCNC: 4.7 G/DL (ref 3.4–5)
ALP BLD-CCNC: 77 U/L (ref 40–129)
ALT SERPL-CCNC: 15 U/L (ref 10–40)
ANION GAP SERPL CALCULATED.3IONS-SCNC: 13 MMOL/L (ref 3–16)
AST SERPL-CCNC: 11 U/L (ref 15–37)
BASOPHILS ABSOLUTE: 0 K/UL (ref 0–0.2)
BASOPHILS RELATIVE PERCENT: 0.2 %
BILIRUB SERPL-MCNC: 0.5 MG/DL (ref 0–1)
BUN BLDV-MCNC: 15 MG/DL (ref 7–20)
C-REACTIVE PROTEIN: <3 MG/L (ref 0–5.1)
CALCIUM SERPL-MCNC: 9.3 MG/DL (ref 8.3–10.6)
CHLORIDE BLD-SCNC: 104 MMOL/L (ref 99–110)
CHOLESTEROL, TOTAL: 141 MG/DL (ref 0–199)
CO2: 25 MMOL/L (ref 21–32)
CREAT SERPL-MCNC: 0.6 MG/DL (ref 0.6–1.1)
CREATININE URINE: 91.6 MG/DL (ref 28–259)
EOSINOPHILS ABSOLUTE: 0.1 K/UL (ref 0–0.6)
EOSINOPHILS RELATIVE PERCENT: 1.5 %
GFR AFRICAN AMERICAN: >60
GFR NON-AFRICAN AMERICAN: >60
GLOBULIN: 2.6 G/DL
GLUCOSE BLD-MCNC: 112 MG/DL (ref 70–99)
HCT VFR BLD CALC: 41.4 % (ref 36–48)
HDLC SERPL-MCNC: 33 MG/DL (ref 40–60)
HEMOGLOBIN: 13.4 G/DL (ref 12–16)
LDL CHOLESTEROL CALCULATED: 89 MG/DL
LYMPHOCYTES ABSOLUTE: 1.7 K/UL (ref 1–5.1)
LYMPHOCYTES RELATIVE PERCENT: 24.9 %
MCH RBC QN AUTO: 28 PG (ref 26–34)
MCHC RBC AUTO-ENTMCNC: 32.4 G/DL (ref 31–36)
MCV RBC AUTO: 86.3 FL (ref 80–100)
MICROALBUMIN UR-MCNC: <1.2 MG/DL
MICROALBUMIN/CREAT UR-RTO: NORMAL MG/G (ref 0–30)
MONOCYTES ABSOLUTE: 0.6 K/UL (ref 0–1.3)
MONOCYTES RELATIVE PERCENT: 8.9 %
NEUTROPHILS ABSOLUTE: 4.3 K/UL (ref 1.7–7.7)
NEUTROPHILS RELATIVE PERCENT: 64.5 %
PDW BLD-RTO: 14.6 % (ref 12.4–15.4)
PLATELET # BLD: 282 K/UL (ref 135–450)
PMV BLD AUTO: 8.2 FL (ref 5–10.5)
POTASSIUM SERPL-SCNC: 4.6 MMOL/L (ref 3.5–5.1)
RBC # BLD: 4.79 M/UL (ref 4–5.2)
SEDIMENTATION RATE, ERYTHROCYTE: 21 MM/HR (ref 0–30)
SODIUM BLD-SCNC: 142 MMOL/L (ref 136–145)
T4 FREE: 1.3 NG/DL (ref 0.9–1.8)
TOTAL PROTEIN: 7.3 G/DL (ref 6.4–8.2)
TRIGL SERPL-MCNC: 95 MG/DL (ref 0–150)
TSH SERPL DL<=0.05 MIU/L-ACNC: 1 UIU/ML (ref 0.27–4.2)
VLDLC SERPL CALC-MCNC: 19 MG/DL
WBC # BLD: 6.7 K/UL (ref 4–11)

## 2021-10-18 PROCEDURE — 80061 LIPID PANEL: CPT

## 2021-10-18 PROCEDURE — 85025 COMPLETE CBC W/AUTO DIFF WBC: CPT

## 2021-10-18 PROCEDURE — 84439 ASSAY OF FREE THYROXINE: CPT

## 2021-10-18 PROCEDURE — 82043 UR ALBUMIN QUANTITATIVE: CPT

## 2021-10-18 PROCEDURE — 82570 ASSAY OF URINE CREATININE: CPT

## 2021-10-18 PROCEDURE — 36415 COLL VENOUS BLD VENIPUNCTURE: CPT

## 2021-10-18 PROCEDURE — 85652 RBC SED RATE AUTOMATED: CPT

## 2021-10-18 PROCEDURE — 80053 COMPREHEN METABOLIC PANEL: CPT

## 2021-10-18 PROCEDURE — 84443 ASSAY THYROID STIM HORMONE: CPT

## 2021-10-18 PROCEDURE — 86140 C-REACTIVE PROTEIN: CPT

## 2021-11-29 RX ORDER — LOSARTAN POTASSIUM 100 MG/1
TABLET ORAL
Qty: 30 TABLET | Refills: 2 | Status: SHIPPED | OUTPATIENT
Start: 2021-11-29 | End: 2022-02-05

## 2021-11-29 NOTE — TELEPHONE ENCOUNTER
Medication:   Requested Prescriptions     Pending Prescriptions Disp Refills    losartan (COZAAR) 100 MG tablet [Pharmacy Med Name: LOSARTAN POTASSIUM 100 MG TAB] 30 tablet 2     Sig: TAKE ONE TABLET BY MOUTH DAILY       Last Filled:      Patient Phone Number: [phone] (home)     Last appt: 8/9/2021   Next appt: 11/30/2021    Last Labs DM:   Lab Results   Component Value Date    LABA1C 6.8 08/09/2021     Last Lipid:   Lab Results   Component Value Date    CHOL 141 10/18/2021    TRIG 95 10/18/2021    HDL 33 10/18/2021    HDL 34 03/09/2012    LDLCALC 89 10/18/2021     Last PSA: No results found for: PSA  Last Thyroid:   Lab Results   Component Value Date    TSH 1.00 10/18/2021    FT3 2.6 06/08/2018    M0JHCSE 0.91 08/22/2013    T4FREE 1.3 10/18/2021

## 2021-11-30 ENCOUNTER — OFFICE VISIT (OUTPATIENT)
Dept: ENDOCRINOLOGY | Age: 61
End: 2021-11-30
Payer: COMMERCIAL

## 2021-11-30 VITALS
WEIGHT: 218.8 LBS | HEIGHT: 63 IN | OXYGEN SATURATION: 98 % | BODY MASS INDEX: 38.77 KG/M2 | DIASTOLIC BLOOD PRESSURE: 79 MMHG | HEART RATE: 67 BPM | SYSTOLIC BLOOD PRESSURE: 142 MMHG

## 2021-11-30 DIAGNOSIS — E03.9 ACQUIRED HYPOTHYROIDISM: ICD-10-CM

## 2021-11-30 DIAGNOSIS — Z79.4 CONTROLLED TYPE 2 DIABETES MELLITUS WITHOUT COMPLICATION, WITH LONG-TERM CURRENT USE OF INSULIN (HCC): Primary | ICD-10-CM

## 2021-11-30 DIAGNOSIS — E11.9 CONTROLLED TYPE 2 DIABETES MELLITUS WITHOUT COMPLICATION, WITH LONG-TERM CURRENT USE OF INSULIN (HCC): Primary | ICD-10-CM

## 2021-11-30 DIAGNOSIS — Z96.41 INSULIN PUMP IN PLACE: ICD-10-CM

## 2021-11-30 LAB — HBA1C MFR BLD: 6.6 %

## 2021-11-30 PROCEDURE — 83036 HEMOGLOBIN GLYCOSYLATED A1C: CPT | Performed by: INTERNAL MEDICINE

## 2021-11-30 PROCEDURE — 99214 OFFICE O/P EST MOD 30 MIN: CPT | Performed by: INTERNAL MEDICINE

## 2021-11-30 NOTE — PROGRESS NOTES
Seen as  patient for diabetes      Interim:    Feels better  Glucose stable  She has [employer] sensor  Had some issues      Diagnosed with Type 2 diabetes mellitus in  2005  Known diabetic complications: none   Uncontrolled, moderate    Current diabetic medications     Metformin 1gm BID  jardiance  Trulicity    Insulin pump   MN   3.0  6 am 2.0  5:30 pm 2.65    I:C  5.5  CF 22  Target 110-120    Last A1c 6.6%<----- 6.8% < ---- 7%<----6.8%<---- 8.2%<----7.3%<-----   7.6%<--- 7.9 <--- -8.3    Moderate, controlled    Prior visit with dietician: no  Current diet: on average, 3 meals per day   Current exercise: walking   Current monitoring regimen: home blood tests - 2 times daily     Has brought blood glucose log/meter:yes  Home blood sugar records: 113-172  Any episodes of hypoglycemia?  No recent    No Hx of CAD , PVD, CVA    Hyperlipidemia:   For   Years  Takes lipitor 20mg  Controlled  LDL 76 on 3/19   LDL 78 on 10/20    Hypertension for years  Stable  Takes  Losartan 100mg    Last eye exam:2021  Last foot exam: 8/21  Last microalbumin to creatinine ratio:   10/21    She has a h/o hypothyroidism    H/o QUIJANO for hyperthyroidism    S/p Thyroidectomy for thyroid Nodule: had atypical cells on FNA and patient had surgery with Dr Filipe Bee in 2/2013, normal path, Hurtle cell adenoma;     TSH 2.68 on 3/19  TSH 1.84 on 9/20  TSH 1.0 on 10/21    Synthroid 125mcg cytomel 5mcg daily    She is taking Vit D 50,000 IU , weekly, needs level checked    Past Medical History:   Diagnosis Date    Cystic acne     Diverticulitis large intestine 7/7/2010    Diverticulosis of colon     Episcleritis     HTN (hypertension) 12/6/2012    Hyperemesis gravidarum     Hyperlipidemia     Hyperthyroidism     Malignant neoplasm of anterior wall of urinary bladder ([facility] [address] 1/13/2017    Pregnancies     2/   2-vaginal deliveries    Thyroid follicular adenoma 24/3642    Hurtle Cell adenoma    Type II or unspecified type diabetes mellitus without mention of complication, not stated as uncontrolled     Vitamin D deficiency 11/2012     Past Surgical History:   Procedure Laterality Date    BLADDER SURGERY  12/01/2016    cancerous tumor removed from bladder    COLONOSCOPY  2012    polyps    OTHER SURGICAL HISTORY  2003    neuro surgery- CTS repair    THYROIDECTOMY  2/7654    UMBILICAL HERNIA REPAIR  5/5/16    with mesh      Current Outpatient Medications   Medication Sig Dispense Refill    losartan (COZAAR) 100 MG tablet TAKE ONE TABLET BY MOUTH DAILY 30 tablet 2    atorvastatin (LIPITOR) 20 MG tablet TAKE ONE TABLET BY MOUTH DAILY 90 tablet 3    insulin lispro (HUMALOG) 100 UNIT/ML injection vial INJECT UP  UNITS     UNDER THE SKIN DAILY VIA   INSULIN PUMP 90 mL 3    vitamin D (ERGOCALCIFEROL) 1.25 MG (20765 UT) CAPS capsule TAKE ONE CAPSULE BY MOUTH ONCE WEEKLY 12 capsule 3    liothyronine (CYTOMEL) 5 MCG tablet TAKE 1 TABLET DAILY 90 tablet 3    Insulin Infusion Pump DIPTI [employer] 770G insulin pump, Guardian Sensor 3 and transmitter 1 Device 0    medical marijuana Take 1 each by mouth as needed.  TRULICITY 1.5 DM/3.2MU SOPN INJECT 1.5 MG UNDER THE SKIN ONCE WEEKLY 4 pen 3    JARDIANCE 25 MG tablet TAKE ONE TABLET BY MOUTH DAILY 90 tablet 0    metFORMIN (GLUCOPHAGE) 1000 MG tablet TAKE 1 TABLET 2 TIMES DAILYWITH FOOD 180 tablet 2    tiZANidine (ZANAFLEX) 2 MG tablet Take 1 tablet by mouth 3 times daily as needed (spasm) 30 tablet 2    diclofenac sodium (VOLTAREN) 1 % GEL Apply topically 2 times daily 45 g 3    SYNTHROID 125 MCG tablet TAKE 1 TABLET DAILY 90 tablet 3    aspirin 81 MG tablet Take 81 mg by mouth daily      Continuous Blood Gluc  (DEXCOM G6 ) DIPTI As needed 1 Device 1    Continuous Blood Gluc Sensor (DEXCOM G6 SENSOR) MISC As needed 4 each 2    glucose blood VI test strips (ANAMARIA CONTOUR NEXT TEST) strip 1 each by In Vitro route 5 times daily As needed.  150 each 4     No current facility-administered medications for this visit. Review of Systems  Scanned, reviewed    Vitals:    11/30/21 1526   BP: (!) 142/79   Pulse: 67   SpO2: 98%       Constitutional: Well-developed, appears stated age, cooperative, in no acute distress  H/E/N/M/T:atraumatic, normocephalic, external ears, nose, lips normal without lesions  Eyes: Lids, lashes, conjunctivae and sclerae normal, No proptosis, no redness  Neck: supple, symmetrical, no swelling  Skin: No obvious rashes or lesions present. Skin and hair texture normal  Psychiatric: Judgement and Insight:  judgement and insight appear normal  Neuro: Normal without focal findings, speech is normal normal, speech is spontaneous  Chest: No labored breathing, no chest deformity, no stridor  Musculoskeletal: No joint deformity, swelling  Foot exam:  Monofilament detected , no ulcers    Lab Reviewed   No components found for: CHLPL  Lab Results   Component Value Date    TRIG 95 10/18/2021    TRIG 139 10/19/2020    TRIG 142 03/22/2019     Lab Results   Component Value Date    HDL 33 (L) 10/18/2021    HDL 35 (L) 10/19/2020    HDL 37 (L) 03/22/2019     Lab Results   Component Value Date    LDLCALC 89 10/18/2021    LDLCALC 78 10/19/2020    LDLCALC 76 03/22/2019     Lab Results   Component Value Date    LABVLDL 19 10/18/2021    LABVLDL 28 10/19/2020    LABVLDL 28 03/22/2019     Lab Results   Component Value Date    LABA1C 6.8 08/09/2021       Assessment:     Higinio Duque is a 62 y.o. female with :    1.T2DM: Longstanding, fair control, on insulin pump and oral medication. Reviewed log, stable. Needs CHO restriction to 45 gram per meal.Great response to trulicity. No major fluctuation, glucose stable  2. HTN : Blood pressure slightly high  3. HLD: LDL at goal  4. Hypothyroidism: TSH at goal  5. Insulin pump in place: No failures  6. Vitamin D insufficiency:  Last level normal, on weekly dose now, She is taking  IU  7. Back pain:  She takes marijuana for it    Plan:     basal as:     MN  3.0  6 am 2.0  5:30 pm 2.65    I:C   5.5     Advise to check blood sugar 4 times a day   Patient to send blood sugar log for titration. Advise to exercise regularly. Advise to low simple carbohydrate and protein with each  meal diet. Diabetes Care: recommend yearly eye exam, foot exam and urine microalbumin to   creatinine ratio.

## 2021-12-06 RX ORDER — DULAGLUTIDE 1.5 MG/.5ML
INJECTION, SOLUTION SUBCUTANEOUS
Qty: 4 PEN | Refills: 3 | Status: SHIPPED | OUTPATIENT
Start: 2021-12-06 | End: 2022-02-28 | Stop reason: SDUPTHER

## 2021-12-06 NOTE — TELEPHONE ENCOUNTER
Medication:   Requested Prescriptions     Pending Prescriptions Disp Refills    TRULICITY 1.5 KP/2.8ZV SOPN [Pharmacy Med Name: Katy Ordonez 1.5 ZF/2.3 ML PEN] 4 pen 3     Sig: INJECT 1.5 MG UNDER THE SKIN ONCE WEEKLY       Last Filled:      Patient Phone Number: [phone] (home)     Last appt: 11/30/2021   Next appt: 2/28/2022    Last Labs DM:   Lab Results   Component Value Date    LABA1C 6.6 11/30/2021

## 2022-01-04 ENCOUNTER — OFFICE VISIT (OUTPATIENT)
Dept: FAMILY MEDICINE CLINIC | Age: 62
End: 2022-01-04
Payer: COMMERCIAL

## 2022-01-04 VITALS
HEIGHT: 63 IN | OXYGEN SATURATION: 98 % | SYSTOLIC BLOOD PRESSURE: 120 MMHG | DIASTOLIC BLOOD PRESSURE: 80 MMHG | WEIGHT: 213.8 LBS | HEART RATE: 94 BPM | BODY MASS INDEX: 37.88 KG/M2

## 2022-01-04 DIAGNOSIS — N30.90 CYSTITIS: Primary | ICD-10-CM

## 2022-01-04 DIAGNOSIS — C67.3 MALIGNANT NEOPLASM OF ANTERIOR WALL OF URINARY BLADDER (HCC): ICD-10-CM

## 2022-01-04 PROCEDURE — 99213 OFFICE O/P EST LOW 20 MIN: CPT | Performed by: FAMILY MEDICINE

## 2022-01-04 PROCEDURE — 81000 URINALYSIS NONAUTO W/SCOPE: CPT | Performed by: FAMILY MEDICINE

## 2022-01-04 RX ORDER — SULFAMETHOXAZOLE AND TRIMETHOPRIM 800; 160 MG/1; MG/1
TABLET ORAL
COMMUNITY
Start: 2021-12-21 | End: 2022-01-04 | Stop reason: ALTCHOICE

## 2022-01-04 SDOH — ECONOMIC STABILITY: FOOD INSECURITY: WITHIN THE PAST 12 MONTHS, YOU WORRIED THAT YOUR FOOD WOULD RUN OUT BEFORE YOU GOT MONEY TO BUY MORE.: NEVER TRUE

## 2022-01-04 SDOH — ECONOMIC STABILITY: FOOD INSECURITY: WITHIN THE PAST 12 MONTHS, THE FOOD YOU BOUGHT JUST DIDN'T LAST AND YOU DIDN'T HAVE MONEY TO GET MORE.: NEVER TRUE

## 2022-01-04 ASSESSMENT — ENCOUNTER SYMPTOMS
NAUSEA: 0
VOMITING: 0

## 2022-01-04 ASSESSMENT — SOCIAL DETERMINANTS OF HEALTH (SDOH): HOW HARD IS IT FOR YOU TO PAY FOR THE VERY BASICS LIKE FOOD, HOUSING, MEDICAL CARE, AND HEATING?: NOT HARD AT ALL

## 2022-01-04 NOTE — PROGRESS NOTES
Subjective:     Patient Yesy Coronado is a 62 y.o. female. Urinary Tract Infection   This is a new problem. The current episode started 1 to 4 weeks ago. The problem has been unchanged. The quality of the pain is described as burning. The pain is mild. Pertinent negatives include no chills, discharge, flank pain, frequency, hematuria, hesitancy, nausea, possible pregnancy, sweats, urgency or vomiting. Treatments tried: bactrim. The treatment provided moderate relief. There is no history of catheterization, kidney stones, recurrent UTIs, a single kidney, urinary stasis or a urological procedure. Allergies   Allergen Reactions    Penicillins     Tetracyclines & Related Other (See Comments)     Vertigo and red eyes    Sulfa Antibiotics Rash       Current Outpatient Medications   Medication Sig Dispense Refill    diclofenac sodium (VOLTAREN) 1 % GEL APPLY ONE GRAM TOPICALLY TWO TIMES A  g 3    TRULICITY 1.5 SK/3.1NJ SOPN INJECT 1.5 MG UNDER THE SKIN ONCE WEEKLY 4 pen 3    JARDIANCE 25 MG tablet TAKE ONE TABLET BY MOUTH DAILY 90 tablet 3    losartan (COZAAR) 100 MG tablet TAKE ONE TABLET BY MOUTH DAILY 30 tablet 2    atorvastatin (LIPITOR) 20 MG tablet TAKE ONE TABLET BY MOUTH DAILY 90 tablet 3    insulin lispro (HUMALOG) 100 UNIT/ML injection vial INJECT UP  UNITS     UNDER THE SKIN DAILY VIA   INSULIN PUMP 90 mL 3    vitamin D (ERGOCALCIFEROL) 1.25 MG (87487 UT) CAPS capsule TAKE ONE CAPSULE BY MOUTH ONCE WEEKLY 12 capsule 3    liothyronine (CYTOMEL) 5 MCG tablet TAKE 1 TABLET DAILY 90 tablet 3    Insulin Infusion Pump DIPTI Medtronic 770G insulin pump, Guardian Sensor 3 and transmitter 1 Device 0    medical marijuana Take 1 each by mouth as needed.       metFORMIN (GLUCOPHAGE) 1000 MG tablet TAKE 1 TABLET 2 TIMES DAILYWITH FOOD 180 tablet 2    tiZANidine (ZANAFLEX) 2 MG tablet Take 1 tablet by mouth 3 times daily as needed (spasm) 30 tablet 2    SYNTHROID 125 MCG tablet TAKE 1 TABLET DAILY 90 tablet 3    aspirin 81 MG tablet Take 81 mg by mouth daily      Continuous Blood Gluc  (DEXCOM G6 ) DIPTI As needed 1 Device 1    Continuous Blood Gluc Sensor (DEXCOM G6 SENSOR) MISC As needed 4 each 2    glucose blood VI test strips (ANAMARIA CONTOUR NEXT TEST) strip 1 each by In Vitro route 5 times daily As needed. 150 each 4     No current facility-administered medications for this visit. 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    Type II or unspecified type diabetes mellitus without mention of complication, not stated as uncontrolled     Vitamin D deficiency 11/2012       Past Surgical History:   Procedure Laterality Date    BLADDER SURGERY  12/01/2016    cancerous tumor removed from bladder    COLONOSCOPY  2012    polyps    OTHER SURGICAL HISTORY  2003    neuro surgery- CTS repair    THYROIDECTOMY  7/3297    UMBILICAL HERNIA REPAIR  5/5/16    with mesh        Social History     Socioeconomic History    Marital status:      Spouse name: Not on file    Number of children: 2    Years of education: Not on file    Highest education level: Not on file   Occupational History    Occupation: [employer]   Tobacco Use    Smoking status: Never Smoker    Smokeless tobacco: Never Used   Vaping Use    Vaping Use: Never used   Substance and Sexual Activity    Alcohol use:  Yes     Alcohol/week: 1.0 standard drink     Types: 1 Glasses of wine per week    Drug use: No    Sexual activity: Yes     Partners: Male   Other Topics Concern    Not on file Social History Narrative    Walker    + seatbelts    Happily     Hobbies--none     Social Determinants of Health     Financial Resource Strain: Low Risk     Difficulty of Paying Living Expenses: Not hard at all   Food Insecurity: No Food Insecurity    Worried About Running Out of Food in the Last Year: Never true    [address] in the Last Year: Never true   Transportation Needs:     Lack of Transportation (Medical): Not on file    Lack of Transportation (Non-Medical):  Not on file   Physical Activity:     Days of Exercise per Week: Not on file    Minutes of Exercise per Session: Not on file   Stress:     Feeling of Stress : Not on file   Social Connections:     Frequency of Communication with Friends and Family: Not on file    Frequency of Social Gatherings with Friends and Family: Not on file    Attends Taoism Services: Not on file    Active Member of [address] [place] [address] [employer] or Organizations: Not on file    Attends Club or Organization Meetings: Not on file    Marital Status: Not on file   Intimate Partner Violence:     Fear of Current or Ex-Partner: Not on file    Emotionally Abused: Not on file    Physically Abused: Not on file    Sexually Abused: Not on file   Housing Stability:     Unable to Pay for Housing in the Last Year: Not on file    Number of [place] in the Last Year: Not on file    Unstable Housing in the Last Year: Not on file       Family History   Problem Relation Age of Onset    Diabetes Mother     Alzheimer's Disease Mother     Heart Disease Father 45        several more       Immunization History   Administered Date(s) Administered    COVID-19, Rhodes Peter, PF, 30mcg/0.3mL 03/05/2021, 04/02/2021, 11/05/2021    Influenza Virus Vaccine 11/01/2014, 10/25/2019, 09/18/2020, 10/01/2021    Influenza Whole 11/01/2014    Influenza, MDCK Quadv, IM, PF (Flucelvax 2 yrs and older) 11/09/2017    Influenza, Quadv, IM, PF (6 mo and older Fluzone, Flulaval, Fluarix, and 3 yrs and older Cystitis  cx taken--will d/w urology     Malignant neoplasm of anterior wall of urinary bladder ([facility] [address]   Dx 5 y ago--will see Kahlil Berman

## 2022-01-05 LAB — URINE CULTURE, ROUTINE: NORMAL

## 2022-02-05 RX ORDER — LOSARTAN POTASSIUM 25 MG/1
TABLET ORAL
Qty: 90 TABLET | Refills: 3 | Status: SHIPPED | OUTPATIENT
Start: 2022-02-05 | End: 2022-02-28 | Stop reason: SDUPTHER

## 2022-02-28 ENCOUNTER — OFFICE VISIT (OUTPATIENT)
Dept: ENDOCRINOLOGY | Age: 62
End: 2022-02-28
Payer: COMMERCIAL

## 2022-02-28 VITALS
HEIGHT: 63 IN | HEART RATE: 80 BPM | SYSTOLIC BLOOD PRESSURE: 158 MMHG | BODY MASS INDEX: 39.12 KG/M2 | DIASTOLIC BLOOD PRESSURE: 76 MMHG | WEIGHT: 220.8 LBS | OXYGEN SATURATION: 98 %

## 2022-02-28 DIAGNOSIS — Z96.41 INSULIN PUMP IN PLACE: ICD-10-CM

## 2022-02-28 DIAGNOSIS — Z79.4 CONTROLLED TYPE 2 DIABETES MELLITUS WITHOUT COMPLICATION, WITH LONG-TERM CURRENT USE OF INSULIN (HCC): Primary | ICD-10-CM

## 2022-02-28 DIAGNOSIS — E11.9 CONTROLLED TYPE 2 DIABETES MELLITUS WITHOUT COMPLICATION, WITH LONG-TERM CURRENT USE OF INSULIN (HCC): Primary | ICD-10-CM

## 2022-02-28 PROCEDURE — 83036 HEMOGLOBIN GLYCOSYLATED A1C: CPT | Performed by: INTERNAL MEDICINE

## 2022-02-28 PROCEDURE — 99214 OFFICE O/P EST MOD 30 MIN: CPT | Performed by: INTERNAL MEDICINE

## 2022-02-28 RX ORDER — LOSARTAN POTASSIUM 100 MG/1
TABLET ORAL
Qty: 90 TABLET | Refills: 2 | Status: SHIPPED | OUTPATIENT
Start: 2022-02-28 | End: 2022-03-04

## 2022-02-28 RX ORDER — EMPAGLIFLOZIN 25 MG/1
TABLET, FILM COATED ORAL
Qty: 90 TABLET | Refills: 3 | Status: CANCELLED | OUTPATIENT
Start: 2022-02-28

## 2022-02-28 RX ORDER — DULAGLUTIDE 1.5 MG/.5ML
INJECTION, SOLUTION SUBCUTANEOUS
Qty: 13 PEN | Refills: 3 | Status: SHIPPED | OUTPATIENT
Start: 2022-02-28 | End: 2022-06-13

## 2022-02-28 NOTE — PROGRESS NOTES
Seen as  patient for diabetes      Interim:    Feels better  Glucose stable   No significant lows  +yeast infection  Takes losartan 100mg, request new prescription    She has Blackstrap sensor  Had some issues      Diagnosed with Type 2 diabetes mellitus in  2005  Known diabetic complications: none   Uncontrolled, moderate    Current diabetic medications     Metformin 1gm BID  jardiance  Trulicity    Insulin pump   MN   3.0  6 am 2.0  5:30 pm 2.65    I:C  5.5  CF 22  Target 110-120    Last A1c  6.4%<--- 6.6%<----- 6.8% < ---- 7%<----6.8%<---- 8.2%<----7.3%<-----   7.6%<--- 7.9 <--- -8.3    Moderate, controlled    Prior visit with dietician: no  Current diet: on average, 3 meals per day   Current exercise: walking   Current monitoring regimen: home blood tests - 2 times daily     Has brought blood glucose log/meter:yes  Home blood sugar records: 100s  Any episodes of hypoglycemia?  No recent    No Hx of CAD , PVD, CVA    Hyperlipidemia:   For   Years  Takes lipitor 20mg  Controlled  LDL 76 on 3/19   LDL 78 on 10/20    Hypertension for years  Stable  Takes  Losartan 100mg    Last eye exam:2021  Last foot exam: 8/21  Last microalbumin to creatinine ratio:   10/21    She has a h/o hypothyroidism    H/o QUIJANO for hyperthyroidism    S/p Thyroidectomy for thyroid Nodule: had atypical cells on FNA and patient had surgery with Dr Lexis Khan in 2/2013, normal path, Hurtle cell adenoma;     TSH 2.68 on 3/19  TSH 1.84 on 9/20  TSH 1.0 on 10/21    Synthroid 125mcg cytomel 5mcg daily    She is taking Vit D 50,000 IU , weekly, needs level checked    Past Medical History:   Diagnosis Date    Cystic acne     Diverticulitis large intestine 7/7/2010    Diverticulosis of colon     Episcleritis     HTN (hypertension) 12/6/2012    Hyperemesis gravidarum     Hyperlipidemia     Hyperthyroidism     Malignant neoplasm of anterior wall of urinary bladder ([facility] [address] 1/13/2017    Pregnancies     2/   2-vaginal deliveries    Thyroid follicular adenoma 12/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    Type II or unspecified type diabetes mellitus without mention of complication, not stated as uncontrolled     Vitamin D deficiency 11/2012     Past Surgical History:   Procedure Laterality Date    BLADDER SURGERY  12/01/2016    cancerous tumor removed from bladder    COLONOSCOPY  2012    polyps    OTHER SURGICAL HISTORY  2003    neuro surgery- CTS repair    THYROIDECTOMY  5/2740    UMBILICAL HERNIA REPAIR  5/5/16    with mesh      Current Outpatient Medications   Medication Sig Dispense Refill    losartan (COZAAR) 25 MG tablet TAKE 1 TABLET DAILY 90 tablet 3    diclofenac sodium (VOLTAREN) 1 % GEL APPLY ONE GRAM TOPICALLY TWO TIMES A  g 3    TRULICITY 1.5 KE/3.6YD SOPN INJECT 1.5 MG UNDER THE SKIN ONCE WEEKLY 4 pen 3    JARDIANCE 25 MG tablet TAKE ONE TABLET BY MOUTH DAILY 90 tablet 3    atorvastatin (LIPITOR) 20 MG tablet TAKE ONE TABLET BY MOUTH DAILY 90 tablet 3    insulin lispro (HUMALOG) 100 UNIT/ML injection vial INJECT UP  UNITS     UNDER THE SKIN DAILY VIA   INSULIN PUMP 90 mL 3    vitamin D (ERGOCALCIFEROL) 1.25 MG (52409 UT) CAPS capsule TAKE ONE CAPSULE BY MOUTH ONCE WEEKLY 12 capsule 3    liothyronine (CYTOMEL) 5 MCG tablet TAKE 1 TABLET DAILY 90 tablet 3    Insulin Infusion Pump DIPTI [employer] 770G insulin pump, Guardian Sensor 3 and transmitter 1 Device 0    medical marijuana Take 1 each by mouth as needed.       metFORMIN (GLUCOPHAGE) 1000 MG tablet TAKE 1 TABLET 2 TIMES DAILYWITH FOOD 180 tablet 2    tiZANidine (ZANAFLEX) 2 MG tablet Take 1 tablet by mouth 3 times daily as needed (spasm) 30 tablet 2    SYNTHROID 125 MCG tablet TAKE 1 TABLET DAILY 90 tablet 3    aspirin 81 MG tablet Take 81 mg by mouth daily      Continuous Blood Gluc  (DEXCOM G6 ) DIPTI As needed 1 Device 1    Continuous Blood Gluc Sensor (DEXCOM G6 SENSOR) MISC As needed 4 each 2    glucose blood VI test strips (ANAMARIA CONTOUR NEXT TEST) strip 1 each by In Vitro route 5 times daily As needed. 150 each 4     No current facility-administered medications for this visit. Review of Systems  Scanned, reviewed    Vitals:    02/28/22 1625   BP: (!) 158/76   Pulse: 80   SpO2: 98%       Constitutional: Well-developed, appears stated age, cooperative, in no acute distress  H/E/N/M/T:atraumatic, normocephalic, external ears, nose, lips normal without lesions  Eyes: Lids, lashes, conjunctivae and sclerae normal, No proptosis, no redness  Neck: supple, symmetrical, no swelling  Skin: No obvious rashes or lesions present. Skin and hair texture normal  Psychiatric: Judgement and Insight:  judgement and insight appear normal  Neuro: Normal without focal findings, speech is normal normal, speech is spontaneous  Chest: No labored breathing, no chest deformity, no stridor  Musculoskeletal: No joint deformity, swelling  Foot exam:  Monofilament detected , no ulcers    Lab Reviewed   No components found for: CHLPL  Lab Results   Component Value Date    TRIG 95 10/18/2021    TRIG 139 10/19/2020    TRIG 142 03/22/2019     Lab Results   Component Value Date    HDL 33 (L) 10/18/2021    HDL 35 (L) 10/19/2020    HDL 37 (L) 03/22/2019     Lab Results   Component Value Date    LDLCALC 89 10/18/2021    LDLCALC 78 10/19/2020    LDLCALC 76 03/22/2019     Lab Results   Component Value Date    LABVLDL 19 10/18/2021    LABVLDL 28 10/19/2020    LABVLDL 28 03/22/2019     Lab Results   Component Value Date    LABA1C 6.6 11/30/2021       Assessment:     Tabby Marinelli is a 62 y.o. female with :    1.T2DM: Longstanding, fair control, on insulin pump and oral medication. Reviewed log, stable. Needs CHO restriction to 45 gram per meal.Great response to trulicity. No major fluctuation, glucose stable  Stop jardiance due to yeast infection  2. HTN : Blood pressure slightly high  3. HLD: LDL at goal  4. Hypothyroidism: TSH at goal  5. Insulin pump in place: No failures  6. Vitamin D insufficiency:  Last level normal, on weekly dose now, She is taking  IU  7. Back pain:  She takes marijuana for it    Plan:     Stop jardiance  basal as:     MN  3.0  6 am 2.0  5:30 pm 2.65    I:C   5.5     Advise to check blood sugar 4 times a day   Patient to send blood sugar log for titration. Advise to exercise regularly. Advise to low simple carbohydrate and protein with each  meal diet. Diabetes Care: recommend yearly eye exam, foot exam and urine microalbumin to   creatinine ratio.

## 2022-03-04 RX ORDER — LOSARTAN POTASSIUM 100 MG/1
TABLET ORAL
Qty: 30 TABLET | Refills: 2 | Status: SHIPPED | OUTPATIENT
Start: 2022-03-04

## 2022-03-04 NOTE — TELEPHONE ENCOUNTER
Requested Prescriptions     Pending Prescriptions Disp Refills    losartan (COZAAR) 100 MG tablet [Pharmacy Med Name: LOSARTAN POTASSIUM 100 MG TAB] 30 tablet      Sig: TAKE ONE TABLET BY MOUTH DAILY         LOV 2/28/22  NOV 6/13/22    Last refill 2/28/22

## 2022-03-28 RX ORDER — DULAGLUTIDE 1.5 MG/.5ML
INJECTION, SOLUTION SUBCUTANEOUS
Qty: 2 ML | OUTPATIENT
Start: 2022-03-28

## 2022-05-13 RX ORDER — LEVOTHYROXINE SODIUM 125 MCG
TABLET ORAL
Qty: 90 TABLET | Refills: 3 | Status: SHIPPED | OUTPATIENT
Start: 2022-05-13

## 2022-06-13 ENCOUNTER — OFFICE VISIT (OUTPATIENT)
Dept: ENDOCRINOLOGY | Age: 62
End: 2022-06-13
Payer: COMMERCIAL

## 2022-06-13 VITALS
TEMPERATURE: 98 F | DIASTOLIC BLOOD PRESSURE: 84 MMHG | HEART RATE: 76 BPM | OXYGEN SATURATION: 98 % | SYSTOLIC BLOOD PRESSURE: 190 MMHG | HEIGHT: 63 IN | RESPIRATION RATE: 14 BRPM | BODY MASS INDEX: 39.69 KG/M2 | WEIGHT: 224 LBS

## 2022-06-13 DIAGNOSIS — Z96.41 INSULIN PUMP IN PLACE: ICD-10-CM

## 2022-06-13 DIAGNOSIS — E11.9 DIABETES MELLITUS TYPE 2, INSULIN DEPENDENT (HCC): Primary | ICD-10-CM

## 2022-06-13 DIAGNOSIS — Z79.4 DIABETES MELLITUS TYPE 2, INSULIN DEPENDENT (HCC): Primary | ICD-10-CM

## 2022-06-13 LAB — HBA1C MFR BLD: 7.1 %

## 2022-06-13 PROCEDURE — 99214 OFFICE O/P EST MOD 30 MIN: CPT | Performed by: INTERNAL MEDICINE

## 2022-06-13 PROCEDURE — 83036 HEMOGLOBIN GLYCOSYLATED A1C: CPT | Performed by: INTERNAL MEDICINE

## 2022-06-13 PROCEDURE — 3051F HG A1C>EQUAL 7.0%<8.0%: CPT | Performed by: INTERNAL MEDICINE

## 2022-06-13 RX ORDER — DULAGLUTIDE 3 MG/.5ML
3 INJECTION, SOLUTION SUBCUTANEOUS WEEKLY
Qty: 13 PEN | Refills: 2 | Status: SHIPPED | OUTPATIENT
Start: 2022-06-13 | End: 2022-09-20 | Stop reason: SDUPTHER

## 2022-06-13 NOTE — PROGRESS NOTES
Seen as  patient for diabetes      Interim:    Feels better  Glucose stable   No significant lows  Weight gain  Inquire about Mounjaro    She has [employer] sensor  Had some issues      Diagnosed with Type 2 diabetes mellitus in  2005  Known diabetic complications: none   Uncontrolled, moderate    Current diabetic medications     Metformin 1gm BID  jardiance  Off of it due to yeast infection  Trulicity    Insulin pump   MN   3.0  6 am 2.0  5:30 pm 2.65    I:C  5.5  CF 22  Target 110-120    Last A1c 7.1%<---- 6.4%<--- 6.6%<----- 6.8% < ---- 7%<----6.8%<---- 8.2%<----7.3%<-----   7.6%<--- 7.9 <--- -8.3    Moderate, controlled    Prior visit with dietician: no  Current diet: on average, 3 meals per day   Current exercise: walking   Current monitoring regimen: home blood tests - 1 times daily     Has brought blood glucose log/meter:yes  Home blood sugar records: 121-167  Any episodes of hypoglycemia?  No recent    No Hx of CAD , PVD, CVA    Hyperlipidemia:   For   Years  Takes lipitor 20mg  Controlled  LDL 76 on 3/19   LDL 78 on 10/20    Hypertension for years  Stable  Takes  Losartan 100mg    Last eye exam: 2021  Last foot exam: 8/21  Last microalbumin to creatinine ratio:   10/21    She has a h/o hypothyroidism    H/o QUIJANO for hyperthyroidism    S/p Thyroidectomy for thyroid Nodule: had atypical cells on FNA and patient had surgery with Dr Darylene Burnet in 2/2013, normal path, Hurtle cell adenoma;     TSH 2.68 on 3/19  TSH 1.84 on 9/20  TSH 1.0 on 10/21    Synthroid 125mcg cytomel 5mcg daily    She is taking Vit D 50,000 IU , weekly, needs level checked    Past Medical History:   Diagnosis Date    Cystic acne     Diverticulitis large intestine 7/7/2010    Diverticulosis of colon     Episcleritis     HTN (hypertension) 12/6/2012    Hyperemesis gravidarum     Hyperlipidemia     Hyperthyroidism     Malignant neoplasm of anterior wall of urinary bladder ([facility] [address] 1/13/2017    Pregnancies     2/   2-vaginal deliveries    Thyroid follicular adenoma 29/5485    Hurtle Cell adenoma    Type II or unspecified type diabetes mellitus without mention of complication, not stated as uncontrolled     Vitamin D deficiency 11/2012     Past Surgical History:   Procedure Laterality Date    BLADDER SURGERY  12/01/2016    cancerous tumor removed from bladder    COLONOSCOPY  2012    polyps    OTHER SURGICAL HISTORY  2003    neuro surgery- CTS repair    THYROIDECTOMY  6/1027    UMBILICAL HERNIA REPAIR  5/5/16    with mesh      Current Outpatient Medications   Medication Sig Dispense Refill    SYNTHROID 125 MCG tablet TAKE 1 TABLET DAILY 90 tablet 3    metFORMIN (GLUCOPHAGE) 1000 MG tablet TAKE 1 TABLET 2 TIMES DAILYWITH FOOD 180 tablet 2    losartan (COZAAR) 100 MG tablet TAKE ONE TABLET BY MOUTH DAILY 30 tablet 2    Dulaglutide (TRULICITY) 1.5 PY/9.0LY SOPN INJECT 1.5 MG UNDER THE SKIN ONCE WEEKLY 13 pen 3    diclofenac sodium (VOLTAREN) 1 % GEL APPLY ONE GRAM TOPICALLY TWO TIMES A  g 3    atorvastatin (LIPITOR) 20 MG tablet TAKE ONE TABLET BY MOUTH DAILY 90 tablet 3    insulin lispro (HUMALOG) 100 UNIT/ML injection vial INJECT UP  UNITS     UNDER THE SKIN DAILY VIA   INSULIN PUMP 90 mL 3    vitamin D (ERGOCALCIFEROL) 1.25 MG (53062 UT) CAPS capsule TAKE ONE CAPSULE BY MOUTH ONCE WEEKLY 12 capsule 3    liothyronine (CYTOMEL) 5 MCG tablet TAKE 1 TABLET DAILY 90 tablet 3    Insulin Infusion Pump DIPTI [employer] 770G insulin pump, Guardian Sensor 3 and transmitter 1 Device 0    medical marijuana Take 1 each by mouth as needed.  tiZANidine (ZANAFLEX) 2 MG tablet Take 1 tablet by mouth 3 times daily as needed (spasm) 30 tablet 2    aspirin 81 MG tablet Take 81 mg by mouth daily      glucose blood VI test strips (ANAMARIA CONTOUR NEXT TEST) strip 1 each by In Vitro route 5 times daily As needed.  150 each 4    Continuous Blood Gluc  ([address]) DIPTI As needed (Patient not taking: Reported on 6/13/2022) 1 Device 1    Continuous Blood Gluc Sensor (DEXCOM G6 SENSOR) MISC As needed (Patient not taking: Reported on 6/13/2022) 4 each 2     No current facility-administered medications for this visit. Review of Systems  Scanned, reviewed    Vitals:    06/13/22 1608   Resp: 14   Temp: 98 °F (36.7 °C)       Constitutional: Well-developed, appears stated age, cooperative, in no acute distress  H/E/N/M/T:atraumatic, normocephalic, external ears, nose, lips normal without lesions  Eyes: Lids, lashes, conjunctivae and sclerae normal, No proptosis, no redness  Neck: supple, symmetrical, no swelling  Skin: No obvious rashes or lesions present. Skin and hair texture normal  Psychiatric: Judgement and Insight:  judgement and insight appear normal  Neuro: Normal without focal findings, speech is normal normal, speech is spontaneous  Chest: No labored breathing, no chest deformity, no stridor  Musculoskeletal: No joint deformity, swelling  Foot exam:  Monofilament detected , no ulcers    Lab Reviewed   No components found for: CHLPL  Lab Results   Component Value Date    TRIG 95 10/18/2021    TRIG 139 10/19/2020    TRIG 142 03/22/2019     Lab Results   Component Value Date    HDL 33 (L) 10/18/2021    HDL 35 (L) 10/19/2020    HDL 37 (L) 03/22/2019     Lab Results   Component Value Date    LDLCALC 89 10/18/2021    LDLCALC 78 10/19/2020    LDLCALC 76 03/22/2019     Lab Results   Component Value Date    LABVLDL 19 10/18/2021    LABVLDL 28 10/19/2020    LABVLDL 28 03/22/2019     Lab Results   Component Value Date    LABA1C 6.6 11/30/2021       Assessment:     Lucita Montejo is a 62 y.o. female with :    1.T2DM: Longstanding, fair control, on insulin pump and oral medication. Reviewed log, stable. Needs CHO restriction to 45 gram per meal.Great response to trulicity. No major fluctuation, glucose stable  Increase trulicity dose  2. HTN : Blood pressure slightly high  3. HLD: LDL at goal  4. Hypothyroidism: TSH at goal  5. Insulin pump in place: No failures  6. Vitamin D insufficiency:  Last level normal, on weekly dose now, She is taking  IU  7. Back pain:  She takes marijuana for it    Plan:     basal as:     MN  3.0  6 am 2.0  5:30 pm 2.65    I:C   5.5     Advise to check blood sugar 4 times a day   Patient to send blood sugar log for titration. Advise to exercise regularly. Advise to low simple carbohydrate and protein with each  meal diet. Diabetes Care: recommend yearly eye exam, foot exam and urine microalbumin to   creatinine ratio.

## 2022-06-15 ENCOUNTER — HOSPITAL ENCOUNTER (OUTPATIENT)
Dept: MAMMOGRAPHY | Age: 62
Discharge: HOME | End: 2022-06-15
Payer: COMMERCIAL

## 2022-06-15 VITALS — BODY MASS INDEX: 38.09 KG/M2 | HEIGHT: 63 IN | WEIGHT: 215 LBS

## 2022-06-15 DIAGNOSIS — Z12.31 ENCOUNTER FOR SCREENING MAMMOGRAM FOR MALIGNANT NEOPLASM OF BREAST: ICD-10-CM

## 2022-06-15 PROCEDURE — 77063 BREAST TOMOSYNTHESIS BI: CPT

## 2022-07-14 ENCOUNTER — OFFICE VISIT (OUTPATIENT)
Dept: FAMILY MEDICINE CLINIC | Age: 62
End: 2022-07-14
Payer: COMMERCIAL

## 2022-07-14 VITALS
SYSTOLIC BLOOD PRESSURE: 138 MMHG | WEIGHT: 221 LBS | TEMPERATURE: 97 F | DIASTOLIC BLOOD PRESSURE: 80 MMHG | BODY MASS INDEX: 39.16 KG/M2 | OXYGEN SATURATION: 98 % | HEART RATE: 74 BPM | HEIGHT: 63 IN

## 2022-07-14 DIAGNOSIS — J01.90 ACUTE BACTERIAL SINUSITIS: Primary | ICD-10-CM

## 2022-07-14 DIAGNOSIS — B96.89 ACUTE BACTERIAL SINUSITIS: Primary | ICD-10-CM

## 2022-07-14 PROCEDURE — 99214 OFFICE O/P EST MOD 30 MIN: CPT | Performed by: NURSE PRACTITIONER

## 2022-07-14 RX ORDER — AZITHROMYCIN 250 MG/1
250 TABLET, FILM COATED ORAL SEE ADMIN INSTRUCTIONS
Qty: 6 TABLET | Refills: 0 | Status: SHIPPED | OUTPATIENT
Start: 2022-07-14 | End: 2022-07-19

## 2022-07-14 RX ORDER — DEXTROMETHORPHAN HYDROBROMIDE AND PROMETHAZINE HYDROCHLORIDE 15; 6.25 MG/5ML; MG/5ML
5 SYRUP ORAL 4 TIMES DAILY PRN
Qty: 240 ML | Refills: 0 | Status: SHIPPED | OUTPATIENT
Start: 2022-07-14 | End: 2022-07-21

## 2022-07-14 ASSESSMENT — ENCOUNTER SYMPTOMS
SORE THROAT: 1
SINUS PAIN: 0
BACK PAIN: 0
SWOLLEN GLANDS: 0
ABDOMINAL PAIN: 0
CONSTIPATION: 0
SINUS COMPLAINT: 1
RHINORRHEA: 1
DIARRHEA: 0
WHEEZING: 0
COLOR CHANGE: 0
SHORTNESS OF BREATH: 0
COUGH: 1
SINUS PRESSURE: 1

## 2022-07-14 ASSESSMENT — PATIENT HEALTH QUESTIONNAIRE - PHQ9
7. TROUBLE CONCENTRATING ON THINGS, SUCH AS READING THE NEWSPAPER OR WATCHING TELEVISION: 0
4. FEELING TIRED OR HAVING LITTLE ENERGY: 2
SUM OF ALL RESPONSES TO PHQ QUESTIONS 1-9: 7
3. TROUBLE FALLING OR STAYING ASLEEP: 1
SUM OF ALL RESPONSES TO PHQ QUESTIONS 1-9: 7
8. MOVING OR SPEAKING SO SLOWLY THAT OTHER PEOPLE COULD HAVE NOTICED. OR THE OPPOSITE, BEING SO FIGETY OR RESTLESS THAT YOU HAVE BEEN MOVING AROUND A LOT MORE THAN USUAL: 0
10. IF YOU CHECKED OFF ANY PROBLEMS, HOW DIFFICULT HAVE THESE PROBLEMS MADE IT FOR YOU TO DO YOUR WORK, TAKE CARE OF THINGS AT HOME, OR GET ALONG WITH OTHER PEOPLE: 1
2. FEELING DOWN, DEPRESSED OR HOPELESS: 0
6. FEELING BAD ABOUT YOURSELF - OR THAT YOU ARE A FAILURE OR HAVE LET YOURSELF OR YOUR FAMILY DOWN: 0
1. LITTLE INTEREST OR PLEASURE IN DOING THINGS: 2
SUM OF ALL RESPONSES TO PHQ9 QUESTIONS 1 & 2: 2
9. THOUGHTS THAT YOU WOULD BE BETTER OFF DEAD, OR OF HURTING YOURSELF: 0
SUM OF ALL RESPONSES TO PHQ QUESTIONS 1-9: 7
SUM OF ALL RESPONSES TO PHQ QUESTIONS 1-9: 7
5. POOR APPETITE OR OVEREATING: 2

## 2022-07-14 NOTE — ASSESSMENT & PLAN NOTE
Ongoing x 11 days without improvement   Start Z-Randell  Continue OTC medications for symptom relief  Phenergan DM for cough  Call if no better in 3 to 4 days

## 2022-07-14 NOTE — PROGRESS NOTES
Maine Arvizu (:  1960) is a 62 y.o. female,Established patient, here for evaluation of the following chief complaint(s):  Sinus Problem (Has been present since 7/3/22, NEG Covid, sore throat, drainage, coughing, pressure in ears, has been taking Flonase and Mucinex DM.)      ASSESSMENT/PLAN:  1. Acute bacterial sinusitis  Assessment & Plan:  Ongoing x 11 days without improvement   Start Z-Randell  Continue OTC medications for symptom relief  Phenergan DM for cough  Call if no better in 3 to 4 days  Orders:  -     azithromycin (ZITHROMAX) 250 MG tablet; Take 1 tablet by mouth See Admin Instructions for 5 days 500mg on day 1 followed by 250mg on days 2 - 5, Disp-6 tablet, R-0Normal      No follow-ups on file. SUBJECTIVE/OBJECTIVE:  Sinus Problem  This is a new problem. The current episode started 1 to 4 weeks ago. The problem is unchanged. There has been no fever. Associated symptoms include congestion, coughing, sinus pressure, sneezing and a sore throat. Pertinent negatives include no chills, ear pain, headaches, shortness of breath or swollen glands. Past treatments include oral decongestants (Flonase).  The treatment provided no relief.   negative home Covid test 5 days ago     Current Outpatient Medications   Medication Sig Dispense Refill    azithromycin (ZITHROMAX) 250 MG tablet Take 1 tablet by mouth See Admin Instructions for 5 days 500mg on day 1 followed by 250mg on days 2 - 5 6 tablet 0    promethazine-dextromethorphan (PROMETHAZINE-DM) 6.25-15 MG/5ML syrup Take 5 mLs by mouth 4 times daily as needed for Cough 240 mL 0    Dulaglutide (TRULICITY) 3 YY/8.6GL SOPN Inject 3 mg into the skin once a week 13 pen 2    SYNTHROID 125 MCG tablet TAKE 1 TABLET DAILY 90 tablet 3    metFORMIN (GLUCOPHAGE) 1000 MG tablet TAKE 1 TABLET 2 TIMES DAILYWITH FOOD 180 tablet 2    losartan (COZAAR) 100 MG tablet TAKE ONE TABLET BY MOUTH DAILY 30 tablet 2    diclofenac sodium (VOLTAREN) 1 % GEL APPLY ONE GRAM TOPICALLY TWO TIMES A  g 3    atorvastatin (LIPITOR) 20 MG tablet TAKE ONE TABLET BY MOUTH DAILY 90 tablet 3    insulin lispro (HUMALOG) 100 UNIT/ML injection vial INJECT UP  UNITS     UNDER THE SKIN DAILY VIA   INSULIN PUMP 90 mL 3    vitamin D (ERGOCALCIFEROL) 1.25 MG (61304 UT) CAPS capsule TAKE ONE CAPSULE BY MOUTH ONCE WEEKLY 12 capsule 3    liothyronine (CYTOMEL) 5 MCG tablet TAKE 1 TABLET DAILY 90 tablet 3    Insulin Infusion Pump DIPTI Medtronic 770G insulin pump, Guardian Sensor 3 and transmitter 1 Device 0    medical marijuana Take 1 each by mouth as needed.  tiZANidine (ZANAFLEX) 2 MG tablet Take 1 tablet by mouth 3 times daily as needed (spasm) 30 tablet 2    aspirin 81 MG tablet Take 81 mg by mouth daily      glucose blood VI test strips ([employer] CONTOUR NEXT TEST) strip 1 each by In Vitro route 5 times daily As needed. 150 each 4    Continuous Blood Gluc  ([address]) DIPTI As needed (Patient not taking: Reported on 6/13/2022) 1 Device 1    Continuous Blood Gluc Sensor (DEXCOM G6 SENSOR) MISC As needed (Patient not taking: Reported on 6/13/2022) 4 each 2     No current facility-administered medications for this visit. Review of Systems   Constitutional: Negative for chills, fatigue and fever. HENT: Positive for congestion, rhinorrhea, sinus pressure, sneezing and sore throat. Negative for ear discharge, ear pain and sinus pain. Respiratory: Positive for cough. Negative for shortness of breath and wheezing. Cardiovascular: Negative for chest pain and palpitations. Gastrointestinal: Negative for abdominal pain, constipation and diarrhea. Musculoskeletal: Negative for arthralgias, back pain and myalgias. Skin: Negative for color change, pallor and rash. Neurological: Negative for dizziness, syncope, weakness, light-headedness and headaches. Psychiatric/Behavioral: Negative for behavioral problems, confusion and sleep disturbance.  The patient is not nervous/anxious. Vitals:    07/14/22 1259   BP: 138/80   Site: Right Upper Arm   Position: Sitting   Cuff Size: Large Adult   Pulse: 74   Temp: 97 °F (36.1 °C)   SpO2: 98%   Weight: 221 lb (100.2 kg)   Height: 5' 3\" (1.6 m)       Physical Exam  Constitutional:       Appearance: Normal appearance. HENT:      Head: Normocephalic and atraumatic. Right Ear: Tympanic membrane normal.      Left Ear: Tympanic membrane normal.      Nose: Congestion and rhinorrhea present. Right Sinus: Maxillary sinus tenderness present. Left Sinus: Maxillary sinus tenderness present. Mouth/Throat:      Mouth: Mucous membranes are moist.   Eyes:      Extraocular Movements: Extraocular movements intact. Conjunctiva/sclera: Conjunctivae normal.   Pulmonary:      Effort: Pulmonary effort is normal.   Musculoskeletal:      Cervical back: Normal range of motion. Skin:     Coloration: Skin is not jaundiced or pale. Neurological:      General: No focal deficit present. Mental Status: She is alert and oriented to person, place, and time. Psychiatric:         Mood and Affect: Mood normal.         Behavior: Behavior normal.         Thought Content: Thought content normal.                 An electronic signature was used to authenticate this note.     --Karis Santiago, APRN - CNP

## 2022-08-16 RX ORDER — LIOTHYRONINE SODIUM 5 UG/1
TABLET ORAL
Qty: 90 TABLET | Refills: 3 | Status: SHIPPED | OUTPATIENT
Start: 2022-08-16

## 2022-08-25 RX ORDER — ERGOCALCIFEROL 1.25 MG/1
CAPSULE ORAL
Qty: 12 CAPSULE | Refills: 3 | Status: SHIPPED | OUTPATIENT
Start: 2022-08-25

## 2022-09-07 RX ORDER — INSULIN LISPRO 100 [IU]/ML
INJECTION, SOLUTION INTRAVENOUS; SUBCUTANEOUS
Qty: 90 ML | Refills: 3 | Status: SHIPPED | OUTPATIENT
Start: 2022-09-07

## 2022-09-07 NOTE — TELEPHONE ENCOUNTER
Medication:   Requested Prescriptions     Pending Prescriptions Disp Refills    insulin lispro (HUMALOG) 100 UNIT/ML SOLN injection vial [Pharmacy Med Name: Roxana Braun 100U/ML] 90 mL 3     Sig: INJECT UP  UNITS     UNDER THE SKIN DAILY VIA   INSULIN PUMP       Last Filled:      Patient Phone Number: [phone] (home)     Last appt: 11/30/2021   Next appt: 09/20/2022    Last Labs DM:   Lab Results   Component Value Date/Time    LABA1C 7.1 06/13/2022 04:22 PM

## 2022-09-09 ENCOUNTER — TELEPHONE (OUTPATIENT)
Dept: ENDOCRINOLOGY | Age: 62
End: 2022-09-09

## 2022-09-09 NOTE — TELEPHONE ENCOUNTER
Re: Infusion sets and resevoirs     Heber at [employer] is calling about the above equipment. She sent a request via email to Ginger on 9/6/2022. Heber's call back number is [phone]    She states she sent a request twice, the patient does not have supplies. She needs a signed RX from Dr. Sher Miller.

## 2022-09-09 NOTE — TELEPHONE ENCOUNTER
Reached out to [place] with [employer], I have not received any emails from a telena they do not have my email, Houston is going to see what is needed and get back to me.

## 2022-09-14 ENCOUNTER — TELEPHONE (OUTPATIENT)
Dept: FAMILY MEDICINE CLINIC | Age: 62
End: 2022-09-14

## 2022-09-14 NOTE — TELEPHONE ENCOUNTER
Pt started passing blood clots in urine this morning. Pt was wondering if she should contact her urologist directly.

## 2022-09-15 ENCOUNTER — OFFICE VISIT (OUTPATIENT)
Dept: FAMILY MEDICINE CLINIC | Age: 62
End: 2022-09-15
Payer: COMMERCIAL

## 2022-09-15 VITALS
WEIGHT: 219 LBS | DIASTOLIC BLOOD PRESSURE: 84 MMHG | SYSTOLIC BLOOD PRESSURE: 136 MMHG | TEMPERATURE: 97.9 F | OXYGEN SATURATION: 98 % | HEIGHT: 63 IN | HEART RATE: 72 BPM | BODY MASS INDEX: 38.8 KG/M2

## 2022-09-15 DIAGNOSIS — C67.3 MALIGNANT NEOPLASM OF ANTERIOR WALL OF URINARY BLADDER (HCC): ICD-10-CM

## 2022-09-15 DIAGNOSIS — N30.01 ACUTE CYSTITIS WITH HEMATURIA: Primary | ICD-10-CM

## 2022-09-15 LAB
BILIRUBIN, POC: NORMAL
BLOOD URINE, POC: NORMAL
CLARITY, POC: NORMAL
COLOR, POC: NORMAL
GLUCOSE URINE, POC: NORMAL
KETONES, POC: NORMAL
LEUKOCYTE EST, POC: NORMAL
NITRITE, POC: NORMAL
PH, POC: 5
PROTEIN, POC: NORMAL
SPECIFIC GRAVITY, POC: >=1.03
UROBILINOGEN, POC: 0.2

## 2022-09-15 PROCEDURE — 90674 CCIIV4 VAC NO PRSV 0.5 ML IM: CPT | Performed by: NURSE PRACTITIONER

## 2022-09-15 PROCEDURE — 81002 URINALYSIS NONAUTO W/O SCOPE: CPT | Performed by: NURSE PRACTITIONER

## 2022-09-15 PROCEDURE — 99213 OFFICE O/P EST LOW 20 MIN: CPT | Performed by: NURSE PRACTITIONER

## 2022-09-15 PROCEDURE — 90471 IMMUNIZATION ADMIN: CPT | Performed by: NURSE PRACTITIONER

## 2022-09-15 RX ORDER — CEPHALEXIN 500 MG/1
500 CAPSULE ORAL 2 TIMES DAILY
Qty: 20 CAPSULE | Refills: 0 | Status: SHIPPED | OUTPATIENT
Start: 2022-09-15

## 2022-09-15 NOTE — PROGRESS NOTES
Terence Solorzano (:  1960) is a 62 y.o. female,Established patient, here for evaluation of the following chief complaint(s):  Dysuria (Blood in urine)      ASSESSMENT/PLAN:  1. Acute cystitis with hematuria  -     JACKIE Oakes MD, Urology, [facility]  -     Culture, Urine  -     POCT Urinalysis no Micro  2. Malignant neoplasm of anterior wall of urinary bladder (HCC)  -     JACKIE Oakes MD, Urology, [facility]      Return if symptoms worsen or fail to improve. SUBJECTIVE/OBJECTIVE:  Gross hematuria with dysuria yesterday  Feels clear and better today  No fever, back or abd pain  H/o bladder ca    Current Outpatient Medications   Medication Sig Dispense Refill    cephALEXin (KEFLEX) 500 MG capsule Take 1 capsule by mouth 2 times daily 20 capsule 0    insulin lispro (HUMALOG) 100 UNIT/ML SOLN injection vial INJECT UP  UNITS     UNDER THE SKIN DAILY VIA   INSULIN PUMP 90 mL 3    vitamin D (ERGOCALCIFEROL) 1.25 MG (44601 UT) CAPS capsule TAKE ONE CAPSULE BY MOUTH ONCE WEEKLY 12 capsule 3    liothyronine (CYTOMEL) 5 MCG tablet TAKE 1 TABLET DAILY 90 tablet 3    Dulaglutide (TRULICITY) 3 KA/8.0IN SOPN Inject 3 mg into the skin once a week 13 pen 2    SYNTHROID 125 MCG tablet TAKE 1 TABLET DAILY 90 tablet 3    metFORMIN (GLUCOPHAGE) 1000 MG tablet TAKE 1 TABLET 2 TIMES DAILYWITH FOOD 180 tablet 2    losartan (COZAAR) 100 MG tablet TAKE ONE TABLET BY MOUTH DAILY 30 tablet 2    diclofenac sodium (VOLTAREN) 1 % GEL APPLY ONE GRAM TOPICALLY TWO TIMES A  g 3    atorvastatin (LIPITOR) 20 MG tablet TAKE ONE TABLET BY MOUTH DAILY 90 tablet 3    insulin lispro (HUMALOG) 100 UNIT/ML injection vial INJECT UP  UNITS     UNDER THE SKIN DAILY VIA   INSULIN PUMP 90 mL 3    Insulin Infusion Pump DIPTI Medtronic 770G insulin pump, Guardian Sensor 3 and transmitter 1 Device 0    medical marijuana Take 1 each by mouth as needed.       tiZANidine (ZANAFLEX) 2 MG tablet Take 1 tablet by mouth 3 times daily as needed (spasm) 30 tablet 2    aspirin 81 MG tablet Take 81 mg by mouth daily      glucose blood VI test strips ([employer] CONTOUR NEXT TEST) strip 1 each by In Vitro route 5 times daily As needed. 150 each 4    Continuous Blood Gluc  ([address]) DIPTI As needed (Patient not taking: No sig reported) 1 Device 1    Continuous Blood Gluc Sensor (DEXCOM G6 SENSOR) MISC As needed (Patient not taking: No sig reported) 4 each 2     No current facility-administered medications for this visit. Review of Systems   Genitourinary:  Positive for hematuria. Vitals:    09/15/22 0934   BP: 136/84   Site: Right Upper Arm   Position: Sitting   Cuff Size: Large Adult   Pulse: 72   Temp: 97.9 °F (36.6 °C)   SpO2: 98%   Weight: 219 lb (99.3 kg)   Height: 5' 3\" (1.6 m)       Physical Exam  Constitutional:       Appearance: Normal appearance. She is well-developed. She is obese. HENT:      Head: Normocephalic. Right Ear: Tympanic membrane normal.      Left Ear: Tympanic membrane normal.      Mouth/Throat:      Mouth: Mucous membranes are moist.   Eyes:      Pupils: Pupils are equal, round, and reactive to light. Cardiovascular:      Rate and Rhythm: Normal rate. Pulmonary:      Effort: Pulmonary effort is normal.   Musculoskeletal:         General: Normal range of motion. Cervical back: Normal range of motion. Skin:     General: Skin is warm and dry. Neurological:      Mental Status: She is alert and oriented to person, place, and time. An electronic signature was used to authenticate this note.     --AMNA Kennedy - CNP

## 2022-09-18 LAB
ORGANISM: ABNORMAL
URINE CULTURE, ROUTINE: ABNORMAL

## 2022-09-20 ENCOUNTER — TELEPHONE (OUTPATIENT)
Dept: ENDOCRINOLOGY | Age: 62
End: 2022-09-20

## 2022-09-20 ENCOUNTER — OFFICE VISIT (OUTPATIENT)
Dept: ENDOCRINOLOGY | Age: 62
End: 2022-09-20
Payer: COMMERCIAL

## 2022-09-20 VITALS
WEIGHT: 220.4 LBS | OXYGEN SATURATION: 99 % | SYSTOLIC BLOOD PRESSURE: 159 MMHG | BODY MASS INDEX: 39.05 KG/M2 | HEIGHT: 63 IN | DIASTOLIC BLOOD PRESSURE: 80 MMHG | HEART RATE: 70 BPM

## 2022-09-20 DIAGNOSIS — Z79.4 DIABETES MELLITUS TYPE 2, INSULIN DEPENDENT (HCC): Primary | ICD-10-CM

## 2022-09-20 DIAGNOSIS — E11.9 DIABETES MELLITUS TYPE 2, INSULIN DEPENDENT (HCC): Primary | ICD-10-CM

## 2022-09-20 LAB — HBA1C MFR BLD: 7 %

## 2022-09-20 PROCEDURE — 99214 OFFICE O/P EST MOD 30 MIN: CPT | Performed by: INTERNAL MEDICINE

## 2022-09-20 PROCEDURE — 83036 HEMOGLOBIN GLYCOSYLATED A1C: CPT | Performed by: INTERNAL MEDICINE

## 2022-09-20 PROCEDURE — 3051F HG A1C>EQUAL 7.0%<8.0%: CPT | Performed by: INTERNAL MEDICINE

## 2022-09-20 RX ORDER — DULAGLUTIDE 3 MG/.5ML
3 INJECTION, SOLUTION SUBCUTANEOUS WEEKLY
Qty: 13 ADJUSTABLE DOSE PRE-FILLED PEN SYRINGE | Refills: 3 | Status: SHIPPED | OUTPATIENT
Start: 2022-09-20

## 2022-09-20 NOTE — PROGRESS NOTES
Seen as  patient for diabetes      Interim:    Feels better  Glucose stable     She has [employer] sensor  Had some issues      Diagnosed with Type 2 diabetes mellitus in  2005  Known diabetic complications: none   Uncontrolled, moderate    Current diabetic medications     Metformin 1gm BID  jardiance  Off of it due to yeast infection  Trulicity 3mg    Insulin pump   MN   3.0  6 am 2.0  5:30 pm 2.65    I:C  5.5  CF 22  Target 110-120    Last A1c 7%<-----7.1%<---- 6.4%<--- 6.6%<----- 6.8% < ---- 7%<----6.8%<---- 8.2%<----7.3%<-----   7.6%<--- 7.9 <--- -8.3    Moderate, controlled    Prior visit with dietician: no  Current diet: on average, 3 meals per day   Current exercise: walking   Current monitoring regimen: home blood tests - 1 times daily     Has brought blood glucose log/meter:yes  Home blood sugar records: 128-263  Any episodes of hypoglycemia?  No recent    No Hx of CAD , PVD, CVA    Hyperlipidemia:   For   Years  Takes lipitor 20mg  Controlled  LDL 76 on 3/19   LDL 78 on 10/20    Hypertension for years  Stable  Takes  Losartan 100mg    Last eye exam: 2021  Last foot exam: 8/21  Last microalbumin to creatinine ratio:   10/21    She has a h/o hypothyroidism    H/o QUIJANO for hyperthyroidism    S/p Thyroidectomy for thyroid Nodule: had atypical cells on FNA and patient had surgery with Dr Eugene Rendon in 2/2013, normal path, Hurtle cell adenoma;     TSH 2.68 on 3/19  TSH 1.84 on 9/20  TSH 1.0 on 10/21    Synthroid 125mcg cytomel 5mcg daily    She is taking Vit D 50,000 IU , weekly, needs level checked    Past Medical History:   Diagnosis Date    Cystic acne     Diverticulitis large intestine 7/7/2010    Diverticulosis of colon     Episcleritis     HTN (hypertension) 12/6/2012    Hyperemesis gravidarum     Hyperlipidemia     Hyperthyroidism     Malignant neoplasm of anterior wall of urinary bladder ([facility] [address] 1/13/2017    Pregnancies     2/   2-vaginal deliveries    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 150 each 4     No current facility-administered medications for this visit. Review of Systems  Scanned, reviewed        Constitutional: Well-developed, appears stated age, cooperative, in no acute distress  H/E/N/M/T:atraumatic, normocephalic, external ears, nose, lips normal without lesions  Eyes: Lids, lashes, conjunctivae and sclerae normal, No proptosis, no redness  Neck: supple, symmetrical, no swelling  Skin: No obvious rashes or lesions present. Skin and hair texture normal  Psychiatric: Judgement and Insight:  judgement and insight appear normal  Neuro: Normal without focal findings, speech is normal normal, speech is spontaneous  Chest: No labored breathing, no chest deformity, no stridor  Musculoskeletal: No joint deformity, swelling  Foot exam:  Monofilament detected , no ulcers    Lab Reviewed   No components found for: CHLPL  Lab Results   Component Value Date    TRIG 95 10/18/2021    TRIG 139 10/19/2020    TRIG 142 03/22/2019     Lab Results   Component Value Date    HDL 33 (L) 10/18/2021    HDL 35 (L) 10/19/2020    HDL 37 (L) 03/22/2019     Lab Results   Component Value Date    LDLCALC 89 10/18/2021    LDLCALC 78 10/19/2020    LDLCALC 76 03/22/2019     Lab Results   Component Value Date    LABVLDL 19 10/18/2021    LABVLDL 28 10/19/2020    LABVLDL 28 03/22/2019     Lab Results   Component Value Date    LABA1C 7.1 06/13/2022       Assessment:     Javier Mendes is a 62 y.o. female with :    1.T2DM: Longstanding, fair control, on insulin pump and oral medication. Reviewed log, stable. Needs CHO restriction to 45 gram per meal as eating 80 gram at times. Great response to trulicity. No major fluctuation, glucose stable  Fasting high at times, change basal  2. HTN : Blood pressure slightly high  3. HLD: LDL at goal  4. Hypothyroidism: TSH at goal  5. Insulin pump in place: No failures  6. Vitamin D insufficiency:  Last level normal, on weekly dose now, She is taking  IU  7. Back pain:  She takes marijuana for it    Plan:     basal as:     MN  3.0----> 3.2  6 am 2.0  5:30 pm 2.65    I:C   5.5     Advise to check blood sugar 4 times a day   Patient to send blood sugar log for titration. Advise to exercise regularly. Advise to low simple carbohydrate and protein with each  meal diet. Diabetes Care: recommend yearly eye exam, foot exam and urine microalbumin to   creatinine ratio.

## 2022-09-20 NOTE — TELEPHONE ENCOUNTER
She is calling to see if dr. Jersey Toscano signed an electronic order for pump supplies. The request for a prescription for pump supply was sent via [employer]. Please call [phone]    I provided a fax number for her to re submit her request.     She will re fax it today.

## 2022-09-29 ENCOUNTER — OFFICE VISIT (OUTPATIENT)
Dept: FAMILY MEDICINE CLINIC | Age: 62
End: 2022-09-29
Payer: COMMERCIAL

## 2022-09-29 VITALS
TEMPERATURE: 97.1 F | SYSTOLIC BLOOD PRESSURE: 168 MMHG | OXYGEN SATURATION: 99 % | DIASTOLIC BLOOD PRESSURE: 94 MMHG | HEART RATE: 93 BPM | WEIGHT: 222 LBS | BODY MASS INDEX: 39.33 KG/M2

## 2022-09-29 DIAGNOSIS — N20.0 STONE IN KIDNEY: ICD-10-CM

## 2022-09-29 DIAGNOSIS — N30.01 ACUTE CYSTITIS WITH HEMATURIA: Primary | ICD-10-CM

## 2022-09-29 LAB
BILIRUBIN, POC: ABNORMAL
BLOOD URINE, POC: ABNORMAL
CLARITY, POC: ABNORMAL
COLOR, POC: ABNORMAL
GLUCOSE URINE, POC: ABNORMAL
KETONES, POC: ABNORMAL
LEUKOCYTE EST, POC: ABNORMAL
NITRITE, POC: ABNORMAL
PH, POC: 6.5
PROTEIN, POC: 100
SPECIFIC GRAVITY, POC: 1.03
UROBILINOGEN, POC: 0.2

## 2022-09-29 PROCEDURE — 99214 OFFICE O/P EST MOD 30 MIN: CPT | Performed by: NURSE PRACTITIONER

## 2022-09-29 PROCEDURE — 81002 URINALYSIS NONAUTO W/O SCOPE: CPT | Performed by: NURSE PRACTITIONER

## 2022-09-29 RX ORDER — CIPROFLOXACIN 500 MG/1
500 TABLET, FILM COATED ORAL 2 TIMES DAILY
Qty: 14 TABLET | Refills: 0 | Status: SHIPPED | OUTPATIENT
Start: 2022-09-29 | End: 2022-10-06

## 2022-09-29 ASSESSMENT — ENCOUNTER SYMPTOMS: ABDOMINAL DISTENTION: 0

## 2022-09-29 NOTE — PROGRESS NOTES
Mallory Marsh (:  1960) is a 62 y.o. female,Established patient, here for evaluation of the following chief complaint(s):  Urinary Tract Infection      ASSESSMENT/PLAN:  1. Acute cystitis with hematuria  Assessment & Plan:   Increase fluids. Use AZO for symptoms as needed. Orders:  -     ciprofloxacin (CIPRO) 500 MG tablet; Take 1 tablet by mouth 2 times daily for 7 days, Disp-14 tablet, R-0Normal  -     POCT Urinalysis no Micro  -     Culture, Urine  2. Stone in kidney  -     CT KIDNEY WO CONTRAST; Future      No follow-ups on file. SUBJECTIVE/OBJECTIVE:    UTI symptoms from two weeks ago improved with antibiotics. Antibiotics were completed . Woke up this morning passing clots this mourning. Reports flank pain and pain with urination. Denies fevers. Seen by Willie Rowe- miroslava CT to be done for further issues.  Will order today  Current Outpatient Medications   Medication Sig Dispense Refill    ciprofloxacin (CIPRO) 500 MG tablet Take 1 tablet by mouth 2 times daily for 7 days 14 tablet 0    Dulaglutide (TRULICITY) 3 ZB/2.0OQ SOPN Inject 3 mg into the skin once a week 13 Adjustable Dose Pre-filled Pen Syringe 3    cephALEXin (KEFLEX) 500 MG capsule Take 1 capsule by mouth 2 times daily 20 capsule 0    insulin lispro (HUMALOG) 100 UNIT/ML SOLN injection vial INJECT UP  UNITS     UNDER THE SKIN DAILY VIA   INSULIN PUMP 90 mL 3    vitamin D (ERGOCALCIFEROL) 1.25 MG (26162 UT) CAPS capsule TAKE ONE CAPSULE BY MOUTH ONCE WEEKLY 12 capsule 3    liothyronine (CYTOMEL) 5 MCG tablet TAKE 1 TABLET DAILY 90 tablet 3    SYNTHROID 125 MCG tablet TAKE 1 TABLET DAILY 90 tablet 3    metFORMIN (GLUCOPHAGE) 1000 MG tablet TAKE 1 TABLET 2 TIMES DAILYWITH FOOD 180 tablet 2    losartan (COZAAR) 100 MG tablet TAKE ONE TABLET BY MOUTH DAILY 30 tablet 2    diclofenac sodium (VOLTAREN) 1 % GEL APPLY ONE GRAM TOPICALLY TWO TIMES A  g 3    atorvastatin (LIPITOR) 20 MG tablet TAKE ONE TABLET BY MOUTH DAILY 90 tablet 3    insulin lispro (HUMALOG) 100 UNIT/ML injection vial INJECT UP  UNITS     UNDER THE SKIN DAILY VIA   INSULIN PUMP 90 mL 3    Insulin Infusion Pump DIPTI Medtronic 770G insulin pump, Guardian Sensor 3 and transmitter 1 Device 0    medical marijuana Take 1 each by mouth as needed. tiZANidine (ZANAFLEX) 2 MG tablet Take 1 tablet by mouth 3 times daily as needed (spasm) 30 tablet 2    aspirin 81 MG tablet Take 81 mg by mouth daily      Continuous Blood Gluc  (DEXCOM G6 ) DIPTI As needed 1 Device 1    Continuous Blood Gluc Sensor (DEXCOM G6 SENSOR) MISC As needed 4 each 2    glucose blood VI test strips (ANAMARIA CONTOUR NEXT TEST) strip 1 each by In Vitro route 5 times daily As needed. 150 each 4     No current facility-administered medications for this visit. Review of Systems   Cardiovascular:  Negative for chest pain. Gastrointestinal:  Negative for abdominal distention. Genitourinary:  Positive for dysuria, flank pain, frequency, hematuria and urgency. All other systems reviewed and are negative. Vitals:    09/29/22 1046   BP: (!) 168/94   Site: Left Upper Arm   Position: Sitting   Cuff Size: Large Adult   Pulse: 93   Temp: 97.1 °F (36.2 °C)   SpO2: 99%   Weight: 222 lb (100.7 kg)       Physical Exam  Constitutional:       General: She is not in acute distress. HENT:      Head: Normocephalic. Eyes:      Pupils: Pupils are equal, round, and reactive to light. Pulmonary:      Effort: Pulmonary effort is normal. No respiratory distress. Musculoskeletal:         General: Normal range of motion. Cervical back: Normal range of motion. Neurological:      General: No focal deficit present. Mental Status: She is alert and oriented to person, place, and time. Psychiatric:         Mood and Affect: Mood normal.               An electronic signature was used to authenticate this note.     --Christopher Schwartz, APRN - CNP

## 2022-10-02 LAB
ORGANISM: ABNORMAL
URINE CULTURE, ROUTINE: ABNORMAL
URINE CULTURE, ROUTINE: ABNORMAL

## 2022-10-05 ENCOUNTER — HOSPITAL ENCOUNTER (OUTPATIENT)
Dept: CT IMAGING | Age: 62
Discharge: HOME | End: 2022-10-05
Payer: COMMERCIAL

## 2022-10-05 DIAGNOSIS — N20.0 STONE IN KIDNEY: ICD-10-CM

## 2022-10-05 PROCEDURE — 74150 CT ABDOMEN W/O CONTRAST: CPT

## 2022-11-14 RX ORDER — LOSARTAN POTASSIUM 100 MG/1
TABLET ORAL
Qty: 90 TABLET | Refills: 0 | Status: SHIPPED | OUTPATIENT
Start: 2022-11-14

## 2022-11-14 NOTE — TELEPHONE ENCOUNTER
Medication:   Requested Prescriptions     Pending Prescriptions Disp Refills    losartan (COZAAR) 100 MG tablet [Pharmacy Med Name: LOSARTAN TAB 100MG] 90 tablet 0     Sig: TAKE 1 TABLET DAILY       Last Filled:  03/4/22    Patient Phone Number: [phone] (home)     Last appt: 9/20/2022   Next appt: 12/20/2022    Last Labs DM:   Lab Results   Component Value Date/Time    LABA1C 7.0 09/20/2022 04:13 PM     Last Lipid:   Lab Results   Component Value Date/Time    CHOL 141 10/18/2021 07:57 AM    TRIG 95 10/18/2021 07:57 AM    HDL 33 10/18/2021 07:57 AM    HDL 34 03/09/2012 09:16 AM    LDLCALC 89 10/18/2021 07:57 AM     Last PSA: No results found for: PSA  Last Thyroid:   Lab Results   Component Value Date/Time    TSH 1.00 10/18/2021 07:57 AM    FT3 2.6 06/08/2018 08:28 AM    B8LZYST 0.91 08/22/2013 11:55 AM    T4FREE 1.3 10/18/2021 07:57 AM

## 2022-12-05 RX ORDER — ATORVASTATIN CALCIUM 20 MG/1
TABLET, FILM COATED ORAL
Qty: 90 TABLET | Refills: 3 | Status: SHIPPED | OUTPATIENT
Start: 2022-12-05

## 2022-12-20 ENCOUNTER — OFFICE VISIT (OUTPATIENT)
Dept: ENDOCRINOLOGY | Age: 62
End: 2022-12-20
Payer: COMMERCIAL

## 2022-12-20 VITALS
HEIGHT: 63 IN | TEMPERATURE: 98 F | HEART RATE: 67 BPM | RESPIRATION RATE: 14 BRPM | SYSTOLIC BLOOD PRESSURE: 152 MMHG | WEIGHT: 226 LBS | BODY MASS INDEX: 40.04 KG/M2 | DIASTOLIC BLOOD PRESSURE: 86 MMHG

## 2022-12-20 DIAGNOSIS — Z96.41 INSULIN PUMP IN PLACE: ICD-10-CM

## 2022-12-20 DIAGNOSIS — E11.9 DIABETES MELLITUS TYPE 2, INSULIN DEPENDENT (HCC): Primary | ICD-10-CM

## 2022-12-20 DIAGNOSIS — Z79.4 DIABETES MELLITUS TYPE 2, INSULIN DEPENDENT (HCC): Primary | ICD-10-CM

## 2022-12-20 LAB — HBA1C MFR BLD: 7.4 %

## 2022-12-20 PROCEDURE — 99214 OFFICE O/P EST MOD 30 MIN: CPT | Performed by: INTERNAL MEDICINE

## 2022-12-20 PROCEDURE — 3051F HG A1C>EQUAL 7.0%<8.0%: CPT | Performed by: INTERNAL MEDICINE

## 2022-12-20 PROCEDURE — 3078F DIAST BP <80 MM HG: CPT | Performed by: INTERNAL MEDICINE

## 2022-12-20 PROCEDURE — 3074F SYST BP LT 130 MM HG: CPT | Performed by: INTERNAL MEDICINE

## 2022-12-20 PROCEDURE — 83036 HEMOGLOBIN GLYCOSYLATED A1C: CPT | Performed by: INTERNAL MEDICINE

## 2022-12-20 RX ORDER — FLASH GLUCOSE SENSOR
KIT MISCELLANEOUS
Qty: 6 EACH | Refills: 2 | Status: SHIPPED | OUTPATIENT
Start: 2022-12-20

## 2022-12-20 RX ORDER — FLASH GLUCOSE SCANNING READER
EACH MISCELLANEOUS
Qty: 1 EACH | Refills: 0 | Status: SHIPPED | OUTPATIENT
Start: 2022-12-20

## 2022-12-20 NOTE — PROGRESS NOTES
Seen as  patient for diabetes      Interim:    States glucose higher  Lack of motivation  May miss bolus    She has medtronic sensor  Had some issues  Does not like it    Diagnosed with Type 2 diabetes mellitus in  2005  Known diabetic complications: none   Uncontrolled, moderate    Current diabetic medications     Metformin 1gm BID  jardiance  Off of it due to yeast infection  Trulicity 3mg    Insulin pump   MN   3.2  6 am 2.0  5:30 pm 2.65    I:C  5.5  CF 22  Target 110-120    Last A1c  7.4%<---7%<-----7.1%<---- 6.4%<--- 6.6%<----- 6.8% < ---- 7%<----6.8%<---- 8.2%<----7.3%<-----   7.6%<--- 7.9 <--- -8.3    Moderate, controlled    Prior visit with dietician: no  Current diet: on average, 3 meals per day   Current exercise: walking   Current monitoring regimen: home blood tests - 1 times daily     Has brought blood glucose log/meter:yes  Home blood sugar records:     Any episodes of hypoglycemia?  No recent    No Hx of CAD , PVD, CVA    Hyperlipidemia:   For   Years  Takes lipitor 20mg  Controlled  LDL 76 on 3/19   LDL 78 on 10/20    Hypertension for years  Stable  Takes  Losartan 100mg    Last eye exam: 2021  Last foot exam: 8/21  Last microalbumin to creatinine ratio:   10/21    She has a h/o hypothyroidism    H/o QUIJANO for hyperthyroidism    S/p Thyroidectomy for thyroid Nodule: had atypical cells on FNA and patient had surgery with  [facility] in 2/2013, normal path, Hurtle cell adenoma;     TSH 2.68 on 3/19  TSH 1.84 on 9/20  TSH 1.0 on 10/21    Synthroid 125mcg cytomel 5mcg daily    She is taking Vit D 50,000 IU , weekly, needs level checked    Past Medical History:   Diagnosis Date    Cystic acne     Diverticulitis large intestine 7/7/2010    Diverticulosis of colon     Episcleritis     HTN (hypertension) 12/6/2012    Hyperemesis gravidarum     Hyperlipidemia     Hyperthyroidism     Malignant neoplasm of anterior wall of urinary bladder ([facility] [address] 1/13/2017    Pregnancies     2/   2-vaginal deliveries    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    Type II or unspecified type diabetes mellitus without mention of complication, not stated as uncontrolled     Vitamin D deficiency 11/2012     Past Surgical History:   Procedure Laterality Date    BLADDER SURGERY  12/01/2016    cancerous tumor removed from bladder    COLONOSCOPY  2012    polyps    OTHER SURGICAL HISTORY  2003    neuro surgery- CTS repair    THYROIDECTOMY  2/8740    UMBILICAL HERNIA REPAIR  5/5/16    with mesh      Current Outpatient Medications   Medication Sig Dispense Refill    metFORMIN (GLUCOPHAGE) 1000 MG tablet TAKE 1 TABLET 2 TIMES DAILYWITH FOOD 180 tablet 2    atorvastatin (LIPITOR) 20 MG tablet TAKE ONE TABLET BY MOUTH DAILY 90 tablet 3    losartan (COZAAR) 100 MG tablet TAKE 1 TABLET DAILY 90 tablet 0    Dulaglutide (TRULICITY) 3 KF/5.2TJ SOPN Inject 3 mg into the skin once a week 13 Adjustable Dose Pre-filled Pen Syringe 3    cephALEXin (KEFLEX) 500 MG capsule Take 1 capsule by mouth 2 times daily 20 capsule 0    insulin lispro (HUMALOG) 100 UNIT/ML SOLN injection vial INJECT UP  UNITS     UNDER THE SKIN DAILY VIA   INSULIN PUMP 90 mL 3    vitamin D (ERGOCALCIFEROL) 1.25 MG (82249 UT) CAPS capsule TAKE ONE CAPSULE BY MOUTH ONCE WEEKLY 12 capsule 3    liothyronine (CYTOMEL) 5 MCG tablet TAKE 1 TABLET DAILY 90 tablet 3    SYNTHROID 125 MCG tablet TAKE 1 TABLET DAILY 90 tablet 3    diclofenac sodium (VOLTAREN) 1 % GEL APPLY ONE GRAM TOPICALLY TWO TIMES A  g 3    insulin lispro (HUMALOG) 100 UNIT/ML injection vial INJECT UP  UNITS     UNDER THE SKIN DAILY VIA   INSULIN PUMP 90 mL 3    Insulin Infusion Pump DIPTI [employer] 770G insulin pump, Guardian Sensor 3 and transmitter 1 Device 0    medical marijuana Take 1 each by mouth as needed.       tiZANidine (ZANAFLEX) 2 MG tablet Take 1 tablet by mouth 3 times daily as needed (spasm) 30 tablet 2    aspirin 81 MG tablet Take 81 mg by mouth daily      Continuous Blood Gluc  (DEXCOM G6 ) DIPTI As needed 1 Device 1    Continuous Blood Gluc Sensor (DEXCOM G6 SENSOR) MISC As needed 4 each 2    glucose blood VI test strips (ANAMARIA CONTOUR NEXT TEST) strip 1 each by In Vitro route 5 times daily As needed. 150 each 4     No current facility-administered medications for this visit. Review of Systems  Scanned, reviewed    Vitals:    12/20/22 1626   BP: (!) 152/86   Pulse: 67   Resp: 14   Temp: 98 °F (36.7 °C)         Constitutional: Well-developed, appears stated age, cooperative, in no acute distress  H/E/N/M/T:atraumatic, normocephalic, external ears, nose, lips normal without lesions  Eyes: Lids, lashes, conjunctivae and sclerae normal, No proptosis, no redness  Neck: supple, symmetrical, no swelling  Skin: No obvious rashes or lesions present. Skin and hair texture normal  Psychiatric: Judgement and Insight:  judgement and insight appear normal  Neuro: Normal without focal findings, speech is normal normal, speech is spontaneous  Chest: No labored breathing, no chest deformity, no stridor  Musculoskeletal: No joint deformity, swelling  Foot exam:  Monofilament detected , no ulcers    Lab Reviewed   No components found for: CHLPL  Lab Results   Component Value Date    TRIG 95 10/18/2021    TRIG 139 10/19/2020    TRIG 142 03/22/2019     Lab Results   Component Value Date    HDL 33 (L) 10/18/2021    HDL 35 (L) 10/19/2020    HDL 37 (L) 03/22/2019     Lab Results   Component Value Date    LDLCALC 89 10/18/2021    LDLCALC 78 10/19/2020    LDLCALC 76 03/22/2019     Lab Results   Component Value Date    LABVLDL 19 10/18/2021    LABVLDL 28 10/19/2020    LABVLDL 28 03/22/2019     Lab Results   Component Value Date    LABA1C 7.0 09/20/2022       Assessment:     Abi Cespedes is a 65 y.o. female with :    1.T2DM: Longstanding, fair control, on insulin pump and oral medication. Reviewed log, stable. Needs CHO restriction to 45 gram per meal as eating 80 gram at times. ELIS Bender response to trulicity. No major fluctuation, glucose stable. Fasting high, increase basal rate, she would like to wait and make diet changes as eating 80 gram CHO at time. She is willing to try freestyle carlin  2. HTN : Blood pressure slightly high  3. HLD: LDL at goal  4. Hypothyroidism: TSH at goal  5. Insulin pump in place: No failures  6. Vitamin D insufficiency:  Last level normal, on weekly dose now, She is taking  IU  7. Back pain:  She takes marijuana for it    Plan:     basal as:     MN   3.2  6 am 2.0  5:30 pm 2.65    I:C   5.5     Advise to check blood sugar 4 times a day   Patient to send blood sugar log for titration. Advise to exercise regularly. Advise to low simple carbohydrate and protein with each  meal diet. Diabetes Care: recommend yearly eye exam, foot exam and urine microalbumin to   creatinine ratio.

## 2022-12-28 ENCOUNTER — OFFICE VISIT (OUTPATIENT)
Dept: FAMILY MEDICINE CLINIC | Age: 62
End: 2022-12-28
Payer: COMMERCIAL

## 2022-12-28 ENCOUNTER — PATIENT MESSAGE (OUTPATIENT)
Dept: ENDOCRINOLOGY | Age: 62
End: 2022-12-28

## 2022-12-28 VITALS
HEIGHT: 63 IN | BODY MASS INDEX: 39.58 KG/M2 | OXYGEN SATURATION: 96 % | TEMPERATURE: 97.2 F | DIASTOLIC BLOOD PRESSURE: 92 MMHG | HEART RATE: 71 BPM | SYSTOLIC BLOOD PRESSURE: 150 MMHG | WEIGHT: 223.4 LBS

## 2022-12-28 DIAGNOSIS — E11.9 DIABETES MELLITUS TYPE 2, INSULIN DEPENDENT (HCC): ICD-10-CM

## 2022-12-28 DIAGNOSIS — E05.20 TOXIC MULTINODULAR GOITER: ICD-10-CM

## 2022-12-28 DIAGNOSIS — E78.2 MIXED HYPERLIPIDEMIA: ICD-10-CM

## 2022-12-28 DIAGNOSIS — Z79.4 DIABETES MELLITUS TYPE 2, INSULIN DEPENDENT (HCC): ICD-10-CM

## 2022-12-28 DIAGNOSIS — E11.65 UNCONTROLLED TYPE 2 DIABETES MELLITUS WITH HYPERGLYCEMIA (HCC): ICD-10-CM

## 2022-12-28 DIAGNOSIS — E11.9 DIABETES MELLITUS TYPE 2, INSULIN DEPENDENT (HCC): Primary | ICD-10-CM

## 2022-12-28 DIAGNOSIS — L70.0 CYSTIC ACNE VULGARIS: ICD-10-CM

## 2022-12-28 DIAGNOSIS — I10 ESSENTIAL HYPERTENSION: ICD-10-CM

## 2022-12-28 DIAGNOSIS — G47.33 OSA (OBSTRUCTIVE SLEEP APNEA): ICD-10-CM

## 2022-12-28 DIAGNOSIS — E55.9 VITAMIN D DEFICIENCY: ICD-10-CM

## 2022-12-28 DIAGNOSIS — Z00.00 WELL ADULT EXAM: ICD-10-CM

## 2022-12-28 DIAGNOSIS — Z00.00 ENCOUNTER FOR WELL ADULT EXAM WITHOUT ABNORMAL FINDINGS: Primary | ICD-10-CM

## 2022-12-28 DIAGNOSIS — E89.0 POSTOPERATIVE HYPOTHYROIDISM: ICD-10-CM

## 2022-12-28 DIAGNOSIS — Z79.4 DIABETES MELLITUS TYPE 2, INSULIN DEPENDENT (HCC): Primary | ICD-10-CM

## 2022-12-28 DIAGNOSIS — K63.5 HYPERPLASTIC POLYP OF TRANSVERSE COLON: ICD-10-CM

## 2022-12-28 PROBLEM — R07.89 OTHER CHEST PAIN: Status: RESOLVED | Noted: 2018-09-26 | Resolved: 2022-12-28

## 2022-12-28 PROBLEM — J01.90 ACUTE BACTERIAL SINUSITIS: Status: RESOLVED | Noted: 2022-07-14 | Resolved: 2022-12-28

## 2022-12-28 PROBLEM — R06.09 DOE (DYSPNEA ON EXERTION): Status: RESOLVED | Noted: 2020-10-12 | Resolved: 2022-12-28

## 2022-12-28 PROBLEM — B96.89 ACUTE BACTERIAL SINUSITIS: Status: RESOLVED | Noted: 2022-07-14 | Resolved: 2022-12-28

## 2022-12-28 LAB
A/G RATIO: 1.7 (ref 1.1–2.2)
ALBUMIN SERPL-MCNC: 4.5 G/DL (ref 3.4–5)
ALP BLD-CCNC: 92 U/L (ref 40–129)
ALT SERPL-CCNC: 17 U/L (ref 10–40)
ANION GAP SERPL CALCULATED.3IONS-SCNC: 14 MMOL/L (ref 3–16)
AST SERPL-CCNC: 13 U/L (ref 15–37)
BASOPHILS ABSOLUTE: 0 K/UL (ref 0–0.2)
BASOPHILS RELATIVE PERCENT: 0.2 %
BILIRUB SERPL-MCNC: 0.6 MG/DL (ref 0–1)
BUN BLDV-MCNC: 11 MG/DL (ref 7–20)
CALCIUM SERPL-MCNC: 9.9 MG/DL (ref 8.3–10.6)
CHLORIDE BLD-SCNC: 97 MMOL/L (ref 99–110)
CHOLESTEROL, TOTAL: 166 MG/DL (ref 0–199)
CO2: 27 MMOL/L (ref 21–32)
CREAT SERPL-MCNC: 0.6 MG/DL (ref 0.6–1.2)
CREATININE URINE: 120.3 MG/DL (ref 28–259)
EOSINOPHILS ABSOLUTE: 0.1 K/UL (ref 0–0.6)
EOSINOPHILS RELATIVE PERCENT: 1.7 %
GFR SERPL CREATININE-BSD FRML MDRD: >60 ML/MIN/{1.73_M2}
GLUCOSE BLD-MCNC: 149 MG/DL (ref 70–99)
HCT VFR BLD CALC: 40.5 % (ref 36–48)
HDLC SERPL-MCNC: 38 MG/DL (ref 40–60)
HEMOGLOBIN: 13.3 G/DL (ref 12–16)
LDL CHOLESTEROL CALCULATED: 99 MG/DL
LYMPHOCYTES ABSOLUTE: 1.7 K/UL (ref 1–5.1)
LYMPHOCYTES RELATIVE PERCENT: 20.3 %
MCH RBC QN AUTO: 28.4 PG (ref 26–34)
MCHC RBC AUTO-ENTMCNC: 32.8 G/DL (ref 31–36)
MCV RBC AUTO: 86.5 FL (ref 80–100)
MICROALBUMIN UR-MCNC: 2.4 MG/DL
MICROALBUMIN/CREAT UR-RTO: 20 MG/G (ref 0–30)
MONOCYTES ABSOLUTE: 0.7 K/UL (ref 0–1.3)
MONOCYTES RELATIVE PERCENT: 8.6 %
NEUTROPHILS ABSOLUTE: 5.7 K/UL (ref 1.7–7.7)
NEUTROPHILS RELATIVE PERCENT: 69.2 %
PDW BLD-RTO: 14.5 % (ref 12.4–15.4)
PLATELET # BLD: 288 K/UL (ref 135–450)
PMV BLD AUTO: 8.6 FL (ref 5–10.5)
POTASSIUM SERPL-SCNC: 4.6 MMOL/L (ref 3.5–5.1)
RBC # BLD: 4.68 M/UL (ref 4–5.2)
SODIUM BLD-SCNC: 138 MMOL/L (ref 136–145)
T4 FREE: 1.1 NG/DL (ref 0.9–1.8)
TOTAL PROTEIN: 7.2 G/DL (ref 6.4–8.2)
TRIGL SERPL-MCNC: 144 MG/DL (ref 0–150)
TSH SERPL DL<=0.05 MIU/L-ACNC: 2.3 UIU/ML (ref 0.27–4.2)
VITAMIN D 25-HYDROXY: 58 NG/ML
VLDLC SERPL CALC-MCNC: 29 MG/DL
WBC # BLD: 8.2 K/UL (ref 4–11)

## 2022-12-28 PROCEDURE — 99487 CPLX CHRNC CARE 1ST 60 MIN: CPT | Performed by: FAMILY MEDICINE

## 2022-12-28 RX ORDER — BLOOD-GLUCOSE,RECEIVER,CONT
EACH MISCELLANEOUS
Qty: 1 EACH | Refills: 0 | Status: SHIPPED | OUTPATIENT
Start: 2022-12-28

## 2022-12-28 RX ORDER — HYDROCHLOROTHIAZIDE 12.5 MG/1
12.5 TABLET ORAL DAILY
Qty: 90 TABLET | Refills: 3 | Status: SHIPPED | OUTPATIENT
Start: 2022-12-28

## 2022-12-28 RX ORDER — BLOOD-GLUCOSE TRANSMITTER
EACH MISCELLANEOUS
Qty: 1 EACH | Refills: 0 | Status: SHIPPED | OUTPATIENT
Start: 2022-12-28

## 2022-12-28 RX ORDER — BLOOD-GLUCOSE SENSOR
EACH MISCELLANEOUS
Qty: 3 EACH | Refills: 5 | Status: SHIPPED | OUTPATIENT
Start: 2022-12-28

## 2022-12-28 ASSESSMENT — PATIENT HEALTH QUESTIONNAIRE - PHQ9
2. FEELING DOWN, DEPRESSED OR HOPELESS: 0
SUM OF ALL RESPONSES TO PHQ QUESTIONS 1-9: 0
SUM OF ALL RESPONSES TO PHQ9 QUESTIONS 1 & 2: 0
9. THOUGHTS THAT YOU WOULD BE BETTER OFF DEAD, OR OF HURTING YOURSELF: 0
4. FEELING TIRED OR HAVING LITTLE ENERGY: 0
10. IF YOU CHECKED OFF ANY PROBLEMS, HOW DIFFICULT HAVE THESE PROBLEMS MADE IT FOR YOU TO DO YOUR WORK, TAKE CARE OF THINGS AT HOME, OR GET ALONG WITH OTHER PEOPLE: 0
7. TROUBLE CONCENTRATING ON THINGS, SUCH AS READING THE NEWSPAPER OR WATCHING TELEVISION: 0
5. POOR APPETITE OR OVEREATING: 0
3. TROUBLE FALLING OR STAYING ASLEEP: 0
8. MOVING OR SPEAKING SO SLOWLY THAT OTHER PEOPLE COULD HAVE NOTICED. OR THE OPPOSITE, BEING SO FIGETY OR RESTLESS THAT YOU HAVE BEEN MOVING AROUND A LOT MORE THAN USUAL: 0
SUM OF ALL RESPONSES TO PHQ QUESTIONS 1-9: 0
6. FEELING BAD ABOUT YOURSELF - OR THAT YOU ARE A FAILURE OR HAVE LET YOURSELF OR YOUR FAMILY DOWN: 0
SUM OF ALL RESPONSES TO PHQ QUESTIONS 1-9: 0
1. LITTLE INTEREST OR PLEASURE IN DOING THINGS: 0
SUM OF ALL RESPONSES TO PHQ QUESTIONS 1-9: 0

## 2022-12-28 ASSESSMENT — ENCOUNTER SYMPTOMS
APNEA: 0
RECTAL PAIN: 0
ABDOMINAL DISTENTION: 0
BACK PAIN: 0
WHEEZING: 0
COUGH: 0
CONSTIPATION: 0
SORE THROAT: 0
RHINORRHEA: 0
SHORTNESS OF BREATH: 0
PHOTOPHOBIA: 0
NAUSEA: 0
BLOOD IN STOOL: 0
FACIAL SWELLING: 0
ANAL BLEEDING: 0
VOICE CHANGE: 0
DIARRHEA: 0
EYE DISCHARGE: 0
VOMITING: 0
TROUBLE SWALLOWING: 0
STRIDOR: 0
EYE ITCHING: 0
EYE PAIN: 0
ABDOMINAL PAIN: 0
EYE REDNESS: 0
COLOR CHANGE: 0
SINUS PRESSURE: 0
CHEST TIGHTNESS: 0
CHOKING: 0

## 2022-12-28 NOTE — TELEPHONE ENCOUNTER
From: Darlene Medrano  To: Dr. Nash Printers: 12/28/2022 1:06 PM EST  Subject: Continuous Glucose meter    Dr. Manuela Chaves,  My insurance will not cover the [facility] meter you prescribed last week. Can you [facility]com sensor system [facility]?   Thanks,  Laureen Wilcox

## 2022-12-28 NOTE — PROGRESS NOTES
Well Adult Note  Name: Courtney Kurtz Date: 2022   MRN: [id number] Sex: Female   Age: 65 y.o. Ethnicity: Non- / Non    : 1960 Race: White (non-)      Hema Favorite is here for well adult exam.  Historcpx  Cc--acne worse-  DM2--off since off jardiance(stopped because of recurrent UTIs)-last A1c 7.4      Review of Systems   Constitutional:  Negative for activity change, appetite change, chills, diaphoresis, fatigue, fever and unexpected weight change. HENT:  Negative for congestion, dental problem, drooling, ear discharge, ear pain, facial swelling, hearing loss, mouth sores, nosebleeds, postnasal drip, rhinorrhea, sinus pressure, sneezing, sore throat, tinnitus, trouble swallowing and voice change. Eyes:  Negative for photophobia, pain, discharge, redness, itching and visual disturbance. Respiratory:  Negative for apnea, cough, choking, chest tightness, shortness of breath, wheezing and stridor. Cardiovascular:  Negative for chest pain, palpitations and leg swelling. Gastrointestinal:  Negative for abdominal distention, abdominal pain, anal bleeding, blood in stool, constipation, diarrhea, nausea, rectal pain and vomiting. Genitourinary:  Negative for difficulty urinating, dysuria, enuresis, flank pain, frequency, genital sores and hematuria. Musculoskeletal:  Negative for arthralgias, back pain, gait problem, joint swelling, myalgias, neck pain and neck stiffness. Skin:  Negative for color change, pallor, rash and wound. Neurological:  Negative for dizziness, tremors, seizures, syncope, facial asymmetry, speech difficulty, weakness, light-headedness, numbness and headaches. Hematological:  Negative for adenopathy. Does not bruise/bleed easily. Psychiatric/Behavioral:  Negative for agitation, behavioral problems, confusion, decreased concentration, dysphoric mood, hallucinations, self-injury, sleep disturbance and suicidal ideas.  The patient is not nervous/anxious and is not hyperactive. Allergies   Allergen Reactions    Penicillins     Tetracyclines & Related Other (See Comments)     Vertigo and red eyes    Sulfa Antibiotics Rash         Prior to Visit Medications    Medication Sig Taking? Authorizing Provider   Continuous Blood Gluc  (FREESTYLE NUVIA 2 READER) DIPTI Change every 14 days Yes Shy Biggs MD   Continuous Blood Gluc Sensor (FREESTYLE NUVIA 2 SENSOR) MISC Every 2 weeks Yes Shy Biggs MD   metFORMIN (GLUCOPHAGE) 1000 MG tablet TAKE 1 TABLET 2 TIMES [address]  Patient taking differently: daily (with breakfast) TAKE 1 TABLET 2 TIMES DAILYWITH FOOD Yes Thor Tellez MD   atorvastatin (LIPITOR) 20 MG tablet TAKE ONE TABLET BY MOUTH DAILY Yes Thor Tellez MD   losartan (COZAAR) 100 MG tablet TAKE 1 TABLET DAILY Yes Shy Biggs MD   Dulaglutide (TRULICITY) 3 MY/8.1WZ SOPN Inject 3 mg into the skin once a week Yes Shy Biggs MD   cephALEXin (KEFLEX) 500 MG capsule Take 1 capsule by mouth 2 times daily Yes AMNA Gan CNP   vitamin D (ERGOCALCIFEROL) 1.25 MG (06881 UT) CAPS capsule TAKE ONE CAPSULE BY MOUTH ONCE WEEKLY Yes Muna Brooks MD   liothyronine (CYTOMEL) 5 MCG tablet TAKE 1 TABLET DAILY Yes AMNA North CNP   SYNTHROID 125 MCG tablet TAKE 1 TABLET DAILY Yes Thor Tellez MD   diclofenac sodium (VOLTAREN) 1 % GEL APPLY ONE GRAM TOPICALLY TWO TIMES A DAY Yes Thor Tellez MD   insulin lispro (HUMALOG) 100 UNIT/ML injection vial INJECT UP  UNITS     UNDER THE SKIN DAILY VIA   INSULIN PUMP Yes Thor Tellez MD   Insulin Infusion Pump DIPTI Medtronic 770G insulin pump, Guardian Sensor 3 and transmitter Yes Shy Biggs MD   medical marijuana Take 1 each by mouth as needed.  Yes Historical Provider, MD   tiZANidine (ZANAFLEX) 2 MG tablet Take 1 tablet by mouth 3 times daily as needed (spasm) Yes Thor Tellez MD   aspirin 81 MG tablet Take 81 mg by mouth daily Yes Historical Provider, MD   glucose blood VI test strips (ANAMARIA CONTOUR NEXT TEST) strip 1 each by In Vitro route 5 times daily As needed. Yes Dong Elizondo APRN - CNP   Continuous Blood Gluc  ([address]) [address] As needed  Patient not taking: No sig reported  Daniel Ortiz MD   Continuous Blood Gluc Sensor ([address]) MISC As needed  Patient not taking: No sig reported  Daniel Ortiz MD         Past Medical History:   Diagnosis Date    Cystic acne     Diverticulitis large intestine 7/7/2010    Diverticulosis of colon     Episcleritis     HTN (hypertension) 12/6/2012    Hyperemesis gravidarum     Hyperlipidemia     Hyperplastic polyp of transverse colon 12/28/2022    Hyperthyroidism     Malignant neoplasm of anterior wall of urinary bladder ([facility] [address] 1/13/2017    Pregnancies     2/   2-vaginal deliveries    Thyroid follicular adenoma 36/7174    Hurtle Cell adenoma    Type II or unspecified type diabetes mellitus without mention of complication, not stated as uncontrolled     Vitamin D deficiency 11/2012       Past Surgical History:   Procedure Laterality Date    BLADDER SURGERY  12/01/2016    cancerous tumor removed from bladder    COLONOSCOPY  2012    polyps    COLONOSCOPY  04/20/2018    Go-polyps    OTHER SURGICAL HISTORY  2003    neuro surgery- CTS repair    THYROIDECTOMY  64/2121    UMBILICAL HERNIA REPAIR  05/05/2016    with mesh          Family History   Problem Relation Age of Onset    Diabetes Mother     Alzheimer's Disease Mother     Heart Disease Father 45        several more    Breast Cancer Neg Hx     Ovarian Cancer Neg Hx        Social History     Tobacco Use    Smoking status: Never    Smokeless tobacco: Never   Vaping Use    Vaping Use: Never used   Substance Use Topics    Alcohol use:  Yes     Alcohol/week: 1.0 standard drink     Types: 1 Glasses of wine per week    Drug use: No       Objective   BP (!) 150/92   Pulse 71   Temp 97.2 °F (36.2 °C)   Ht 5' 3\" (1.6 m)   Wt 223 lb 6.4 oz (101.3 kg)   LMP 09/30/2016   SpO2 96%   BMI 39.57 kg/m²   Wt Readings from Last 3 Encounters:   12/28/22 223 lb 6.4 oz (101.3 kg)   12/20/22 226 lb (102.5 kg)   09/29/22 222 lb (100.7 kg)     There were no vitals filed for this visit. Physical Exam  Vitals reviewed. Constitutional:       General: She is not in acute distress. Appearance: She is well-developed. HENT:      Head: Normocephalic. Right Ear: Tympanic membrane and ear canal normal.      Left Ear: Tympanic membrane and ear canal normal.      Nose: No rhinorrhea. Mouth/Throat:      Pharynx: No oropharyngeal exudate or posterior oropharyngeal erythema. Eyes:      General:         Right eye: No discharge. Left eye: No discharge. Conjunctiva/sclera: Conjunctivae normal.      Pupils: Pupils are equal, round, and reactive to light. Neck:      Thyroid: No thyromegaly. Vascular: No carotid bruit or JVD. Trachea: No tracheal deviation. Cardiovascular:      Rate and Rhythm: Normal rate and regular rhythm. Pulses: Normal pulses. Heart sounds: Normal heart sounds. No murmur heard. No gallop. Pulmonary:      Effort: Pulmonary effort is normal. No respiratory distress. Breath sounds: Normal breath sounds. No stridor. No wheezing or rales. Chest:      Chest wall: No tenderness. Abdominal:      General: Bowel sounds are normal. There is no distension. Palpations: Abdomen is soft. There is no mass. Tenderness: There is no abdominal tenderness. There is no right CVA tenderness, left CVA tenderness, guarding or rebound. Hernia: No hernia is present. Musculoskeletal:         General: No swelling, tenderness, deformity or signs of injury. Cervical back: Normal range of motion. No rigidity or tenderness. Right lower leg: No edema. Left lower leg: No edema.    Lymphadenopathy: Cervical: No cervical adenopathy. Skin:     General: Skin is warm and dry. Coloration: Skin is not pale. Findings: No erythema or rash. Neurological:      Mental Status: She is alert and oriented to person, place, and time. Cranial Nerves: No cranial nerve deficit. Motor: No weakness or abnormal muscle tone. Coordination: Coordination normal.      Gait: Gait normal.      Deep Tendon Reflexes: Reflexes normal.   Psychiatric:         Mood and Affect: Mood normal.         Behavior: Behavior normal.         Thought Content: Thought content normal.         Judgment: Judgment normal.         Assessment   Plan   1. Hyperplastic polyp of transverse colon  2. Uncontrolled type 2 diabetes mellitus with hyperglycemia ([facility] [address]  Assessment & Plan:   Borderline controlled, continue current medications and continue current treatment plan-labs--sees endo  3. CASTILLO (obstructive sleep apnea)  Assessment & Plan:   Uncontrolled, couldn't tolerate CPAP  4. Vitamin D deficiency  Assessment & Plan:   Unclear control, changes made today: labs  5. Toxic multinodular goiter  Assessment & Plan:   labs  6. Postoperative hypothyroidism  Assessment & Plan:   Unclear control, changes made today: labs  7. Mixed hyperlipidemia  Assessment & Plan:  labs   8. Diabetes mellitus type 2, insulin dependent ([facility] [address]  Assessment & Plan:   Well-controlled, continue current medications-labs  9. Cystic acne vulgaris  Assessment & Plan: Will refer to derm  10.  Essential hypertension  Assessment & Plan:   Not controlled--add 12.5 HCTZ           Personalized Preventive Plan   Current Health Maintenance Status  Immunization History   Administered Date(s) Administered    COVID-19, PFIZER PURPLE top, DILUTE for use, (age 15 y+), 30mcg/0.3mL 03/05/2021, 04/02/2021, 11/05/2021    Influenza Virus Vaccine 11/01/2014, 10/25/2019, 09/18/2020, 10/01/2021    Influenza Whole 11/01/2014    Influenza, FLUARIX, FLULAVAL, FLUZONE (age 10 mo+) AND AFLURIA, (age 1 y+), PF, 0.5mL 10/26/2018, 10/25/2019, 09/18/2020    Influenza, FLUCELVAX, (age 10 mo+), MDCK, PF, 0.5mL 11/09/2017, 09/15/2022    Pneumococcal Polysaccharide ([id number]) 10/06/2016    Tdap (Boostrix, Adacel) 05/05/2004, 09/21/2016    Tetanus 05/05/2004        Health Maintenance   Topic Date Due    Cervical cancer screen  Never done    Diabetic foot exam  10/12/2022    Diabetic Alb to Cr ratio (uACR) test  10/18/2022    Lipids  10/18/2022    GFR test (Diabetes, CKD 3-4, OR last GFR 15-59)  10/18/2022    Depression Monitoring  07/14/2023    A1C test (Diabetic or Prediabetic)  12/20/2023    Breast cancer screen  06/15/2024    Diabetic retinal exam  06/20/2024    DTaP/Tdap/Td vaccine (3 - Td or Tdap) 09/21/2026    Colorectal Cancer Screen  04/20/2028    Flu vaccine  Completed    Shingles vaccine  Completed    Pneumococcal 0-64 years Vaccine  Completed    COVID-19 Vaccine  Completed    Hepatitis C screen  Completed    Hepatitis A vaccine  Aged Out    Hib vaccine  Aged Out    Meningococcal (ACWY) vaccine  Aged Out    HIV screen  Discontinued     Recommendations for [employer] Due: see orders and patient instructions/AVS.    No follow-ups on file.

## 2022-12-28 NOTE — ASSESSMENT & PLAN NOTE
Borderline controlled, continue current medications and continue current treatment plan-labs--sees endo

## 2022-12-29 LAB
ESTIMATED AVERAGE GLUCOSE: 182.9 MG/DL
HBA1C MFR BLD: 8 %

## 2023-01-27 ENCOUNTER — OFFICE VISIT (OUTPATIENT)
Dept: FAMILY MEDICINE CLINIC | Age: 63
End: 2023-01-27
Payer: COMMERCIAL

## 2023-01-27 VITALS
HEART RATE: 95 BPM | DIASTOLIC BLOOD PRESSURE: 80 MMHG | BODY MASS INDEX: 38.7 KG/M2 | WEIGHT: 218.4 LBS | OXYGEN SATURATION: 98 % | SYSTOLIC BLOOD PRESSURE: 138 MMHG | HEIGHT: 63 IN

## 2023-01-27 DIAGNOSIS — E11.9 DIABETES MELLITUS TYPE 2, INSULIN DEPENDENT (HCC): ICD-10-CM

## 2023-01-27 DIAGNOSIS — Z79.4 DIABETES MELLITUS TYPE 2, INSULIN DEPENDENT (HCC): ICD-10-CM

## 2023-01-27 DIAGNOSIS — I10 ESSENTIAL HYPERTENSION: ICD-10-CM

## 2023-01-27 DIAGNOSIS — E11.65 UNCONTROLLED TYPE 2 DIABETES MELLITUS WITH HYPERGLYCEMIA (HCC): ICD-10-CM

## 2023-01-27 PROCEDURE — 99213 OFFICE O/P EST LOW 20 MIN: CPT | Performed by: FAMILY MEDICINE

## 2023-01-27 PROCEDURE — 3079F DIAST BP 80-89 MM HG: CPT | Performed by: FAMILY MEDICINE

## 2023-01-27 PROCEDURE — 3075F SYST BP GE 130 - 139MM HG: CPT | Performed by: FAMILY MEDICINE

## 2023-01-27 RX ORDER — LOSARTAN POTASSIUM AND HYDROCHLOROTHIAZIDE 25; 100 MG/1; MG/1
1 TABLET ORAL DAILY
Qty: 90 TABLET | Refills: 3 | Status: SHIPPED | OUTPATIENT
Start: 2023-01-27

## 2023-01-27 SDOH — ECONOMIC STABILITY: FOOD INSECURITY: WITHIN THE PAST 12 MONTHS, THE FOOD YOU BOUGHT JUST DIDN'T LAST AND YOU DIDN'T HAVE MONEY TO GET MORE.: NEVER TRUE

## 2023-01-27 SDOH — ECONOMIC STABILITY: FOOD INSECURITY: WITHIN THE PAST 12 MONTHS, YOU WORRIED THAT YOUR FOOD WOULD RUN OUT BEFORE YOU GOT MONEY TO BUY MORE.: NEVER TRUE

## 2023-01-27 ASSESSMENT — PATIENT HEALTH QUESTIONNAIRE - PHQ9
1. LITTLE INTEREST OR PLEASURE IN DOING THINGS: 0
10. IF YOU CHECKED OFF ANY PROBLEMS, HOW DIFFICULT HAVE THESE PROBLEMS MADE IT FOR YOU TO DO YOUR WORK, TAKE CARE OF THINGS AT HOME, OR GET ALONG WITH OTHER PEOPLE: 0
3. TROUBLE FALLING OR STAYING ASLEEP: 0
SUM OF ALL RESPONSES TO PHQ QUESTIONS 1-9: 0
7. TROUBLE CONCENTRATING ON THINGS, SUCH AS READING THE NEWSPAPER OR WATCHING TELEVISION: 0
5. POOR APPETITE OR OVEREATING: 0
4. FEELING TIRED OR HAVING LITTLE ENERGY: 0
6. FEELING BAD ABOUT YOURSELF - OR THAT YOU ARE A FAILURE OR HAVE LET YOURSELF OR YOUR FAMILY DOWN: 0
SUM OF ALL RESPONSES TO PHQ QUESTIONS 1-9: 0
9. THOUGHTS THAT YOU WOULD BE BETTER OFF DEAD, OR OF HURTING YOURSELF: 0
SUM OF ALL RESPONSES TO PHQ QUESTIONS 1-9: 0
SUM OF ALL RESPONSES TO PHQ9 QUESTIONS 1 & 2: 0
8. MOVING OR SPEAKING SO SLOWLY THAT OTHER PEOPLE COULD HAVE NOTICED. OR THE OPPOSITE, BEING SO FIGETY OR RESTLESS THAT YOU HAVE BEEN MOVING AROUND A LOT MORE THAN USUAL: 0
SUM OF ALL RESPONSES TO PHQ QUESTIONS 1-9: 0
2. FEELING DOWN, DEPRESSED OR HOPELESS: 0

## 2023-01-27 ASSESSMENT — ENCOUNTER SYMPTOMS
ORTHOPNEA: 0
SHORTNESS OF BREATH: 0
BLURRED VISION: 0

## 2023-01-27 ASSESSMENT — SOCIAL DETERMINANTS OF HEALTH (SDOH): HOW HARD IS IT FOR YOU TO PAY FOR THE VERY BASICS LIKE FOOD, HOUSING, MEDICAL CARE, AND HEATING?: NOT HARD AT ALL

## 2023-01-27 NOTE — PROGRESS NOTES
Subjective:     Patient Nellie Churchill is a 65 y.o. female. Hypertension  This is a chronic problem. The current episode started today. The problem is unchanged. The problem is controlled. Pertinent negatives include no anxiety, blurred vision, chest pain, headaches, malaise/fatigue, neck pain, orthopnea, palpitations, peripheral edema, PND, shortness of breath or sweats. There are no associated agents to hypertension. Risk factors for coronary artery disease include diabetes mellitus and dyslipidemia. Past treatments include angiotensin blockers and diuretics.      Allergies   Allergen Reactions    Penicillins     Tetracyclines & Related Other (See Comments)     Vertigo and red eyes    Sulfa Antibiotics Rash       Current Outpatient Medications   Medication Sig Dispense Refill    Dulaglutide 4.5 MG/0.5ML SOPN Inject 4.5 mg into the skin once a week 12 Adjustable Dose Pre-filled Pen Syringe 3    losartan-hydroCHLOROthiazide (HYZAAR) 100-25 MG per tablet Take 1 tablet by mouth daily 90 tablet 3    Continuous Blood Gluc  (FREESTYLE NUVIA 2 READER) DIPTI Change every 14 days 1 each 0    metFORMIN (GLUCOPHAGE) 1000 MG tablet TAKE 1 TABLET 2 TIMES DAILYWITH FOOD (Patient taking differently: daily (with breakfast) TAKE 1 TABLET 2 TIMES DAILYWITH FOOD) 180 tablet 2    atorvastatin (LIPITOR) 20 MG tablet TAKE ONE TABLET BY MOUTH DAILY 90 tablet 3    vitamin D (ERGOCALCIFEROL) 1.25 MG (69785 UT) CAPS capsule TAKE ONE CAPSULE BY MOUTH ONCE WEEKLY 12 capsule 3    liothyronine (CYTOMEL) 5 MCG tablet TAKE 1 TABLET DAILY 90 tablet 3    SYNTHROID 125 MCG tablet TAKE 1 TABLET DAILY 90 tablet 3    diclofenac sodium (VOLTAREN) 1 % GEL APPLY ONE GRAM TOPICALLY TWO TIMES A  g 3    insulin lispro (HUMALOG) 100 UNIT/ML injection vial INJECT UP  UNITS     UNDER THE SKIN DAILY VIA   INSULIN PUMP 90 mL 3    Insulin Infusion Pump DIPTI [employer] 770G insulin pump, Guardian Sensor 3 and transmitter 1 Device 0 medical marijuana Take 1 each by mouth as needed. tiZANidine (ZANAFLEX) 2 MG tablet Take 1 tablet by mouth 3 times daily as needed (spasm) 30 tablet 2    aspirin 81 MG tablet Take 81 mg by mouth daily       No current facility-administered medications for this visit. 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    Type II or unspecified type diabetes mellitus without mention of complication, not stated as uncontrolled     Vitamin D deficiency 11/2012       Past Surgical History:   Procedure Laterality Date    BLADDER SURGERY  12/01/2016    cancerous tumor removed from bladder    COLONOSCOPY  2012    polyps    COLONOSCOPY  04/20/2018    Go-polyps    OTHER SURGICAL HISTORY  2003    neuro surgery- CTS repair    THYROIDECTOMY  92/0001    UMBILICAL HERNIA REPAIR  05/05/2016    with mesh        Social History     Socioeconomic History    Marital status:      Spouse name: Not on file    Number of children: 2    Years of education: Not on file    Highest education level: Not on file   Occupational History    Occupation: [employer]   Tobacco Use    Smoking status: Never    Smokeless tobacco: Never   Vaping Use    Vaping Use: Never used   Substance and Sexual Activity    Alcohol use:  Yes     Alcohol/week: 1.0 standard drink     Types: 1 Glasses of wine per week    Drug use: No    Sexual activity: Yes     Partners: Male   Other Topics Concern    Not on file   Social History Narrative    Walker    + seatbelts    Happily     Hobbies--none Social Determinants of Health     Financial Resource Strain: Low Risk     Difficulty of Paying Living Expenses: Not hard at all   Food Insecurity: No Food Insecurity    Worried About Running Out of Food in the Last Year: Never true    Ran Out of Food in the Last Year: Never true   Transportation Needs: Not on file   Physical Activity: Not on file   Stress: Not on file   Social Connections: Not on file   Intimate Partner Violence: Not on file   Housing Stability: Not on file       Family History   Problem Relation Age of Onset    Diabetes Mother     Alzheimer's Disease Mother     Heart Disease Father 45        several more    Heart Disease Brother 48        STENT    High Cholesterol Brother     Diabetes Brother     Breast Cancer Neg Hx     Ovarian Cancer Neg Hx        Immunization History   Administered Date(s) Administered    COVID-19, PFIZER PURPLE top, DILUTE for use, (age 15 y+), 30mcg/0.3mL 03/05/2021, 04/02/2021, 11/05/2021    Influenza Virus Vaccine 11/01/2014, 10/25/2019, 09/18/2020, 10/01/2021    Influenza Whole 11/01/2014    Influenza, FLUARIX, FLULAVAL, FLUZONE (age 10 mo+) AND AFLURIA, (age 1 y+), PF, 0.5mL 10/26/2018, 10/25/2019, 09/18/2020    Influenza, FLUCELVAX, (age 10 mo+), MDCK, PF, 0.5mL 11/09/2017, 09/15/2022    Pneumococcal Polysaccharide ([id number]) 10/06/2016    Tdap (Boostrix, Adacel) 05/05/2004, 09/21/2016    Tetanus 05/05/2004       Review of Systems  Review of Systems   Constitutional:  Negative for malaise/fatigue. Eyes:  Negative for blurred vision. Respiratory:  Negative for shortness of breath. Cardiovascular:  Negative for chest pain, palpitations, orthopnea and PND. Musculoskeletal:  Negative for neck pain. Neurological:  Negative for headaches. Objective:   Physical Exam  Physical Exam  Vitals reviewed. Constitutional:       General: She is not in acute distress. Appearance: She is well-developed. HENT:      Head: Normocephalic.       Right Ear: Tympanic membrane and ear canal normal.      Left Ear: Tympanic membrane and ear canal normal.      Nose: No rhinorrhea. Mouth/Throat:      Pharynx: No oropharyngeal exudate or posterior oropharyngeal erythema. Eyes:      General:         Right eye: No discharge. Left eye: No discharge. Conjunctiva/sclera: Conjunctivae normal.      Pupils: Pupils are equal, round, and reactive to light. Neck:      Thyroid: No thyromegaly. Vascular: No carotid bruit or JVD. Trachea: No tracheal deviation. Cardiovascular:      Rate and Rhythm: Normal rate and regular rhythm. Pulses: Normal pulses. Heart sounds: Normal heart sounds. No murmur heard. No gallop. Pulmonary:      Effort: Pulmonary effort is normal. No respiratory distress. Breath sounds: Normal breath sounds. No stridor. No wheezing or rales. Chest:      Chest wall: No tenderness. Abdominal:      General: Bowel sounds are normal. There is no distension. Palpations: Abdomen is soft. There is no mass. Tenderness: There is no abdominal tenderness. There is no right CVA tenderness, left CVA tenderness, guarding or rebound. Hernia: No hernia is present. Musculoskeletal:         General: No swelling, tenderness, deformity or signs of injury. Cervical back: Normal range of motion. No rigidity or tenderness. Right lower leg: No edema. Left lower leg: No edema. Lymphadenopathy:      Cervical: No cervical adenopathy. Skin:     General: Skin is warm and dry. Coloration: Skin is not pale. Findings: No erythema or rash. Neurological:      Mental Status: She is alert and oriented to person, place, and time. Cranial Nerves: No cranial nerve deficit. Motor: No weakness or abnormal muscle tone.       Coordination: Coordination normal.      Gait: Gait normal.      Deep Tendon Reflexes: Reflexes normal.   Psychiatric:         Mood and Affect: Mood normal.         Behavior: Behavior normal.         Thought Content:  Thought content normal.         Judgment: Judgment normal.       Assessment and Plan:     Uncontrolled type 2 diabetes mellitus with hyperglycemia (HCC)   Uncontrolled, changes made today: ^ dulaglitide to 4.5 mg weekly    Diabetes mellitus type 2, insulin dependent ([facility] [address]   see above    Essential hypertension   Uncontrolled, changes made today: ^ losartan/hctz to 100/25--recheck in 6 w

## 2023-03-10 ENCOUNTER — OFFICE VISIT (OUTPATIENT)
Dept: FAMILY MEDICINE CLINIC | Age: 63
End: 2023-03-10
Payer: COMMERCIAL

## 2023-03-10 VITALS
DIASTOLIC BLOOD PRESSURE: 80 MMHG | WEIGHT: 220.4 LBS | HEIGHT: 63 IN | OXYGEN SATURATION: 99 % | BODY MASS INDEX: 39.05 KG/M2 | HEART RATE: 70 BPM | SYSTOLIC BLOOD PRESSURE: 120 MMHG

## 2023-03-10 DIAGNOSIS — C67.3 MALIGNANT NEOPLASM OF ANTERIOR WALL OF URINARY BLADDER (HCC): ICD-10-CM

## 2023-03-10 DIAGNOSIS — E66.01 SEVERE OBESITY (BMI 35.0-39.9) WITH COMORBIDITY (HCC): ICD-10-CM

## 2023-03-10 DIAGNOSIS — I10 ESSENTIAL HYPERTENSION: ICD-10-CM

## 2023-03-10 PROCEDURE — 3079F DIAST BP 80-89 MM HG: CPT | Performed by: FAMILY MEDICINE

## 2023-03-10 PROCEDURE — 99213 OFFICE O/P EST LOW 20 MIN: CPT | Performed by: FAMILY MEDICINE

## 2023-03-10 PROCEDURE — 3074F SYST BP LT 130 MM HG: CPT | Performed by: FAMILY MEDICINE

## 2023-03-10 SDOH — ECONOMIC STABILITY: HOUSING INSECURITY
IN THE LAST 12 MONTHS, WAS THERE A TIME WHEN YOU DID NOT HAVE A STEADY PLACE TO SLEEP OR SLEPT IN A SHELTER (INCLUDING NOW)?: NO

## 2023-03-10 SDOH — ECONOMIC STABILITY: FOOD INSECURITY: WITHIN THE PAST 12 MONTHS, THE FOOD YOU BOUGHT JUST DIDN'T LAST AND YOU DIDN'T HAVE MONEY TO GET MORE.: NEVER TRUE

## 2023-03-10 SDOH — ECONOMIC STABILITY: FOOD INSECURITY: WITHIN THE PAST 12 MONTHS, YOU WORRIED THAT YOUR FOOD WOULD RUN OUT BEFORE YOU GOT MONEY TO BUY MORE.: NEVER TRUE

## 2023-03-10 SDOH — ECONOMIC STABILITY: INCOME INSECURITY: HOW HARD IS IT FOR YOU TO PAY FOR THE VERY BASICS LIKE FOOD, HOUSING, MEDICAL CARE, AND HEATING?: NOT HARD AT ALL

## 2023-03-10 ASSESSMENT — PATIENT HEALTH QUESTIONNAIRE - PHQ9
2. FEELING DOWN, DEPRESSED OR HOPELESS: 0
8. MOVING OR SPEAKING SO SLOWLY THAT OTHER PEOPLE COULD HAVE NOTICED. OR THE OPPOSITE, BEING SO FIGETY OR RESTLESS THAT YOU HAVE BEEN MOVING AROUND A LOT MORE THAN USUAL: 0
5. POOR APPETITE OR OVEREATING: 0
6. FEELING BAD ABOUT YOURSELF - OR THAT YOU ARE A FAILURE OR HAVE LET YOURSELF OR YOUR FAMILY DOWN: 0
7. TROUBLE CONCENTRATING ON THINGS, SUCH AS READING THE NEWSPAPER OR WATCHING TELEVISION: 0
4. FEELING TIRED OR HAVING LITTLE ENERGY: 0
SUM OF ALL RESPONSES TO PHQ QUESTIONS 1-9: 0
9. THOUGHTS THAT YOU WOULD BE BETTER OFF DEAD, OR OF HURTING YOURSELF: 0
SUM OF ALL RESPONSES TO PHQ QUESTIONS 1-9: 0
SUM OF ALL RESPONSES TO PHQ QUESTIONS 1-9: 0
10. IF YOU CHECKED OFF ANY PROBLEMS, HOW DIFFICULT HAVE THESE PROBLEMS MADE IT FOR YOU TO DO YOUR WORK, TAKE CARE OF THINGS AT HOME, OR GET ALONG WITH OTHER PEOPLE: 0
SUM OF ALL RESPONSES TO PHQ QUESTIONS 1-9: 0
3. TROUBLE FALLING OR STAYING ASLEEP: 0
1. LITTLE INTEREST OR PLEASURE IN DOING THINGS: 0
SUM OF ALL RESPONSES TO PHQ9 QUESTIONS 1 & 2: 0

## 2023-03-10 ASSESSMENT — ENCOUNTER SYMPTOMS
ORTHOPNEA: 0
BLURRED VISION: 0
SHORTNESS OF BREATH: 0

## 2023-03-10 NOTE — PROGRESS NOTES
Subjective:     Patient Tk Tidwell is a 65 y.o. female. Hypertension  This is a chronic problem. The current episode started more than 1 year ago. The problem is unchanged. The problem is controlled. Pertinent negatives include no anxiety, blurred vision, chest pain, headaches, malaise/fatigue, neck pain, orthopnea, palpitations, peripheral edema, PND, shortness of breath or sweats. There are no associated agents to hypertension. There are no known risk factors for coronary artery disease. Past treatments include angiotensin blockers and diuretics. The current treatment provides significant improvement. There are no compliance problems. There is no history of angina, kidney disease, CAD/MI, CVA, heart failure, left ventricular hypertrophy, PVD or retinopathy. There is no history of chronic renal disease, coarctation of the aorta, hyperaldosteronism, hypercortisolism, hyperparathyroidism, a hypertension causing med, pheochromocytoma, renovascular disease, sleep apnea or a thyroid problem.      Allergies   Allergen Reactions    Penicillins     Tetracyclines & Related Other (See Comments)     Vertigo and red eyes    Sulfa Antibiotics Rash       Current Outpatient Medications   Medication Sig Dispense Refill    metroNIDAZOLE (METROCREAM) 0.75 % cream APPLY TO AFFECTED AREA(S) TWO TIMES A DAY 45 g 4    Dulaglutide 4.5 MG/0.5ML SOPN Inject 4.5 mg into the skin once a week 12 Adjustable Dose Pre-filled Pen Syringe 3    losartan-hydroCHLOROthiazide (HYZAAR) 100-25 MG per tablet Take 1 tablet by mouth daily 90 tablet 3    Continuous Blood Gluc  (FREESTYLE NUVIA 2 READER) DIPTI Change every 14 days 1 each 0    metFORMIN (GLUCOPHAGE) 1000 MG tablet TAKE 1 TABLET 2 TIMES DAILYWITH FOOD (Patient taking differently: daily (with breakfast) TAKE 1 TABLET 2 TIMES DAILYWITH FOOD) 180 tablet 2    atorvastatin (LIPITOR) 20 MG tablet TAKE ONE TABLET BY MOUTH DAILY 90 tablet 3    vitamin D (ERGOCALCIFEROL) 1.25 MG (93924 UT) CAPS capsule TAKE ONE CAPSULE BY MOUTH ONCE WEEKLY 12 capsule 3    liothyronine (CYTOMEL) 5 MCG tablet TAKE 1 TABLET DAILY 90 tablet 3    SYNTHROID 125 MCG tablet TAKE 1 TABLET DAILY 90 tablet 3    diclofenac sodium (VOLTAREN) 1 % GEL APPLY ONE GRAM TOPICALLY TWO TIMES A  g 3    insulin lispro (HUMALOG) 100 UNIT/ML injection vial INJECT UP  UNITS     UNDER THE SKIN DAILY VIA   INSULIN PUMP 90 mL 3    Insulin Infusion Pump DIPTI Med[employer]onic 770G insulin pump, Guardian Sensor 3 and transmitter 1 Device 0    medical marijuana Take 1 each by mouth as needed. tiZANidine (ZANAFLEX) 2 MG tablet Take 1 tablet by mouth 3 times daily as needed (spasm) 30 tablet 2    aspirin 81 MG tablet Take 81 mg by mouth daily       No current facility-administered medications for this visit.        Past Medical History:   Diagnosis Date    Cystic acne     Diverticulitis large intestine 7/7/2010    Diverticulosis of colon     Episcleritis     HTN (hypertension) 12/6/2012    Hyperemesis gravidarum     Hyperlipidemia     Hyperplastic polyp of transverse colon 12/28/2022    Hyperthyroidism     Malignant neoplasm of anterior wall of urinary bladder ([facility] [address] 1/13/2017    Pregnancies     2/   2-vaginal deliveries    Thyroid follicular adenoma 61/8968    Hurtle Cell adenoma    Type II or unspecified type diabetes mellitus without mention of complication, not stated as uncontrolled     Vitamin D deficiency 11/2012       Past Surgical History:   Procedure Laterality Date    BLADDER SURGERY  12/01/2016    cancerous tumor removed from bladder    COLONOSCOPY  2012    polyps    COLONOSCOPY  04/20/2018    Go-polyps    OTHER SURGICAL HISTORY  2003    neuro surgery- CTS repair    THYROIDECTOMY  90/6820    UMBILICAL HERNIA REPAIR  05/05/2016    with mesh        Social History     Socioeconomic History    Marital status:      Spouse name: Not on file    Number of children: 2    Years of education: Not on file    Highest education level: Not on file   Occupational History    Occupation: [employer]   Tobacco Use    Smoking status: Never    Smokeless tobacco: Never   Vaping Use    Vaping Use: Never used   Substance and Sexual Activity    Alcohol use: Yes     Alcohol/week: 1.0 standard drink     Types: 1 Glasses of wine per week    Drug use: No    Sexual activity: Yes     Partners: Male   Other Topics Concern    Not on file   Social History Narrative    [employer]    + seatbelts    Happily     Hobbies--none     Social Determinants of Health     Financial Resource Strain: Low Risk     Difficulty of Paying Living Expenses: Not hard at all   Food Insecurity: No Food Insecurity    Worried About [address] in the Last Year: Never true    [address] in the Last Year: Never true   Transportation Needs: Unknown    Lack of Transportation (Medical): Not on file    Lack of Transportation (Non-Medical):  No   Physical Activity: Not on file   Stress: Not on file   Social Connections: Not on file   Intimate Partner Violence: Not on file   Housing Stability: Unknown    Unable to Pay for Housing in the Last Year: Not on file    Number of Places Lived in the Last Year: Not on file    Unstable Housing in the Last Year: No       Family History   Problem Relation Age of Onset    Diabetes Mother     Alzheimer's Disease Mother     Heart Disease Father 45        several more    Heart Disease Brother 48        STENT    High Cholesterol Brother     Diabetes Brother     Breast Cancer Neg Hx     Ovarian Cancer Neg Hx        Immunization History   Administered Date(s) Administered    COVID-19, PFIZER PURPLE top, DILUTE for use, (age 15 y+), 30mcg/0.3mL 03/05/2021, 04/02/2021, 11/05/2021    Influenza Virus Vaccine 11/01/2014, 10/25/2019, 09/18/2020, 10/01/2021    Influenza Whole 11/01/2014    Influenza, FLUARIX, FLULAVAL, FLUZONE (age 10 mo+) AND AFLURIA, (age 1 y+), PF, 0.5mL 10/26/2018, 10/25/2019, 09/18/2020    Influenza, FLUCELVAX, (age 10 mo+), MDCK, PF, 0.5mL 11/09/2017, 09/15/2022    Pneumococcal Polysaccharide ([id number]) 10/06/2016    Tdap (Boostrix, Adacel) 05/05/2004, 09/21/2016    Tetanus 05/05/2004       Review of Systems  Review of Systems   Constitutional:  Negative for malaise/fatigue. Eyes:  Negative for blurred vision. Respiratory:  Negative for shortness of breath. Cardiovascular:  Negative for chest pain, palpitations, orthopnea and PND. Musculoskeletal:  Negative for neck pain. Neurological:  Negative for headaches. Objective:   Physical Exam  Physical Exam  Vitals reviewed. Constitutional:       General: She is not in acute distress. Appearance: She is well-developed. HENT:      Head: Normocephalic. Right Ear: Tympanic membrane and ear canal normal.      Left Ear: Tympanic membrane and ear canal normal.      Nose: No rhinorrhea. Mouth/Throat:      Pharynx: No oropharyngeal exudate or posterior oropharyngeal erythema. Eyes:      General:         Right eye: No discharge. Left eye: No discharge. Conjunctiva/sclera: Conjunctivae normal.      Pupils: Pupils are equal, round, and reactive to light. Neck:      Thyroid: No thyromegaly. Vascular: No carotid bruit or JVD. Trachea: No tracheal deviation. Cardiovascular:      Rate and Rhythm: Normal rate and regular rhythm. Pulses: Normal pulses. Heart sounds: Normal heart sounds. No murmur heard. No gallop. Pulmonary:      Effort: Pulmonary effort is normal. No respiratory distress. Breath sounds: Normal breath sounds. No stridor. No wheezing or rales. Chest:      Chest wall: No tenderness. Abdominal:      General: Bowel sounds are normal. There is no distension. Palpations: Abdomen is soft. There is no mass. Tenderness: There is no abdominal tenderness. There is no right CVA tenderness, left CVA tenderness, guarding or rebound. Hernia: No hernia is present. Musculoskeletal:         General: No swelling, tenderness, deformity or signs of injury. Cervical back: Normal range of motion. No rigidity or tenderness. Right lower leg: No edema. Left lower leg: No edema. Lymphadenopathy:      Cervical: No cervical adenopathy. Skin:     General: Skin is warm and dry. Coloration: Skin is not pale. Findings: No erythema or rash. Neurological:      Mental Status: She is alert and oriented to person, place, and time. Cranial Nerves: No cranial nerve deficit. Motor: No weakness or abnormal muscle tone. Coordination: Coordination normal.      Gait: Gait normal.      Deep Tendon Reflexes: Reflexes normal.   Psychiatric:         Mood and Affect: Mood normal.         Behavior: Behavior normal.         Thought Content:  Thought content normal.         Judgment: Judgment normal.       Assessment and Plan:     Essential hypertension   Well-controlled, continue current medications    Malignant neoplasm of anterior wall of urinary bladder ([facility] [address]   Monitored by specialist- no acute findings meriting change in the plan

## 2023-03-28 ENCOUNTER — OFFICE VISIT (OUTPATIENT)
Dept: ENDOCRINOLOGY | Age: 63
End: 2023-03-28
Payer: COMMERCIAL

## 2023-03-28 ENCOUNTER — OFFICE VISIT (OUTPATIENT)
Dept: DERMATOLOGY | Age: 63
End: 2023-03-28
Payer: COMMERCIAL

## 2023-03-28 VITALS
RESPIRATION RATE: 14 BRPM | HEIGHT: 63 IN | BODY MASS INDEX: 39.44 KG/M2 | SYSTOLIC BLOOD PRESSURE: 138 MMHG | OXYGEN SATURATION: 98 % | TEMPERATURE: 97 F | HEART RATE: 69 BPM | WEIGHT: 222.6 LBS | DIASTOLIC BLOOD PRESSURE: 71 MMHG

## 2023-03-28 DIAGNOSIS — E11.65 UNCONTROLLED TYPE 2 DIABETES MELLITUS WITH HYPERGLYCEMIA (HCC): Primary | ICD-10-CM

## 2023-03-28 DIAGNOSIS — Z96.41 INSULIN PUMP IN PLACE: ICD-10-CM

## 2023-03-28 DIAGNOSIS — L71.8 OTHER ROSACEA: Primary | ICD-10-CM

## 2023-03-28 DIAGNOSIS — L70.0 ACNE VULGARIS: ICD-10-CM

## 2023-03-28 LAB — HBA1C MFR BLD: 7.1 %

## 2023-03-28 PROCEDURE — 3075F SYST BP GE 130 - 139MM HG: CPT | Performed by: INTERNAL MEDICINE

## 2023-03-28 PROCEDURE — 99204 OFFICE O/P NEW MOD 45 MIN: CPT | Performed by: INTERNAL MEDICINE

## 2023-03-28 PROCEDURE — 83036 HEMOGLOBIN GLYCOSYLATED A1C: CPT | Performed by: INTERNAL MEDICINE

## 2023-03-28 PROCEDURE — 3078F DIAST BP <80 MM HG: CPT | Performed by: INTERNAL MEDICINE

## 2023-03-28 PROCEDURE — 99214 OFFICE O/P EST MOD 30 MIN: CPT | Performed by: INTERNAL MEDICINE

## 2023-03-28 RX ORDER — DOXYCYCLINE HYCLATE 100 MG
TABLET ORAL
Qty: 60 TABLET | Refills: 2 | Status: SHIPPED | OUTPATIENT
Start: 2023-03-28

## 2023-03-28 RX ORDER — METRONIDAZOLE 10 MG/G
GEL TOPICAL
Qty: 60 G | Refills: 3 | Status: SHIPPED | OUTPATIENT
Start: 2023-03-28

## 2023-03-28 RX ORDER — AZELAIC ACID 0.15 G/G
GEL TOPICAL
Qty: 50 G | Refills: 2 | Status: SHIPPED | OUTPATIENT
Start: 2023-03-28

## 2023-03-28 NOTE — PATIENT INSTRUCTIONS
Thank you for visiting [facility] today!  Please follow the instructions below as we discussed in clinic:      Start metronidazole gel daily to entire face   Start doxycycline 100mg twice a day for 3 months  Start Azelaic acid daily

## 2023-03-28 NOTE — LETTER
Patient name: Peg Ebbs  : 1960    To whom it may concern:    I am the dermatologist caring for Peg Ebbs. You all have recently denied her access to azelaic acid 15% gel. Les Lewis has severe cystic acne rosacea over her entire face. She has failed treatment with Accutane, doxycycline, minocycline, and metronidazole gel. She requires use of this medication.

## 2023-03-28 NOTE — PROGRESS NOTES
[facility]  Domingo Nicholson MD  [phone]    Date of Visit: 3/28/2023    Maine Arvizu is a 65 y.o. female who presents for rosacea. New pt    Chief Complaint:   Chief Complaint   Patient presents with    Other     Inflammatory rosacea        History of Present Illness:    Concern:  Rosacea + acne rosacea  Location: Face  Duration:  Age 14-->cystic acne at age 30  Symptoms: Painful acne bumps + redness  Previous treatments:    -Pill steroids  -Doxycycline--Was on this for 10 years stopped 2/2 \"dry/red eyes\" toward end of 10 years, but not sure if this was from doxy. No anaphylaxis sx  -Accutane course in late 20s x 6 months--acne wasn't clear when she finished, but had to stop 2/2 joint pain, weight gain  -Minocycline--gave her vertigo, n/v  Current treatments:   -Metro gel BID  Effect of current treatment: Not controlled    Review of Systems:  Gen: Feels well, good sense of health. Past Medical History, Family History, Surgical History, Medications and Allergies reviewed.     Past Medical History:   Diagnosis Date    Cystic acne     Diverticulitis large intestine 7/7/2010    Diverticulosis of colon     Episcleritis     HTN (hypertension) 12/6/2012    Hyperemesis gravidarum     Hyperlipidemia     Hyperplastic polyp of transverse colon 12/28/2022    Hyperthyroidism     Malignant neoplasm of anterior wall of urinary bladder ([facility] [address] 1/13/2017    Pregnancies     2/   2-vaginal deliveries    Thyroid follicular adenoma 58/6953    Hurtle Cell adenoma    Type II or unspecified type diabetes mellitus without mention of complication, not stated as uncontrolled     Vitamin D deficiency 11/2012     Past Surgical History:   Procedure Laterality Date    BLADDER SURGERY  12/01/2016    cancerous tumor removed from bladder    COLONOSCOPY  2012    polyps    COLONOSCOPY  04/20/2018    Go-polyps    OTHER SURGICAL HISTORY  2003    neuro surgery- CTS repair    THYROIDECTOMY  84/3233    UMBILICAL HERNIA REPAIR

## 2023-03-28 NOTE — PROGRESS NOTES
Seen as  patient for diabetes      Interim:    States glucose higher  Putting in extra CHO to get more insulin  Using freestyle now    She has medtronic sensor  Had some issues  Does not like it    Diagnosed with Type 2 diabetes mellitus in  2005  Known diabetic complications: none   Uncontrolled, moderate    Current diabetic medications     Metformin 1gm BID  jardiance  Off of it due to yeast infection  Trulicity 4.5 mg    Insulin pump   MN   3.2  6 am 2.0  5:30 pm 2.65    I:C  5.5  CF 22  Target 110-120    Last A1c  7.1%<-----8% <-----7.4%<---7%<-----7.1%<---- 6.4%<--- 6.6%<----- 6.8% < ---- 7%<----6.8%<---- 8.2%<----7.3%<-----   7.6%<--- 7.9 <--- -8.3    Moderate, controlled    Prior visit with dietician: no  Current diet: on average, 3 meals per day   Current exercise: walking   Current monitoring regimen: home blood tests - 1 times daily     Has brought blood glucose log/meter:yes  Home blood sugar records:   149-203  Any episodes of hypoglycemia?  No recent    No Hx of CAD , PVD, CVA    Hyperlipidemia:   For   Years  Takes lipitor 20mg  Controlled  LDL 76 on 3/19   LDL 78 on 10/20    Hypertension for years  Stable  Takes  Losartan 100mg    Last eye exam: 2021  Last foot exam: 8/21  Last microalbumin to creatinine ratio:  12/22    She has a h/o hypothyroidism    H/o QUIJANO for hyperthyroidism    S/p Thyroidectomy for thyroid Nodule: had atypical cells on FNA and patient had surgery with Dr Christine Oakes in 2/2013, normal path, Hurtle cell adenoma;     TSH 2.68 on 3/19  TSH 1.84 on 9/20  TSH 1.0 on 10/21    Synthroid 125mcg cytomel 5mcg daily    She is taking Vit D 50,000 IU , weekly, needs level checked    Past Medical History:   Diagnosis Date    Cystic acne     Diverticulitis large intestine 7/7/2010    Diverticulosis of colon     Episcleritis     HTN (hypertension) 12/6/2012    Hyperemesis gravidarum     Hyperlipidemia     Hyperplastic polyp of transverse colon 12/28/2022    Hyperthyroidism     Malignant neoplasm of

## 2023-04-01 RX ORDER — ERGOCALCIFEROL 1.25 MG/1
CAPSULE ORAL
Qty: 12 CAPSULE | Refills: 3 | Status: SHIPPED | OUTPATIENT
Start: 2023-04-01

## 2023-04-03 ENCOUNTER — TELEPHONE (OUTPATIENT)
Dept: FAMILY MEDICINE CLINIC | Age: 63
End: 2023-04-03

## 2023-04-03 NOTE — TELEPHONE ENCOUNTER
Talked to patient -  She has 3 weeks of 4.5 dose. And 2 months of 3.0 dose. Saw endocrinologist last week - he had adjusted her pump and she's had some low sugars. She'll check with him about taking the 3.0 dose. She'll let us know if she needs some sent locally.

## 2023-04-06 NOTE — TELEPHONE ENCOUNTER
Medication:   Requested Prescriptions     Pending Prescriptions Disp Refills    Continuous Blood Gluc  (FREESTYLE NUVIA 2 READER) [address] [Pharmacy Med Name: Ana Lilia Pierre 2 READER]       Sig: CHANGE EVERY 90 DAYS       Last Filled:      Patient Phone Number: [phone] (home)     Last appt: 3/28/2023   Next appt: 7/18/2023    Last Labs DM:   Lab Results   Component Value Date/Time    LABA1C 7.1 03/28/2023 04:35 PM

## 2023-05-01 RX ORDER — LEVOTHYROXINE SODIUM 125 MCG
TABLET ORAL
Qty: 90 TABLET | Refills: 3 | Status: SHIPPED | OUTPATIENT
Start: 2023-05-01

## 2023-06-28 ENCOUNTER — OFFICE VISIT (OUTPATIENT)
Dept: DERMATOLOGY | Age: 63
End: 2023-06-28
Payer: COMMERCIAL

## 2023-06-28 DIAGNOSIS — L70.0 ACNE VULGARIS: ICD-10-CM

## 2023-06-28 DIAGNOSIS — L71.8 OTHER ROSACEA: ICD-10-CM

## 2023-06-28 PROCEDURE — 99213 OFFICE O/P EST LOW 20 MIN: CPT | Performed by: INTERNAL MEDICINE

## 2023-06-28 RX ORDER — AZELAIC ACID 0.15 G/G
GEL TOPICAL
Qty: 50 G | Refills: 3 | Status: SHIPPED | OUTPATIENT
Start: 2023-06-28

## 2023-06-28 RX ORDER — DOXYCYCLINE HYCLATE 100 MG
TABLET ORAL
Qty: 30 TABLET | Refills: 4 | Status: SHIPPED | OUTPATIENT
Start: 2023-06-28

## 2023-06-28 RX ORDER — METRONIDAZOLE 10 MG/G
GEL TOPICAL
Qty: 60 G | Refills: 11 | Status: SHIPPED | OUTPATIENT
Start: 2023-06-28

## 2023-07-24 ENCOUNTER — OFFICE VISIT (OUTPATIENT)
Dept: ENDOCRINOLOGY | Age: 63
End: 2023-07-24
Payer: COMMERCIAL

## 2023-07-24 VITALS
BODY MASS INDEX: 39.44 KG/M2 | OXYGEN SATURATION: 98 % | HEIGHT: 63 IN | HEART RATE: 96 BPM | RESPIRATION RATE: 15 BRPM | TEMPERATURE: 98 F | WEIGHT: 222.6 LBS | SYSTOLIC BLOOD PRESSURE: 127 MMHG | DIASTOLIC BLOOD PRESSURE: 87 MMHG

## 2023-07-24 DIAGNOSIS — E11.65 UNCONTROLLED TYPE 2 DIABETES MELLITUS WITH HYPERGLYCEMIA (HCC): ICD-10-CM

## 2023-07-24 DIAGNOSIS — E11.65 UNCONTROLLED TYPE 2 DIABETES MELLITUS WITH HYPERGLYCEMIA (HCC): Primary | ICD-10-CM

## 2023-07-24 PROCEDURE — 99214 OFFICE O/P EST MOD 30 MIN: CPT | Performed by: INTERNAL MEDICINE

## 2023-07-24 PROCEDURE — 3051F HG A1C>EQUAL 7.0%<8.0%: CPT | Performed by: INTERNAL MEDICINE

## 2023-07-24 PROCEDURE — 3074F SYST BP LT 130 MM HG: CPT | Performed by: INTERNAL MEDICINE

## 2023-07-24 PROCEDURE — 3079F DIAST BP 80-89 MM HG: CPT | Performed by: INTERNAL MEDICINE

## 2023-07-24 RX ORDER — INSULIN GLARGINE 100 [IU]/ML
INJECTION, SOLUTION SUBCUTANEOUS
Qty: 1 ADJUSTABLE DOSE PRE-FILLED PEN SYRINGE | Refills: 3 | Status: SHIPPED | OUTPATIENT
Start: 2023-07-24

## 2023-07-24 RX ORDER — DULAGLUTIDE 3 MG/.5ML
INJECTION, SOLUTION SUBCUTANEOUS
Qty: 13 ML | Refills: 3 | Status: SHIPPED | OUTPATIENT
Start: 2023-07-24

## 2023-07-24 NOTE — PROGRESS NOTES
on insulin pump and oral medication. Reviewed log,fasting high. Needs CHO restriction to 45 gram per meal as eating 80 gram at times. Great response to trulicity. Fasting high, increase basal rate  Nausea better on 3mg trulicity  2. HTN : Blood pressure  at goal  3. HLD: LDL at goal  4. Hypothyroidism: TSH at goal  5. Insulin pump in place: No failures  6. Vitamin D insufficiency:  Last level normal, on weekly dose now, She is taking  IU  7. Back pain:  She takes marijuana for it    Plan:     basal as:     MN   3.6---> 3.8  6 am 2.3  5:30 pm 2.65    I:C   4.0     Advise to check blood sugar 4 times a day   Patient to send blood sugar log for titration. Advise to exercise regularly. Advise to low simple carbohydrate and protein with each  meal diet. Diabetes Care: recommend yearly eye exam, foot exam and urine microalbumin to   creatinine ratio.

## 2023-07-25 LAB
EST. AVERAGE GLUCOSE BLD GHB EST-MCNC: 191.5 MG/DL
HBA1C MFR BLD: 8.3 %

## 2023-08-02 RX ORDER — LIOTHYRONINE SODIUM 5 UG/1
TABLET ORAL
Qty: 90 TABLET | Refills: 3 | Status: SHIPPED | OUTPATIENT
Start: 2023-08-02

## 2023-08-31 RX ORDER — INSULIN LISPRO 100 [IU]/ML
INJECTION, SOLUTION INTRAVENOUS; SUBCUTANEOUS
Qty: 90 ML | Refills: 3 | Status: SHIPPED | OUTPATIENT
Start: 2023-08-31

## 2023-08-31 NOTE — TELEPHONE ENCOUNTER
Medication:   Requested Prescriptions     Pending Prescriptions Disp Refills    insulin lispro (HUMALOG) 100 UNIT/ML SOLN injection vial [Pharmacy Med Name: Kerry Sutton 100U/ML] 90 mL 3     Sig: INJECT UP  UNITS     UNDER THE SKIN DAILY VIA   INSULIN PUMP       Last Filled:      Patient Phone Number: [phone] (home)     Last appt: 7/24/2023   Next appt: 11/7/2023    Last Labs DM:   Lab Results   Component Value Date/Time    LABA1C 8.3 07/24/2023 10:49 AM

## 2023-09-18 ENCOUNTER — TELEPHONE (OUTPATIENT)
Dept: ENDOCRINOLOGY | Age: 63
End: 2023-09-18

## 2023-09-18 NOTE — TELEPHONE ENCOUNTER
Patient called stating she had to get a new pump and does not remember her settings. Please advise for settings. Patient would also need help from [employer] to add the settings back into her new pump. She does have enough long acting insulin for now.

## 2023-10-31 NOTE — TELEPHONE ENCOUNTER
Medication:   Requested Prescriptions     Pending Prescriptions Disp Refills    Continuous Blood Gluc Sensor (FREESTYLE NUVIA 2 SENSOR) MISC [Pharmacy Med Name: Bonita Sifuentes 2 SENSOR] 6 each 3     Sig: REPLACE EVERY 2 WEEKS       Last Filled:      Patient Phone Number: [phone] (home)     Last appt: 7/24/2023   Next appt: 11/7/2023    Last Labs DM:   Lab Results   Component Value Date/Time    LABA1C 8.3 07/24/2023 10:49 AM

## 2023-11-07 ENCOUNTER — OFFICE VISIT (OUTPATIENT)
Dept: ENDOCRINOLOGY | Age: 63
End: 2023-11-07
Payer: COMMERCIAL

## 2023-11-07 VITALS
HEART RATE: 76 BPM | RESPIRATION RATE: 15 BRPM | TEMPERATURE: 98 F | BODY MASS INDEX: 39.48 KG/M2 | DIASTOLIC BLOOD PRESSURE: 88 MMHG | OXYGEN SATURATION: 99 % | HEIGHT: 63 IN | SYSTOLIC BLOOD PRESSURE: 131 MMHG | WEIGHT: 222.8 LBS

## 2023-11-07 DIAGNOSIS — E11.65 UNCONTROLLED TYPE 2 DIABETES MELLITUS WITH HYPERGLYCEMIA (HCC): Primary | ICD-10-CM

## 2023-11-07 LAB — HBA1C MFR BLD: 7.9 %

## 2023-11-07 PROCEDURE — 3079F DIAST BP 80-89 MM HG: CPT | Performed by: INTERNAL MEDICINE

## 2023-11-07 PROCEDURE — 3075F SYST BP GE 130 - 139MM HG: CPT | Performed by: INTERNAL MEDICINE

## 2023-11-07 PROCEDURE — 99214 OFFICE O/P EST MOD 30 MIN: CPT | Performed by: INTERNAL MEDICINE

## 2023-11-07 PROCEDURE — 83036 HEMOGLOBIN GLYCOSYLATED A1C: CPT | Performed by: INTERNAL MEDICINE

## 2023-11-07 PROCEDURE — 3051F HG A1C>EQUAL 7.0%<8.0%: CPT | Performed by: INTERNAL MEDICINE

## 2023-11-07 NOTE — PROGRESS NOTES
LDLCALC 89 10/18/2021    LDLCALC 78 10/19/2020     Lab Results   Component Value Date    LABVLDL 29 12/28/2022    LABVLDL 19 10/18/2021    LABVLDL 28 10/19/2020     Lab Results   Component Value Date    LABA1C 8.3 07/24/2023       Assessment:     Nyla Lyons is a 65 y.o. female with :    1.T2DM: Longstanding, fair control, on insulin pump and oral medication. Reviewed log,fasting high. Needs CHO restriction to 45 gram per meal as eating 80 gram at times. Great response to trulicity. Fasting high, increase basal rate. She does have some post meal high, adjust I:C  Nausea better on 3mg trulicity  Check guardian 4  Restart jardiance and assess if tolerates as helped glucose control in the past  2. HTN : Blood pressure  at goal  3. HLD: LDL at goal  4. Hypothyroidism: TSH at goal  5. Insulin pump in place: No failures  6. Vitamin D insufficiency:  Last level normal, on weekly dose now, She is taking  IU  7. Back pain:  She takes marijuana for it    Plan:     basal as:     MN   3.8--->4.2  6 am 2.3--->2.5  5:30 pm 2.65--->2.8    I:C   4.0--->3.5     Advise to check blood sugar 4 times a day   Patient to send blood sugar log for titration. Advise to exercise regularly. Advise to low simple carbohydrate and protein with each  meal diet. Diabetes Care: recommend yearly eye exam, foot exam and urine microalbumin to   creatinine ratio.

## 2023-11-14 ENCOUNTER — OFFICE VISIT (OUTPATIENT)
Dept: FAMILY MEDICINE CLINIC | Age: 63
End: 2023-11-14
Payer: COMMERCIAL

## 2023-11-14 VITALS
WEIGHT: 219.4 LBS | HEART RATE: 72 BPM | HEIGHT: 63 IN | BODY MASS INDEX: 38.88 KG/M2 | OXYGEN SATURATION: 97 % | SYSTOLIC BLOOD PRESSURE: 130 MMHG | DIASTOLIC BLOOD PRESSURE: 88 MMHG

## 2023-11-14 DIAGNOSIS — M25.50 ARTHRALGIA, UNSPECIFIED JOINT: ICD-10-CM

## 2023-11-14 DIAGNOSIS — R41.89 BRAIN FOG: ICD-10-CM

## 2023-11-14 DIAGNOSIS — R53.83 OTHER FATIGUE: ICD-10-CM

## 2023-11-14 DIAGNOSIS — E89.0 POSTOPERATIVE HYPOTHYROIDISM: ICD-10-CM

## 2023-11-14 DIAGNOSIS — I10 ESSENTIAL HYPERTENSION: ICD-10-CM

## 2023-11-14 LAB
ALBUMIN SERPL-MCNC: 5.3 G/DL (ref 3.4–5)
ALBUMIN/GLOB SERPL: 2.3 {RATIO} (ref 1.1–2.2)
ALP SERPL-CCNC: 86 U/L (ref 40–129)
ALT SERPL-CCNC: 24 U/L (ref 10–40)
ANION GAP SERPL CALCULATED.3IONS-SCNC: 16 MMOL/L (ref 3–16)
AST SERPL-CCNC: 17 U/L (ref 15–37)
BASOPHILS # BLD: 0 K/UL (ref 0–0.2)
BASOPHILS NFR BLD: 0.4 %
BILIRUB SERPL-MCNC: 0.6 MG/DL (ref 0–1)
BUN SERPL-MCNC: 22 MG/DL (ref 7–20)
CALCIUM SERPL-MCNC: 10.3 MG/DL (ref 8.3–10.6)
CHLORIDE SERPL-SCNC: 100 MMOL/L (ref 99–110)
CO2 SERPL-SCNC: 27 MMOL/L (ref 21–32)
CREAT SERPL-MCNC: 0.8 MG/DL (ref 0.6–1.2)
CRP SERPL-MCNC: <3 MG/L (ref 0–5.1)
DEPRECATED RDW RBC AUTO: 14 % (ref 12.4–15.4)
EOSINOPHIL # BLD: 0.2 K/UL (ref 0–0.6)
EOSINOPHIL NFR BLD: 2.2 %
GFR SERPLBLD CREATININE-BSD FMLA CKD-EPI: >60 ML/MIN/{1.73_M2}
GLUCOSE SERPL-MCNC: 141 MG/DL (ref 70–99)
HCT VFR BLD AUTO: 42 % (ref 36–48)
HGB BLD-MCNC: 14.2 G/DL (ref 12–16)
LYMPHOCYTES # BLD: 1.7 K/UL (ref 1–5.1)
LYMPHOCYTES NFR BLD: 22.1 %
MCH RBC QN AUTO: 29.3 PG (ref 26–34)
MCHC RBC AUTO-ENTMCNC: 33.7 G/DL (ref 31–36)
MCV RBC AUTO: 87 FL (ref 80–100)
MONOCYTES # BLD: 0.6 K/UL (ref 0–1.3)
MONOCYTES NFR BLD: 8.1 %
NEUTROPHILS # BLD: 5.1 K/UL (ref 1.7–7.7)
NEUTROPHILS NFR BLD: 67.2 %
PLATELET # BLD AUTO: 311 K/UL (ref 135–450)
PMV BLD AUTO: 8.7 FL (ref 5–10.5)
POTASSIUM SERPL-SCNC: 4.1 MMOL/L (ref 3.5–5.1)
PROT SERPL-MCNC: 7.6 G/DL (ref 6.4–8.2)
RBC # BLD AUTO: 4.83 M/UL (ref 4–5.2)
SODIUM SERPL-SCNC: 143 MMOL/L (ref 136–145)
T4 FREE SERPL-MCNC: 1.6 NG/DL (ref 0.9–1.8)
TSH SERPL DL<=0.005 MIU/L-ACNC: 0.33 UIU/ML (ref 0.27–4.2)
WBC # BLD AUTO: 7.7 K/UL (ref 4–11)

## 2023-11-14 PROCEDURE — 3079F DIAST BP 80-89 MM HG: CPT | Performed by: FAMILY MEDICINE

## 2023-11-14 PROCEDURE — 3075F SYST BP GE 130 - 139MM HG: CPT | Performed by: FAMILY MEDICINE

## 2023-11-14 PROCEDURE — 99214 OFFICE O/P EST MOD 30 MIN: CPT | Performed by: FAMILY MEDICINE

## 2023-11-14 ASSESSMENT — ENCOUNTER SYMPTOMS
ABDOMINAL PAIN: 0
VOMITING: 0
NAUSEA: 0
VISUAL CHANGE: 0
SORE THROAT: 0
CHANGE IN BOWEL HABIT: 0
COUGH: 0
SWOLLEN GLANDS: 0

## 2023-11-14 ASSESSMENT — PATIENT HEALTH QUESTIONNAIRE - PHQ9
4. FEELING TIRED OR HAVING LITTLE ENERGY: 0
SUM OF ALL RESPONSES TO PHQ QUESTIONS 1-9: 0
9. THOUGHTS THAT YOU WOULD BE BETTER OFF DEAD, OR OF HURTING YOURSELF: 0
2. FEELING DOWN, DEPRESSED OR HOPELESS: 0
1. LITTLE INTEREST OR PLEASURE IN DOING THINGS: 0
SUM OF ALL RESPONSES TO PHQ QUESTIONS 1-9: 0
7. TROUBLE CONCENTRATING ON THINGS, SUCH AS READING THE NEWSPAPER OR WATCHING TELEVISION: 0
8. MOVING OR SPEAKING SO SLOWLY THAT OTHER PEOPLE COULD HAVE NOTICED. OR THE OPPOSITE, BEING SO FIGETY OR RESTLESS THAT YOU HAVE BEEN MOVING AROUND A LOT MORE THAN USUAL: 0
5. POOR APPETITE OR OVEREATING: 0
SUM OF ALL RESPONSES TO PHQ QUESTIONS 1-9: 0
10. IF YOU CHECKED OFF ANY PROBLEMS, HOW DIFFICULT HAVE THESE PROBLEMS MADE IT FOR YOU TO DO YOUR WORK, TAKE CARE OF THINGS AT HOME, OR GET ALONG WITH OTHER PEOPLE: 0
6. FEELING BAD ABOUT YOURSELF - OR THAT YOU ARE A FAILURE OR HAVE LET YOURSELF OR YOUR FAMILY DOWN: 0
3. TROUBLE FALLING OR STAYING ASLEEP: 0
SUM OF ALL RESPONSES TO PHQ QUESTIONS 1-9: 0
SUM OF ALL RESPONSES TO PHQ9 QUESTIONS 1 & 2: 0

## 2023-11-14 ASSESSMENT — COLUMBIA-SUICIDE SEVERITY RATING SCALE - C-SSRS
4. HAVE YOU HAD THESE THOUGHTS AND HAD SOME INTENTION OF ACTING ON THEM?: NO
5. HAVE YOU STARTED TO WORK OUT OR WORKED OUT THE DETAILS OF HOW TO KILL YOURSELF? DO YOU INTEND TO CARRY OUT THIS PLAN?: NO
3. HAVE YOU BEEN THINKING ABOUT HOW YOU MIGHT KILL YOURSELF?: NO
7. DID THIS OCCUR IN THE LAST THREE MONTHS: NO

## 2023-11-14 NOTE — ASSESSMENT & PLAN NOTE
Medication: Member called and inquired about getting some financial help with his prescriptions. I did tell him he would need to establish care with one of our [facility] PCP's. His last provider was [facility] whom he hasn't seen in over 4 yrs. Danilo stated that he will call us back when ready to schedule.   Unclear control, continue current medications pending work up below

## 2023-11-14 NOTE — PROGRESS NOTES
Subjective:     Patient Chris Laura is a 65 y.o. female. Fatigue  This is a new problem. The current episode started more than 1 year ago. The problem occurs constantly. The problem has been waxing and waning. Associated symptoms include arthralgias, fatigue, a fever, myalgias and vertigo. Pertinent negatives include no abdominal pain, anorexia, change in bowel habit, chest pain, chills, congestion, coughing, diaphoresis, headaches, joint swelling, nausea, neck pain, numbness, rash, sore throat, swollen glands, urinary symptoms, visual change, vomiting or weakness. Associated symptoms comments: Brain fog. Nothing aggravates the symptoms. She has tried nothing for the symptoms. The treatment provided no relief.    Got ill at beginning of Sept(with COVID) but then got ill at end of October with worsening sx-fatigue and brain fog-severe (?new case of COVID)arthralgias    Allergies   Allergen Reactions    Penicillins     Tetracyclines & Related Other (See Comments)     Vertigo and red eyes    Sulfa Antibiotics Rash       Current Outpatient Medications   Medication Sig Dispense Refill    empagliflozin (JARDIANCE) 10 MG tablet Take 1 tablet by mouth daily 90 tablet 1    Continuous Blood Gluc Sensor (FREESTYLE NUVIA 2 SENSOR) MISC REPLACE EVERY 2 WEEKS 6 each 3    metFORMIN (GLUCOPHAGE) 1000 MG tablet Take 1 tablet by mouth 2 times daily (with meals) TAKE 1 TABLET 2 TIMES DAILYWITH FOOD 180 tablet 0    insulin lispro (HUMALOG) 100 UNIT/ML SOLN injection vial INJECT UP  UNITS     UNDER THE SKIN DAILY VIA   INSULIN PUMP 90 mL 3    liothyronine (CYTOMEL) 5 MCG tablet TAKE 1 TABLET DAILY 90 tablet 3    Dulaglutide (TRULICITY) 3 RV/4.4RN SOPN 3mg weekly SC 13 mL 3    insulin glargine (LANTUS SOLOSTAR) 100 UNIT/ML injection pen 70 units daily when off pump 1 Adjustable Dose Pre-filled Pen Syringe 3    doxycycline hyclate (VIBRA-TABS) 100 MG tablet Take 1 pill PO daily 30 tablet 4    Azelaic Acid 15 % GEL Apply to

## 2023-11-15 LAB — SARS-COV-2 RNA RESP QL NAA+PROBE: NOT DETECTED

## 2024-01-02 ENCOUNTER — TELEPHONE (OUTPATIENT)
Dept: ENDOCRINOLOGY | Age: 64
End: 2024-01-02

## 2024-01-02 NOTE — TELEPHONE ENCOUNTER
Patient's [employer] form has the wrong pump model selected. Should be the 780 G. Also, Guardian 4 sensors and transmitter should be selected. Please correct, cross out, date, and initial corrections and then refax to number on form.     [phone] option 6 is a new form is needed.

## 2024-02-21 ENCOUNTER — OFFICE VISIT (OUTPATIENT)
Dept: ENDOCRINOLOGY | Age: 64
End: 2024-02-21
Payer: COMMERCIAL

## 2024-02-21 VITALS
OXYGEN SATURATION: 99 % | RESPIRATION RATE: 15 BRPM | BODY MASS INDEX: 38.8 KG/M2 | HEIGHT: 63 IN | DIASTOLIC BLOOD PRESSURE: 84 MMHG | HEART RATE: 68 BPM | TEMPERATURE: 97 F | SYSTOLIC BLOOD PRESSURE: 132 MMHG | WEIGHT: 219 LBS

## 2024-02-21 DIAGNOSIS — E11.65 UNCONTROLLED TYPE 2 DIABETES MELLITUS WITH HYPERGLYCEMIA (HCC): ICD-10-CM

## 2024-02-21 DIAGNOSIS — E11.65 UNCONTROLLED TYPE 2 DIABETES MELLITUS WITH HYPERGLYCEMIA (HCC): Primary | ICD-10-CM

## 2024-02-21 DIAGNOSIS — Z96.41 INSULIN PUMP IN PLACE: ICD-10-CM

## 2024-02-21 LAB
CREAT UR-MCNC: 76.6 MG/DL (ref 28–259)
MICROALBUMIN UR DL<=1MG/L-MCNC: <1.2 MG/DL
MICROALBUMIN/CREAT UR: NORMAL MG/G (ref 0–30)

## 2024-02-21 PROCEDURE — 3079F DIAST BP 80-89 MM HG: CPT | Performed by: INTERNAL MEDICINE

## 2024-02-21 PROCEDURE — 99214 OFFICE O/P EST MOD 30 MIN: CPT | Performed by: INTERNAL MEDICINE

## 2024-02-21 PROCEDURE — 3075F SYST BP GE 130 - 139MM HG: CPT | Performed by: INTERNAL MEDICINE

## 2024-02-21 PROCEDURE — 95251 CONT GLUC MNTR ANALYSIS I&R: CPT | Performed by: INTERNAL MEDICINE

## 2024-02-21 RX ORDER — INSULIN LISPRO 100 [IU]/ML
INJECTION, SOLUTION INTRAVENOUS; SUBCUTANEOUS
Qty: 15 EACH | Refills: 3 | Status: SHIPPED | OUTPATIENT
Start: 2024-02-21

## 2024-02-21 NOTE — PROGRESS NOTES
WEEKLY 12 capsule 3    losartan-hydroCHLOROthiazide (HYZAAR) 100-25 MG per tablet Take 1 tablet by mouth daily 90 tablet 3    diclofenac sodium (VOLTAREN) 1 % GEL APPLY ONE GRAM TOPICALLY TWO TIMES A  g 3    insulin lispro (HUMALOG) 100 UNIT/ML injection vial INJECT UP  UNITS     UNDER THE SKIN DAILY VIA   INSULIN PUMP 90 mL 3    Insulin Infusion Pump DIPTI Medtronic 770G insulin pump, Guardian Sensor 3 and transmitter 1 Device 0    medical marijuana Take 1 each by mouth as needed.      tiZANidine (ZANAFLEX) 2 MG tablet Take 1 tablet by mouth 3 times daily as needed (spasm) 30 tablet 2    aspirin 81 MG tablet Take 1 tablet by mouth daily       No current facility-administered medications for this visit.       Review of Systems  Scanned, reviewed    Vitals:    02/21/24 1125   BP: 132/84   Pulse: 68   Resp: 15   Temp: 97 °F (36.1 °C)   SpO2: 99%         Constitutional: Well-developed, appears stated age, cooperative, in no acute distress  H/E/N/M/T:atraumatic, normocephalic, external ears, nose, lips normal without lesions  Able to Visualize TM on both side. Mild wax   Eyes: Lids, lashes, conjunctivae and sclerae normal, No proptosis, no redness  Neck: supple, symmetrical, no swelling  Skin: No obvious rashes or lesions present.  Skin and hair texture normal  Psychiatric: Judgement and Insight:  judgement and insight appear normal  Neuro: Normal without focal findings, speech is normal normal, speech is spontaneous  Chest: No labored breathing, no chest deformity, no stridor  Musculoskeletal: No joint deformity, swelling  Foot exam:  Monofilament detected , no ulcers    Lab Reviewed   No components found for: \"CHLPL\"  Lab Results   Component Value Date    TRIG 144 12/28/2022    TRIG 95 10/18/2021    TRIG 139 10/19/2020     Lab Results   Component Value Date    HDL 38 (L) 12/28/2022    HDL 33 (L) 10/18/2021    HDL 35 (L) 10/19/2020     Lab Results   Component Value Date    LDLCALC 99 12/28/2022    LDLCALC

## 2024-02-22 LAB
EST. AVERAGE GLUCOSE BLD GHB EST-MCNC: 162.8 MG/DL
HBA1C MFR BLD: 7.3 %

## 2024-03-01 ENCOUNTER — OFFICE VISIT (OUTPATIENT)
Dept: FAMILY MEDICINE CLINIC | Age: 64
End: 2024-03-01
Payer: COMMERCIAL

## 2024-03-01 VITALS
HEIGHT: 63 IN | HEART RATE: 80 BPM | BODY MASS INDEX: 39.34 KG/M2 | DIASTOLIC BLOOD PRESSURE: 80 MMHG | OXYGEN SATURATION: 96 % | SYSTOLIC BLOOD PRESSURE: 120 MMHG | WEIGHT: 222 LBS

## 2024-03-01 DIAGNOSIS — E66.01 SEVERE OBESITY (BMI 35.0-39.9) WITH COMORBIDITY (HCC): ICD-10-CM

## 2024-03-01 DIAGNOSIS — I10 ESSENTIAL HYPERTENSION: ICD-10-CM

## 2024-03-01 DIAGNOSIS — Z79.4 TYPE 2 DIABETES MELLITUS WITH DIABETIC NEUROPATHY, WITH LONG-TERM CURRENT USE OF INSULIN (HCC): ICD-10-CM

## 2024-03-01 DIAGNOSIS — E78.2 MIXED HYPERLIPIDEMIA: ICD-10-CM

## 2024-03-01 DIAGNOSIS — E05.20 TOXIC MULTINODULAR GOITER: ICD-10-CM

## 2024-03-01 DIAGNOSIS — Z71.89 ACP (ADVANCE CARE PLANNING): ICD-10-CM

## 2024-03-01 DIAGNOSIS — C67.3 MALIGNANT NEOPLASM OF ANTERIOR WALL OF URINARY BLADDER (HCC): ICD-10-CM

## 2024-03-01 DIAGNOSIS — E11.40 TYPE 2 DIABETES MELLITUS WITH DIABETIC NEUROPATHY, WITH LONG-TERM CURRENT USE OF INSULIN (HCC): ICD-10-CM

## 2024-03-01 DIAGNOSIS — Z00.00 ENCOUNTER FOR WELL ADULT EXAM WITHOUT ABNORMAL FINDINGS: Primary | ICD-10-CM

## 2024-03-01 DIAGNOSIS — E55.9 VITAMIN D DEFICIENCY: ICD-10-CM

## 2024-03-01 DIAGNOSIS — Z79.4 DIABETES MELLITUS TYPE 2, INSULIN DEPENDENT (HCC): ICD-10-CM

## 2024-03-01 DIAGNOSIS — E11.65 UNCONTROLLED TYPE 2 DIABETES MELLITUS WITH HYPERGLYCEMIA (HCC): ICD-10-CM

## 2024-03-01 DIAGNOSIS — Z23 NEED FOR HEPATITIS A AND B VACCINATION: ICD-10-CM

## 2024-03-01 DIAGNOSIS — Z00.00 WELL ADULT EXAM: ICD-10-CM

## 2024-03-01 DIAGNOSIS — E11.9 DIABETES MELLITUS TYPE 2, INSULIN DEPENDENT (HCC): ICD-10-CM

## 2024-03-01 DIAGNOSIS — E66.9 NON MORBID OBESITY: ICD-10-CM

## 2024-03-01 PROBLEM — R41.89 BRAIN FOG: Status: RESOLVED | Noted: 2023-11-14 | Resolved: 2024-03-01

## 2024-03-01 LAB
25(OH)D3 SERPL-MCNC: 66.8 NG/ML
ALBUMIN SERPL-MCNC: 4.9 G/DL (ref 3.4–5)
ALBUMIN/GLOB SERPL: 2.1 {RATIO} (ref 1.1–2.2)
ALP SERPL-CCNC: 81 U/L (ref 40–129)
ALT SERPL-CCNC: 15 U/L (ref 10–40)
ANION GAP SERPL CALCULATED.3IONS-SCNC: 11 MMOL/L (ref 3–16)
AST SERPL-CCNC: 14 U/L (ref 15–37)
BASOPHILS # BLD: 0 K/UL (ref 0–0.2)
BASOPHILS NFR BLD: 0.5 %
BILIRUB SERPL-MCNC: 0.4 MG/DL (ref 0–1)
BUN SERPL-MCNC: 21 MG/DL (ref 7–20)
CALCIUM SERPL-MCNC: 9.8 MG/DL (ref 8.3–10.6)
CHLORIDE SERPL-SCNC: 102 MMOL/L (ref 99–110)
CHOLEST SERPL-MCNC: 150 MG/DL (ref 0–199)
CO2 SERPL-SCNC: 30 MMOL/L (ref 21–32)
CREAT SERPL-MCNC: 0.7 MG/DL (ref 0.6–1.2)
DEPRECATED RDW RBC AUTO: 14.1 % (ref 12.4–15.4)
EOSINOPHIL # BLD: 0.2 K/UL (ref 0–0.6)
EOSINOPHIL NFR BLD: 2 %
GFR SERPLBLD CREATININE-BSD FMLA CKD-EPI: >60 ML/MIN/{1.73_M2}
GLUCOSE SERPL-MCNC: 118 MG/DL (ref 70–99)
HCT VFR BLD AUTO: 39.6 % (ref 36–48)
HDLC SERPL-MCNC: 31 MG/DL (ref 40–60)
HGB BLD-MCNC: 13.3 G/DL (ref 12–16)
LDLC SERPL CALC-MCNC: 89 MG/DL
LYMPHOCYTES # BLD: 2 K/UL (ref 1–5.1)
LYMPHOCYTES NFR BLD: 25 %
MCH RBC QN AUTO: 29.3 PG (ref 26–34)
MCHC RBC AUTO-ENTMCNC: 33.7 G/DL (ref 31–36)
MCV RBC AUTO: 86.9 FL (ref 80–100)
MONOCYTES # BLD: 0.7 K/UL (ref 0–1.3)
MONOCYTES NFR BLD: 9.1 %
NEUTROPHILS # BLD: 5.1 K/UL (ref 1.7–7.7)
NEUTROPHILS NFR BLD: 63.4 %
PLATELET # BLD AUTO: 306 K/UL (ref 135–450)
PMV BLD AUTO: 8.6 FL (ref 5–10.5)
POTASSIUM SERPL-SCNC: 4.7 MMOL/L (ref 3.5–5.1)
PROT SERPL-MCNC: 7.2 G/DL (ref 6.4–8.2)
RBC # BLD AUTO: 4.56 M/UL (ref 4–5.2)
SODIUM SERPL-SCNC: 143 MMOL/L (ref 136–145)
T4 FREE SERPL-MCNC: 1.4 NG/DL (ref 0.9–1.8)
TRIGL SERPL-MCNC: 148 MG/DL (ref 0–150)
TSH SERPL DL<=0.005 MIU/L-ACNC: 0.24 UIU/ML (ref 0.27–4.2)
VIT B12 SERPL-MCNC: 373 PG/ML (ref 211–911)
VLDLC SERPL CALC-MCNC: 30 MG/DL
WBC # BLD AUTO: 8.1 K/UL (ref 4–11)

## 2024-03-01 PROCEDURE — 99396 PREV VISIT EST AGE 40-64: CPT | Performed by: FAMILY MEDICINE

## 2024-03-01 PROCEDURE — 90636 HEP A/HEP B VACC ADULT IM: CPT | Performed by: FAMILY MEDICINE

## 2024-03-01 PROCEDURE — 90471 IMMUNIZATION ADMIN: CPT | Performed by: FAMILY MEDICINE

## 2024-03-01 PROCEDURE — 3079F DIAST BP 80-89 MM HG: CPT | Performed by: FAMILY MEDICINE

## 2024-03-01 PROCEDURE — 3074F SYST BP LT 130 MM HG: CPT | Performed by: FAMILY MEDICINE

## 2024-03-01 ASSESSMENT — ENCOUNTER SYMPTOMS
VOICE CHANGE: 0
DIARRHEA: 0
TROUBLE SWALLOWING: 0
COUGH: 0
FACIAL SWELLING: 0
CHEST TIGHTNESS: 0
ABDOMINAL DISTENTION: 0
CONSTIPATION: 0
EYE DISCHARGE: 0
EYE REDNESS: 0
VOMITING: 0
BLOOD IN STOOL: 0
ANAL BLEEDING: 0
RHINORRHEA: 0
SORE THROAT: 0
SHORTNESS OF BREATH: 0
COLOR CHANGE: 0
STRIDOR: 0
RECTAL PAIN: 0
PHOTOPHOBIA: 0
EYE ITCHING: 0
SINUS PRESSURE: 0
BACK PAIN: 0
APNEA: 0
EYE PAIN: 0
WHEEZING: 0
ABDOMINAL PAIN: 0
CHOKING: 0
NAUSEA: 0

## 2024-03-01 ASSESSMENT — PATIENT HEALTH QUESTIONNAIRE - PHQ9
7. TROUBLE CONCENTRATING ON THINGS, SUCH AS READING THE NEWSPAPER OR WATCHING TELEVISION: 0
2. FEELING DOWN, DEPRESSED OR HOPELESS: 0
SUM OF ALL RESPONSES TO PHQ9 QUESTIONS 1 & 2: 0
9. THOUGHTS THAT YOU WOULD BE BETTER OFF DEAD, OR OF HURTING YOURSELF: 0
SUM OF ALL RESPONSES TO PHQ QUESTIONS 1-9: 0
SUM OF ALL RESPONSES TO PHQ QUESTIONS 1-9: 0
4. FEELING TIRED OR HAVING LITTLE ENERGY: 0
SUM OF ALL RESPONSES TO PHQ QUESTIONS 1-9: 0
3. TROUBLE FALLING OR STAYING ASLEEP: 0
6. FEELING BAD ABOUT YOURSELF - OR THAT YOU ARE A FAILURE OR HAVE LET YOURSELF OR YOUR FAMILY DOWN: 0
1. LITTLE INTEREST OR PLEASURE IN DOING THINGS: 0
SUM OF ALL RESPONSES TO PHQ QUESTIONS 1-9: 0
5. POOR APPETITE OR OVEREATING: 0
8. MOVING OR SPEAKING SO SLOWLY THAT OTHER PEOPLE COULD HAVE NOTICED. OR THE OPPOSITE, BEING SO FIGETY OR RESTLESS THAT YOU HAVE BEEN MOVING AROUND A LOT MORE THAN USUAL: 0
10. IF YOU CHECKED OFF ANY PROBLEMS, HOW DIFFICULT HAVE THESE PROBLEMS MADE IT FOR YOU TO DO YOUR WORK, TAKE CARE OF THINGS AT HOME, OR GET ALONG WITH OTHER PEOPLE: 0

## 2024-03-01 NOTE — PROGRESS NOTES
Well Adult Note  Name: Leena Melendrez Today’s Date: 3/1/2024   MRN: [id number] Sex: Female   Age: 63 y.o. Ethnicity: Non- / Non    : 1960 Race: White (non-)      Leena Melendrez is here for well adult exam.  History:  Cpx  -Malignant neoplasm of anterior wall of urinary bladder (HCC)   Unclear control, changes made today: -see urology--do u/a    Diabetes mellitus type 2, insulin dependent (HCC)   Monitored by specialist- no acute findings meriting change in the plan-labs    Toxic multinodular goiter  No thyroid--will check labs    Essential hypertension   Well-controlled, continue current medications-TOS/SE discussed    Mixed hyperlipidemia   Well-controlled, continue current medications-TOS/SE discussed    Uncontrolled type 2 diabetes mellitus with hyperglycemia (HCC)   Unclear control, continue current medications-complex tx--multiple meds and has pump--monitored by Farzana    Type 2 diabetes mellitus with diabetic neuropathy   Well-controlled, continue current medications    Non morbid obesity    Low carb diet      Review of Systems   Constitutional:  Negative for activity change, appetite change, chills, diaphoresis, fatigue, fever and unexpected weight change.   HENT:  Negative for congestion, dental problem, drooling, ear discharge, ear pain, facial swelling, hearing loss, mouth sores, nosebleeds, postnasal drip, rhinorrhea, sinus pressure, sneezing, sore throat, tinnitus, trouble swallowing and voice change.    Eyes:  Negative for photophobia, pain, discharge, redness, itching and visual disturbance.   Respiratory:  Negative for apnea, cough, choking, chest tightness, shortness of breath, wheezing and stridor.    Cardiovascular:  Negative for chest pain, palpitations and leg swelling.   Gastrointestinal:  Negative for abdominal distention, abdominal pain, anal bleeding, blood in stool, constipation, diarrhea, nausea, rectal pain and vomiting.   Genitourinary:  Negative for

## 2024-03-01 NOTE — PATIENT INSTRUCTIONS
Advance Care Planning     Advance Care Planning opens a door to talk about and write down your wishes before a sudden accident or illness.  Make your goals, values, and preferences known.     This puts you in the ’s seat and helps others know what matters most to you so they won’t have to guess.      Where can you learn more?    Go to [email or web address]   to learn how to:    Name someone you trust to make healthcare decisions for you, only if you can’t. (Healthcare Power of )    Document your wishes for care if you were seriously ill and not expected to recover or are approaching end of life. (Advance Directive or Living Will)    The same page can be found using the QR code below.                Starting a Weight Loss Plan: Care Instructions  Overview     If you're thinking about losing weight, it can be hard to know where to start. Your doctor can help you set up a weight loss plan that best meets your needs. You may want to take a class on nutrition or exercise, or you could join a weight loss support group. If you have questions about how to make changes to your eating or exercise habits, ask your doctor about seeing a registered dietitian or an exercise specialist.  It can be a big challenge to lose weight. But you don't have to make huge changes at once. Make small changes, and stick with them. When those changes become habit, add a few more changes.  If you don't think you're ready to make changes right now, try to pick a date in the future. Make an appointment to see your doctor to discuss whether the time is right for you to start a plan.  Follow-up care is a key part of your treatment and safety. Be sure to make and go to all appointments, and call your doctor if you are having problems. It's also a good idea to know your test results and keep a list of the medicines you take.  How can you care for yourself at home?  Set realistic goals. Many people

## 2024-03-01 NOTE — ASSESSMENT & PLAN NOTE
Unclear control, continue current medications-complex tx--multiple meds and has pump--monitored by Farzana

## 2024-03-06 ENCOUNTER — TELEPHONE (OUTPATIENT)
Dept: ENDOCRINOLOGY | Age: 64
End: 2024-03-06

## 2024-03-06 RX ORDER — LOSARTAN POTASSIUM AND HYDROCHLOROTHIAZIDE 25; 100 MG/1; MG/1
1 TABLET ORAL DAILY
Qty: 90 TABLET | Refills: 3 | Status: SHIPPED | OUTPATIENT
Start: 2024-03-06

## 2024-03-06 NOTE — TELEPHONE ENCOUNTER
Submitted PA for Insulin Lispro  Via M Key: [id number]    STATUS:Approved today  PA Case: [id number], Status: Approved, Coverage Starts on: 3/6/2024 12:00:00 AM, Coverage Ends on: 3/6/2025     PA was submitted for 140mL per 90 days

## 2024-03-12 RX ORDER — INSULIN LISPRO 100 [IU]/ML
INJECTION, SOLUTION INTRAVENOUS; SUBCUTANEOUS
Qty: 15 EACH | Refills: 3 | Status: SHIPPED | OUTPATIENT
Start: 2024-03-12

## 2024-03-12 NOTE — TELEPHONE ENCOUNTER
Medication:   Requested Prescriptions     Pending Prescriptions Disp Refills    insulin lispro (HUMALOG) 100 UNIT/ML SOLN injection vial 15 each 3     Si-150 units daily       Last Filled:      Patient Phone Number: [phone] (home)     Last appt: 2024   Next appt: 2024    Last Labs DM:   Lab Results   Component Value Date/Time    LABA1C 7.3 2024 12:29 PM

## 2024-03-25 RX ORDER — EMPAGLIFLOZIN 10 MG/1
10 TABLET, FILM COATED ORAL DAILY
Qty: 90 TABLET | Refills: 1 | Status: SHIPPED | OUTPATIENT
Start: 2024-03-25

## 2024-03-25 NOTE — TELEPHONE ENCOUNTER
Medication:   Requested Prescriptions     Pending Prescriptions Disp Refills    JARDIANCE 10 MG tablet [Pharmacy Med Name: JARDIANCE 10MG TABS] 90 tablet 1     Sig: TAKE 1 TABLET DAILY       Last Filled:      Patient Phone Number: [phone] (home)     Last appt: 2/21/2024   Next appt: 6/17/2024    Last Labs DM:   Lab Results   Component Value Date/Time    LABA1C 7.3 02/21/2024 12:29 PM

## 2024-03-26 RX ORDER — LEVOTHYROXINE SODIUM 125 MCG
TABLET ORAL
Qty: 90 TABLET | Refills: 0 | Status: SHIPPED | OUTPATIENT
Start: 2024-03-26

## 2024-03-26 NOTE — TELEPHONE ENCOUNTER
Requested Prescriptions     Pending Prescriptions Disp Refills    SYNTHROID 125 MCG tablet [Pharmacy Med Name: SYNTHROID 125MCG TABS] 90 tablet 0     Sig: TAKE 1 TABLET DAILY          Last Office Visit: 3/1/2024    Next Office Visit: Visit date not found

## 2024-04-15 ENCOUNTER — TELEPHONE (OUTPATIENT)
Dept: ENDOCRINOLOGY | Age: 64
End: 2024-04-15

## 2024-04-15 NOTE — TELEPHONE ENCOUNTER
Pt called in and said she needs to get ahold of the local [employer] rep about her CGM and not having insurance and being charged $608 a month for the guardian 4. Please advise. She said she calls the 800 number

## 2024-04-20 DIAGNOSIS — E11.65 UNCONTROLLED TYPE 2 DIABETES MELLITUS WITH HYPERGLYCEMIA (HCC): ICD-10-CM

## 2024-04-20 DIAGNOSIS — Z96.41 INSULIN PUMP IN PLACE: Primary | ICD-10-CM

## 2024-04-22 NOTE — TELEPHONE ENCOUNTER
Medication:   Requested Prescriptions     Pending Prescriptions Disp Refills    empagliflozin (JARDIANCE) 10 MG tablet 90 tablet 1     Sig: Take 1 tablet by mouth daily       Last Filled:      Patient Phone Number: [phone] (home)     Last appt: 2/21/2024   Next appt: 6/17/2024    Last Labs DM:   Lab Results   Component Value Date/Time    LABA1C 7.3 02/21/2024 12:29 PM

## 2024-05-13 ENCOUNTER — OFFICE VISIT (OUTPATIENT)
Dept: FAMILY MEDICINE CLINIC | Age: 64
End: 2024-05-13
Payer: COMMERCIAL

## 2024-05-13 VITALS — HEART RATE: 65 BPM | OXYGEN SATURATION: 96 % | BODY MASS INDEX: 39.08 KG/M2 | WEIGHT: 220.6 LBS

## 2024-05-13 DIAGNOSIS — B35.3 INFECTION, FUNGAL, LEFT FOOT: Primary | ICD-10-CM

## 2024-05-13 DIAGNOSIS — E11.40 TYPE 2 DIABETES MELLITUS WITH DIABETIC NEUROPATHY, WITH LONG-TERM CURRENT USE OF INSULIN (HCC): ICD-10-CM

## 2024-05-13 DIAGNOSIS — Z79.4 TYPE 2 DIABETES MELLITUS WITH DIABETIC NEUROPATHY, WITH LONG-TERM CURRENT USE OF INSULIN (HCC): ICD-10-CM

## 2024-05-13 PROCEDURE — 99213 OFFICE O/P EST LOW 20 MIN: CPT | Performed by: STUDENT IN AN ORGANIZED HEALTH CARE EDUCATION/TRAINING PROGRAM

## 2024-05-13 PROCEDURE — 3051F HG A1C>EQUAL 7.0%<8.0%: CPT | Performed by: STUDENT IN AN ORGANIZED HEALTH CARE EDUCATION/TRAINING PROGRAM

## 2024-05-13 RX ORDER — KETOCONAZOLE 20 MG/G
CREAM TOPICAL
Qty: 30 G | Refills: 1 | Status: SHIPPED | OUTPATIENT
Start: 2024-05-13

## 2024-05-13 RX ORDER — ERGOCALCIFEROL 1.25 MG/1
50000 CAPSULE ORAL WEEKLY
Qty: 12 CAPSULE | Refills: 3 | Status: SHIPPED | OUTPATIENT
Start: 2024-05-13

## 2024-05-13 SDOH — ECONOMIC STABILITY: FOOD INSECURITY: WITHIN THE PAST 12 MONTHS, THE FOOD YOU BOUGHT JUST DIDN'T LAST AND YOU DIDN'T HAVE MONEY TO GET MORE.: NEVER TRUE

## 2024-05-13 SDOH — ECONOMIC STABILITY: INCOME INSECURITY: HOW HARD IS IT FOR YOU TO PAY FOR THE VERY BASICS LIKE FOOD, HOUSING, MEDICAL CARE, AND HEATING?: NOT HARD AT ALL

## 2024-05-13 SDOH — ECONOMIC STABILITY: FOOD INSECURITY: WITHIN THE PAST 12 MONTHS, YOU WORRIED THAT YOUR FOOD WOULD RUN OUT BEFORE YOU GOT MONEY TO BUY MORE.: NEVER TRUE

## 2024-05-13 NOTE — PROGRESS NOTES
Chief Complaint   Patient presents with    Nail Problem     Pt states she has feet fungus, is not getting better with using cream           HPI: Leena Melendrez is a 63 y.o. female who presents for Left foot fungus    #Left foot fungus  -Has concern for fungus infection of left foot, has had symptoms of itching and flaking for over 2 weeks, frequently is in the water to do water aerobics to help with exercise, is also diabetic, has never seen a podiatrist, denies pain in her feet, fevers, chills, states she has neuropathy but this is not worse than her baseline, is able to fully bear weight, states her skin had been cracked prior to this, had tried some over-the-counter remedies, does not remember the name    Lab Results   Component Value Date    ALT 15 03/01/2024    AST 14 (L) 03/01/2024    ALKPHOS 81 03/01/2024    BILITOT 0.4 03/01/2024        Health Maintenance Due   Topic Date Due    Cervical cancer screen  Never done    Pneumococcal 0-64 years Vaccine (2 of 2 - PCV) 10/06/2017    Respiratory Syncytial Virus (RSV) Pregnant or age 60 yrs+ (1 - 1-dose 60+ series) Never done    COVID-19 Vaccine (6 - 2023-24 season) 10/07/2023    Diabetic foot exam  12/28/2023    Hepatitis B vaccine (2 of 3 - Hep B Twinrix 3-dose series) 03/29/2024          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

## 2024-06-17 ENCOUNTER — OFFICE VISIT (OUTPATIENT)
Dept: ENDOCRINOLOGY | Age: 64
End: 2024-06-17
Payer: COMMERCIAL

## 2024-06-17 VITALS
HEIGHT: 63 IN | DIASTOLIC BLOOD PRESSURE: 82 MMHG | SYSTOLIC BLOOD PRESSURE: 128 MMHG | HEART RATE: 80 BPM | RESPIRATION RATE: 14 BRPM | WEIGHT: 214 LBS | BODY MASS INDEX: 37.92 KG/M2 | OXYGEN SATURATION: 99 %

## 2024-06-17 DIAGNOSIS — Z96.41 INSULIN PUMP IN PLACE: ICD-10-CM

## 2024-06-17 DIAGNOSIS — E11.9 DIABETES MELLITUS TYPE 2, INSULIN DEPENDENT (HCC): Primary | ICD-10-CM

## 2024-06-17 DIAGNOSIS — Z79.4 DIABETES MELLITUS TYPE 2, INSULIN DEPENDENT (HCC): Primary | ICD-10-CM

## 2024-06-17 LAB — HBA1C MFR BLD: 6.8 %

## 2024-06-17 PROCEDURE — 3079F DIAST BP 80-89 MM HG: CPT | Performed by: INTERNAL MEDICINE

## 2024-06-17 PROCEDURE — 3051F HG A1C>EQUAL 7.0%<8.0%: CPT | Performed by: INTERNAL MEDICINE

## 2024-06-17 PROCEDURE — 99214 OFFICE O/P EST MOD 30 MIN: CPT | Performed by: INTERNAL MEDICINE

## 2024-06-17 PROCEDURE — 83036 HEMOGLOBIN GLYCOSYLATED A1C: CPT | Performed by: INTERNAL MEDICINE

## 2024-06-17 PROCEDURE — 95251 CONT GLUC MNTR ANALYSIS I&R: CPT | Performed by: INTERNAL MEDICINE

## 2024-06-17 PROCEDURE — 3074F SYST BP LT 130 MM HG: CPT | Performed by: INTERNAL MEDICINE

## 2024-06-17 RX ORDER — DULAGLUTIDE 3 MG/.5ML
INJECTION, SOLUTION SUBCUTANEOUS
Qty: 13 ML | Refills: 3 | Status: SHIPPED | OUTPATIENT
Start: 2024-06-17

## 2024-06-17 NOTE — PROGRESS NOTES
Seen as  patient for diabetes      Interim:    Using CGM  Glucose better  [employer] customer service issue  Reports skin fungal infection  Tried topical but not healed  Still goes to pool    Diagnosed with Type 2 diabetes mellitus in  2005  Known diabetic complications: none   Uncontrolled, moderate    Current diabetic medications     Metformin 1gm BID  jardiance    Trulicity 3 mg    Insulin pump  basal as:    basal as:     MN   4.2  6 am 2.5  5:30 pm 2.8    I:C   3.5  CF 22  Target 110-120    Last A1c 6.8%<----7.9%<---- 8.3%<----7.1%<-----8% <-----7.4%<---7%<-----7.1%<---- 6.4%<--- 6.6%<----- 6.8% < ---- 7%<----6.8%<---- 8.2%<----7.3%<-----   7.6%<--- 7.9 <--- -8.3    Moderate, controlled    Prior visit with dietician: no  Current diet: on average, 3 meals per day   Current exercise: walking   Current monitoring regimen: home blood tests -     CGM  Last 2 weeks  Average 141    90% in range  9 % high    Any episodes of hypoglycemia? No recent    No Hx of CAD , PVD, CVA    Hyperlipidemia:   For   Years  Takes lipitor 20mg  Controlled  LDL 76 on 3/19   LDL 78 on 10/20  LDL 99 on 12/22    Hypertension for years  Stable  Takes  Losartan 100mg    Last eye exam: 10/23  Last foot exam: 11/23  Last microalbumin to creatinine ratio:  12/22    She has a h/o hypothyroidism    H/o QUIJANO for hyperthyroidism    S/p Thyroidectomy for thyroid Nodule: had atypical cells on FNA and patient had surgery with Dr Orozco in 2/2013, normal path, Hurtle cell adenoma;     TSH 2.68 on 3/19  TSH 1.84 on 9/20  TSH 1.0 on 10/21  TSH 2.3 on 12/22    Synthroid 125mcg cytomel 5mcg daily    She is taking Vit D 50,000 IU , weekly, needs level checked    Past Medical History:   Diagnosis Date    Cystic acne     Diverticulitis large intestine 07/07/2010    Diverticulosis of colon     Episcleritis     HTN (hypertension) 12/06/2012    Hyperemesis gravidarum     Hyperlipidemia     Hyperplastic polyp of transverse colon 12/28/2022    Hyperthyroidism

## 2024-08-01 ENCOUNTER — OFFICE VISIT (OUTPATIENT)
Dept: FAMILY MEDICINE CLINIC | Age: 64
End: 2024-08-01
Payer: COMMERCIAL

## 2024-08-01 VITALS
DIASTOLIC BLOOD PRESSURE: 60 MMHG | WEIGHT: 222 LBS | BODY MASS INDEX: 39.33 KG/M2 | OXYGEN SATURATION: 98 % | HEART RATE: 68 BPM | SYSTOLIC BLOOD PRESSURE: 116 MMHG

## 2024-08-01 DIAGNOSIS — E89.0 POSTOPERATIVE HYPOTHYROIDISM: ICD-10-CM

## 2024-08-01 DIAGNOSIS — I10 ESSENTIAL HYPERTENSION: Primary | ICD-10-CM

## 2024-08-01 DIAGNOSIS — Z23 NEED FOR HEPATITIS B VACCINATION: ICD-10-CM

## 2024-08-01 LAB
T4 FREE SERPL-MCNC: 1.2 NG/DL (ref 0.9–1.8)
TSH SERPL DL<=0.005 MIU/L-ACNC: 0.85 UIU/ML (ref 0.27–4.2)

## 2024-08-01 PROCEDURE — 90746 HEPB VACCINE 3 DOSE ADULT IM: CPT | Performed by: FAMILY MEDICINE

## 2024-08-01 PROCEDURE — 3074F SYST BP LT 130 MM HG: CPT | Performed by: FAMILY MEDICINE

## 2024-08-01 PROCEDURE — 99213 OFFICE O/P EST LOW 20 MIN: CPT | Performed by: FAMILY MEDICINE

## 2024-08-01 PROCEDURE — 3078F DIAST BP <80 MM HG: CPT | Performed by: FAMILY MEDICINE

## 2024-08-01 PROCEDURE — 90471 IMMUNIZATION ADMIN: CPT | Performed by: FAMILY MEDICINE

## 2024-08-01 SDOH — ECONOMIC STABILITY: FOOD INSECURITY: WITHIN THE PAST 12 MONTHS, THE FOOD YOU BOUGHT JUST DIDN'T LAST AND YOU DIDN'T HAVE MONEY TO GET MORE.: NEVER TRUE

## 2024-08-01 SDOH — ECONOMIC STABILITY: FOOD INSECURITY: WITHIN THE PAST 12 MONTHS, YOU WORRIED THAT YOUR FOOD WOULD RUN OUT BEFORE YOU GOT MONEY TO BUY MORE.: NEVER TRUE

## 2024-08-01 SDOH — ECONOMIC STABILITY: INCOME INSECURITY: HOW HARD IS IT FOR YOU TO PAY FOR THE VERY BASICS LIKE FOOD, HOUSING, MEDICAL CARE, AND HEATING?: NOT HARD AT ALL

## 2024-08-01 ASSESSMENT — ENCOUNTER SYMPTOMS
DIARRHEA: 0
VISUAL CHANGE: 0
HAIR LOSS: 0
CONSTIPATION: 0
HOARSE VOICE: 0

## 2024-08-01 ASSESSMENT — PATIENT HEALTH QUESTIONNAIRE - PHQ9
4. FEELING TIRED OR HAVING LITTLE ENERGY: NOT AT ALL
5. POOR APPETITE OR OVEREATING: NOT AT ALL
2. FEELING DOWN, DEPRESSED OR HOPELESS: NOT AT ALL
SUM OF ALL RESPONSES TO PHQ9 QUESTIONS 1 & 2: 0
1. LITTLE INTEREST OR PLEASURE IN DOING THINGS: NOT AT ALL
SUM OF ALL RESPONSES TO PHQ QUESTIONS 1-9: 0
SUM OF ALL RESPONSES TO PHQ QUESTIONS 1-9: 0
3. TROUBLE FALLING OR STAYING ASLEEP: NOT AT ALL
SUM OF ALL RESPONSES TO PHQ QUESTIONS 1-9: 0
9. THOUGHTS THAT YOU WOULD BE BETTER OFF DEAD, OR OF HURTING YOURSELF: NOT AT ALL
SUM OF ALL RESPONSES TO PHQ QUESTIONS 1-9: 0
7. TROUBLE CONCENTRATING ON THINGS, SUCH AS READING THE NEWSPAPER OR WATCHING TELEVISION: NOT AT ALL
10. IF YOU CHECKED OFF ANY PROBLEMS, HOW DIFFICULT HAVE THESE PROBLEMS MADE IT FOR YOU TO DO YOUR WORK, TAKE CARE OF THINGS AT HOME, OR GET ALONG WITH OTHER PEOPLE: NOT DIFFICULT AT ALL
6. FEELING BAD ABOUT YOURSELF - OR THAT YOU ARE A FAILURE OR HAVE LET YOURSELF OR YOUR FAMILY DOWN: NOT AT ALL
8. MOVING OR SPEAKING SO SLOWLY THAT OTHER PEOPLE COULD HAVE NOTICED. OR THE OPPOSITE, BEING SO FIGETY OR RESTLESS THAT YOU HAVE BEEN MOVING AROUND A LOT MORE THAN USUAL: NOT AT ALL

## 2024-08-01 ASSESSMENT — ANXIETY QUESTIONNAIRES
7. FEELING AFRAID AS IF SOMETHING AWFUL MIGHT HAPPEN: NOT AT ALL
IF YOU CHECKED OFF ANY PROBLEMS ON THIS QUESTIONNAIRE, HOW DIFFICULT HAVE THESE PROBLEMS MADE IT FOR YOU TO DO YOUR WORK, TAKE CARE OF THINGS AT HOME, OR GET ALONG WITH OTHER PEOPLE: NOT DIFFICULT AT ALL
3. WORRYING TOO MUCH ABOUT DIFFERENT THINGS: NOT AT ALL
4. TROUBLE RELAXING: NOT AT ALL
1. FEELING NERVOUS, ANXIOUS, OR ON EDGE: NOT AT ALL
5. BEING SO RESTLESS THAT IT IS HARD TO SIT STILL: NOT AT ALL
2. NOT BEING ABLE TO STOP OR CONTROL WORRYING: NOT AT ALL
GAD7 TOTAL SCORE: 0
6. BECOMING EASILY ANNOYED OR IRRITABLE: NOT AT ALL

## 2024-08-01 NOTE — PROGRESS NOTES
(HUMALOG) 100 UNIT/ML SOLN injection vial INJECT UP  UNITS     UNDER THE SKIN DAILY VIA   INSULIN PUMP 90 mL 3    insulin lispro (HUMALOG) 100 UNIT/ML injection vial INJECT UP  UNITS     UNDER THE SKIN DAILY VIA   INSULIN PUMP (Patient taking differently: INJECT UP  UNITS     UNDER THE SKIN DAILY VIA   INSULIN PUMP) 90 mL 3    tiZANidine (ZANAFLEX) 2 MG tablet Take 1 tablet by mouth 3 times daily as needed (spasm) 30 tablet 2     No current facility-administered medications for this visit.       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   Procedure Laterality Date    BLADDER SURGERY  12/01/2016    cancerous tumor removed from bladder    COLONOSCOPY  2012    polyps    COLONOSCOPY  04/20/2018    Go-polyps    OTHER SURGICAL HISTORY  2003    neuro surgery- CTS repair    THYROIDECTOMY  02/2013    UMBILICAL HERNIA REPAIR  05/05/2016    with mesh        Social History     Socioeconomic History    Marital status:      Spouse name: Not on file    Number of children: 2    Years of education: Not on file    Highest education level: Not on file   Occupational History    Occupation: "[employer]   Tobacco Use    Smoking status: Never    Smokeless tobacco: Never   Vaping Use    Vaping Use: Never used   Substance and Sexual Activity    Alcohol use: Yes

## 2024-09-17 RX ORDER — LEVOTHYROXINE SODIUM 125 UG/1
125 TABLET ORAL DAILY
Qty: 90 TABLET | Refills: 0 | Status: SHIPPED | OUTPATIENT
Start: 2024-09-17

## 2024-09-17 RX ORDER — LIOTHYRONINE SODIUM 5 UG/1
5 TABLET ORAL DAILY
Qty: 90 TABLET | Refills: 3 | Status: SHIPPED | OUTPATIENT
Start: 2024-09-17

## 2024-10-21 ENCOUNTER — OFFICE VISIT (OUTPATIENT)
Dept: ENDOCRINOLOGY | Age: 64
End: 2024-10-21
Payer: COMMERCIAL

## 2024-10-21 VITALS
HEART RATE: 67 BPM | WEIGHT: 224.4 LBS | RESPIRATION RATE: 14 BRPM | DIASTOLIC BLOOD PRESSURE: 74 MMHG | SYSTOLIC BLOOD PRESSURE: 134 MMHG | BODY MASS INDEX: 39.75 KG/M2 | OXYGEN SATURATION: 99 %

## 2024-10-21 DIAGNOSIS — E11.9 DIABETES MELLITUS TYPE 2, INSULIN DEPENDENT (HCC): Primary | ICD-10-CM

## 2024-10-21 DIAGNOSIS — I10 ESSENTIAL HYPERTENSION: ICD-10-CM

## 2024-10-21 DIAGNOSIS — Z79.4 DIABETES MELLITUS TYPE 2, INSULIN DEPENDENT (HCC): Primary | ICD-10-CM

## 2024-10-21 LAB — HBA1C MFR BLD: 6.9 %

## 2024-10-21 PROCEDURE — 3075F SYST BP GE 130 - 139MM HG: CPT | Performed by: INTERNAL MEDICINE

## 2024-10-21 PROCEDURE — 95251 CONT GLUC MNTR ANALYSIS I&R: CPT | Performed by: INTERNAL MEDICINE

## 2024-10-21 PROCEDURE — 3044F HG A1C LEVEL LT 7.0%: CPT | Performed by: INTERNAL MEDICINE

## 2024-10-21 PROCEDURE — 99214 OFFICE O/P EST MOD 30 MIN: CPT | Performed by: INTERNAL MEDICINE

## 2024-10-21 PROCEDURE — 3078F DIAST BP <80 MM HG: CPT | Performed by: INTERNAL MEDICINE

## 2024-10-21 PROCEDURE — 83036 HEMOGLOBIN GLYCOSYLATED A1C: CPT | Performed by: INTERNAL MEDICINE

## 2024-10-21 NOTE — PROGRESS NOTES
Seen as  patient for diabetes      Interim:    Using CGM  Glucose better  Had pump replacement due to crack  Neuropathy symptoms better  Feels weak the next day of trulicity    Diagnosed with Type 2 diabetes mellitus in  2005  Known diabetic complications: none   Uncontrolled, moderate    Current diabetic medications     Metformin 1gm BID  jardiance    Trulicity 3 mg    Insulin pump  basal as:    basal as:     MN   4.2  6 am 2.5  5:30 pm 2.8    I:C   3.5  CF 22  Target 110-120    Last A1c 6.9%<---6.8%<----7.9%<---- 8.3%<----7.1%<-----8% <-----7.4%<---7%<-----7.1%<---- 6.4%<--- 6.6%<----- 6.8% < ---- 7%<----6.8%<---- 8.2%<----7.3%<-----   7.6%<--- 7.9 <--- -8.3    Moderate, controlled    Prior visit with dietician: no  Current diet: on average, 3 meals per day   Current exercise: walking   Current monitoring regimen: home blood tests -     CGM  Last 2 weeks  Average 147    88% in range  12 % high    Any episodes of hypoglycemia? No recent    No Hx of CAD , PVD, CVA    Hyperlipidemia:   For   Years  Takes lipitor 20mg  Controlled  LDL 76 on 3/19   LDL 78 on 10/20  LDL 99 on 12/22    Hypertension for years  Stable  Takes  Losartan 100mg    Last eye exam: 10/23  eye injection for occluded vein  Last foot exam: 11/23  Last microalbumin to creatinine ratio:  2/24    She has a h/o hypothyroidism    H/o QUIJANO for hyperthyroidism    S/p Thyroidectomy for thyroid Nodule: had atypical cells on FNA and patient had surgery with Dr Orozco in 2/2013, normal path, Hurtle cell adenoma;     TSH 2.68 on 3/19  TSH 1.84 on 9/20  TSH 1.0 on 10/21  TSH 2.3 on 12/22    Synthroid 125mcg cytomel 5mcg daily    She is taking Vit D 50,000 IU , weekly, needs level checked    Past Medical History:   Diagnosis Date    Cystic acne     Diverticulitis large intestine 07/07/2010    Diverticulosis of colon     Episcleritis     HTN (hypertension) 12/06/2012    Hyperemesis gravidarum     Hyperlipidemia     Hyperplastic polyp of transverse colon

## 2024-11-25 DIAGNOSIS — E11.65 UNCONTROLLED TYPE 2 DIABETES MELLITUS WITH HYPERGLYCEMIA (HCC): ICD-10-CM

## 2024-11-25 RX ORDER — EMPAGLIFLOZIN 10 MG/1
TABLET, FILM COATED ORAL
Qty: 90 TABLET | Refills: 0 | Status: SHIPPED | OUTPATIENT
Start: 2024-11-25

## 2024-12-16 RX ORDER — ATORVASTATIN CALCIUM 20 MG/1
TABLET, FILM COATED ORAL
Qty: 90 TABLET | Refills: 3 | Status: SHIPPED | OUTPATIENT
Start: 2024-12-16

## 2024-12-16 RX ORDER — LEVOTHYROXINE SODIUM 125 UG/1
125 TABLET ORAL DAILY
Qty: 90 TABLET | Refills: 0 | Status: SHIPPED | OUTPATIENT
Start: 2024-12-16

## 2025-01-21 ENCOUNTER — OFFICE VISIT (OUTPATIENT)
Dept: ENDOCRINOLOGY | Age: 65
End: 2025-01-21
Payer: COMMERCIAL

## 2025-01-21 VITALS
OXYGEN SATURATION: 99 % | DIASTOLIC BLOOD PRESSURE: 84 MMHG | BODY MASS INDEX: 40.74 KG/M2 | RESPIRATION RATE: 15 BRPM | SYSTOLIC BLOOD PRESSURE: 126 MMHG | HEART RATE: 76 BPM | WEIGHT: 230 LBS

## 2025-01-21 DIAGNOSIS — E89.0 POSTOPERATIVE HYPOTHYROIDISM: ICD-10-CM

## 2025-01-21 DIAGNOSIS — Z79.4 DIABETES MELLITUS TYPE 2, INSULIN DEPENDENT (HCC): Primary | ICD-10-CM

## 2025-01-21 DIAGNOSIS — E11.9 DIABETES MELLITUS TYPE 2, INSULIN DEPENDENT (HCC): ICD-10-CM

## 2025-01-21 DIAGNOSIS — Z79.4 DIABETES MELLITUS TYPE 2, INSULIN DEPENDENT (HCC): ICD-10-CM

## 2025-01-21 DIAGNOSIS — E11.9 DIABETES MELLITUS TYPE 2, INSULIN DEPENDENT (HCC): Primary | ICD-10-CM

## 2025-01-21 LAB — HBA1C MFR BLD: 7.2 %

## 2025-01-21 PROCEDURE — 83036 HEMOGLOBIN GLYCOSYLATED A1C: CPT | Performed by: INTERNAL MEDICINE

## 2025-01-21 PROCEDURE — 3074F SYST BP LT 130 MM HG: CPT | Performed by: INTERNAL MEDICINE

## 2025-01-21 PROCEDURE — 3079F DIAST BP 80-89 MM HG: CPT | Performed by: INTERNAL MEDICINE

## 2025-01-21 PROCEDURE — 95251 CONT GLUC MNTR ANALYSIS I&R: CPT | Performed by: INTERNAL MEDICINE

## 2025-01-21 PROCEDURE — 99214 OFFICE O/P EST MOD 30 MIN: CPT | Performed by: INTERNAL MEDICINE

## 2025-01-21 RX ORDER — DULAGLUTIDE 0.75 MG/.5ML
0.75 INJECTION, SOLUTION SUBCUTANEOUS WEEKLY
Qty: 4 ADJUSTABLE DOSE PRE-FILLED PEN SYRINGE | Refills: 3 | Status: SHIPPED | OUTPATIENT
Start: 2025-01-21

## 2025-01-21 NOTE — PROGRESS NOTES
Seen as  patient for diabetes      Interim:    Using CGM  Glucose better  Feels weak the next day of trulicity  States on levothyroxine instead of Synthroid  Needs TFT    Diagnosed with Type 2 diabetes mellitus in  2005  Known diabetic complications: none   Uncontrolled, moderate    Current diabetic medications     Metformin 1gm BID  jardiance    Trulicity 3 mg    Insulin pump  basal as:    basal as:     MN   4.2  6 am 2.5  5:30 pm 2.8    I:C   3.5  CF 22  Target 110-120    Last A1c 6.9%<---6.8%<----7.9%<---- 8.3%<----7.1%<-----8% <-----7.4%<---7%<-----7.1%<---- 6.4%<--- 6.6%<----- 6.8% < ---- 7%<----6.8%<---- 8.2%<----7.3%<-----   7.6%<--- 7.9 <--- -8.3    Moderate, controlled    Prior visit with dietician: no  Current diet: on average, 3 meals per day   Current exercise: walking   Current monitoring regimen: home blood tests -     CGM  Last 2 weeks  Average 155    76% in range  24 % high    Any episodes of hypoglycemia? No recent    No Hx of CAD , PVD, CVA    Hyperlipidemia:   For   Years  Takes lipitor 20mg  Controlled  LDL 76 on 3/19   LDL 78 on 10/20  LDL 99 on 12/22    Hypertension for years  Stable  Takes  Losartan 100mg    Last eye exam: 10/23  eye injection for occluded vein  Last foot exam: 1/25  Last microalbumin to creatinine ratio:  2/24    She has a h/o hypothyroidism    H/o QUIJANO for hyperthyroidism    S/p Thyroidectomy for thyroid Nodule: had atypical cells on FNA and patient had surgery with Dr Orozco in 2/2013, normal path, Hurtle cell adenoma;     TSH 2.68 on 3/19  TSH 1.84 on 9/20  TSH 1.0 on 10/21  TSH 2.3 on 12/22    Synthroid 125mcg cytomel 5mcg daily    She is taking Vit D 50,000 IU , weekly, needs level checked    Past Medical History:   Diagnosis Date    Cystic acne     Diverticulitis large intestine 07/07/2010    Diverticulosis of colon     Episcleritis     HTN (hypertension) 12/06/2012    Hyperemesis gravidarum     Hyperlipidemia     Hyperplastic polyp of transverse colon 12/28/2022

## 2025-01-22 LAB
CREAT UR-MCNC: 73.8 MG/DL (ref 28–259)
MICROALBUMIN UR DL<=1MG/L-MCNC: <1.2 MG/DL
MICROALBUMIN/CREAT UR: NORMAL MG/G (ref 0–30)
T3FREE SERPL-MCNC: 3.1 PG/ML (ref 2.3–4.2)
T4 FREE SERPL-MCNC: 1.2 NG/DL (ref 0.9–1.8)
TSH SERPL DL<=0.005 MIU/L-ACNC: 0.73 UIU/ML (ref 0.27–4.2)

## 2025-02-18 DIAGNOSIS — E11.65 UNCONTROLLED TYPE 2 DIABETES MELLITUS WITH HYPERGLYCEMIA (HCC): ICD-10-CM

## 2025-02-18 RX ORDER — EMPAGLIFLOZIN 10 MG/1
10 TABLET, FILM COATED ORAL DAILY
Qty: 90 TABLET | Refills: 0 | Status: SHIPPED | OUTPATIENT
Start: 2025-02-18

## 2025-02-27 ENCOUNTER — OFFICE VISIT (OUTPATIENT)
Dept: FAMILY MEDICINE CLINIC | Age: 65
End: 2025-02-27

## 2025-02-27 VITALS
OXYGEN SATURATION: 98 % | WEIGHT: 222 LBS | BODY MASS INDEX: 39.34 KG/M2 | HEIGHT: 63 IN | SYSTOLIC BLOOD PRESSURE: 112 MMHG | TEMPERATURE: 98.5 F | HEART RATE: 67 BPM | DIASTOLIC BLOOD PRESSURE: 78 MMHG

## 2025-02-27 DIAGNOSIS — L30.9 ECZEMA, UNSPECIFIED TYPE: ICD-10-CM

## 2025-02-27 DIAGNOSIS — M79.671 RIGHT FOOT PAIN: Primary | ICD-10-CM

## 2025-02-27 SDOH — ECONOMIC STABILITY: FOOD INSECURITY: WITHIN THE PAST 12 MONTHS, YOU WORRIED THAT YOUR FOOD WOULD RUN OUT BEFORE YOU GOT MONEY TO BUY MORE.: NEVER TRUE

## 2025-02-27 SDOH — ECONOMIC STABILITY: FOOD INSECURITY: WITHIN THE PAST 12 MONTHS, THE FOOD YOU BOUGHT JUST DIDN'T LAST AND YOU DIDN'T HAVE MONEY TO GET MORE.: NEVER TRUE

## 2025-02-27 ASSESSMENT — PATIENT HEALTH QUESTIONNAIRE - PHQ9
SUM OF ALL RESPONSES TO PHQ QUESTIONS 1-9: 0
DEPRESSION UNABLE TO ASSESS: FUNCTIONAL CAPACITY MOTIVATION LIMITS ACCURACY
3. TROUBLE FALLING OR STAYING ASLEEP: NOT AT ALL
10. IF YOU CHECKED OFF ANY PROBLEMS, HOW DIFFICULT HAVE THESE PROBLEMS MADE IT FOR YOU TO DO YOUR WORK, TAKE CARE OF THINGS AT HOME, OR GET ALONG WITH OTHER PEOPLE: NOT DIFFICULT AT ALL
8. MOVING OR SPEAKING SO SLOWLY THAT OTHER PEOPLE COULD HAVE NOTICED. OR THE OPPOSITE, BEING SO FIGETY OR RESTLESS THAT YOU HAVE BEEN MOVING AROUND A LOT MORE THAN USUAL: NOT AT ALL
SUM OF ALL RESPONSES TO PHQ QUESTIONS 1-9: 0
SUM OF ALL RESPONSES TO PHQ9 QUESTIONS 1 & 2: 0
7. TROUBLE CONCENTRATING ON THINGS, SUCH AS READING THE NEWSPAPER OR WATCHING TELEVISION: NOT AT ALL
4. FEELING TIRED OR HAVING LITTLE ENERGY: NOT AT ALL
5. POOR APPETITE OR OVEREATING: NOT AT ALL
6. FEELING BAD ABOUT YOURSELF - OR THAT YOU ARE A FAILURE OR HAVE LET YOURSELF OR YOUR FAMILY DOWN: NOT AT ALL
SUM OF ALL RESPONSES TO PHQ QUESTIONS 1-9: 0
2. FEELING DOWN, DEPRESSED OR HOPELESS: NOT AT ALL
1. LITTLE INTEREST OR PLEASURE IN DOING THINGS: NOT AT ALL
9. THOUGHTS THAT YOU WOULD BE BETTER OFF DEAD, OR OF HURTING YOURSELF: NOT AT ALL
SUM OF ALL RESPONSES TO PHQ QUESTIONS 1-9: 0

## 2025-02-27 ASSESSMENT — ENCOUNTER SYMPTOMS
SINUS PAIN: 0
DIARRHEA: 0
SINUS PRESSURE: 0
COLOR CHANGE: 0
SHORTNESS OF BREATH: 0
CONSTIPATION: 0
WHEEZING: 0
ABDOMINAL PAIN: 0
COUGH: 0
BACK PAIN: 0

## 2025-02-27 NOTE — PROGRESS NOTES
Leena Melendrez (:  1960) is a 64 y.o. female,Established patient, here for evaluation of the following chief complaint(s):  Foot Swelling (Rash, swelling and pain in right second and third toe)      ASSESSMENT/PLAN:  Assessment & Plan  Right foot pain   Chronic, not at goal (unstable), rash may be exacerbating foot pain but likely not the sole cause.  Refer to podiatry.  Eczema, unspecified type   Chronic, not at goal (unstable), seeing dermatology.  Only mild improvement after starting triamcinolone ointment.  More concerned about the pain she is having in her toes that is just adjacent to the rash.       No follow-ups on file.    SUBJECTIVE/OBJECTIVE:    History of Present Illness  The patient presents for evaluation of toe swelling and foot swelling.    She has been experiencing a persistent rash to the top of her right foot since December, initially suspected to be athlete's foot. However, upon examination, the endocrinologist suggested a possible diagnosis of eczema. A subsequent consultation with a dermatologist led to the prescription of triamcinolone ointment, which unfortunately did not alleviate the symptoms. She reports that the rash is itchy and occasionally burns, and she experiences pain in two of her toes.   She has been using the triamcinolone ointment twice daily for the past 3 weeks, in addition to cortisone and Aquaphor.   She also reports facial flushing, which she attributes to rosacea. She uses metronidazole gel and ivermectin on her face, along with a compound from a different pharmacy, applied twice daily. She does not currently see a podiatrist but is open to a referral if necessary. She has a history of rashes on her feet and hands, previously treated as athlete's foot, but these have been resistant to treatment. The rash on her hands has resolved, but the one on her foot persists. She has been dealing with these breakouts on her hands and feet for the past year.    She is an

## 2025-03-04 RX ORDER — LOSARTAN POTASSIUM AND HYDROCHLOROTHIAZIDE 25; 100 MG/1; MG/1
1 TABLET ORAL DAILY
Qty: 90 TABLET | Refills: 3 | Status: SHIPPED | OUTPATIENT
Start: 2025-03-04

## 2025-03-14 RX ORDER — LEVOTHYROXINE SODIUM 125 UG/1
125 TABLET ORAL DAILY
Qty: 30 TABLET | Refills: 0 | Status: SHIPPED | OUTPATIENT
Start: 2025-03-14

## 2025-03-31 RX ORDER — INSULIN LISPRO 100 [IU]/ML
INJECTION, SOLUTION INTRAVENOUS; SUBCUTANEOUS
Qty: 40 ML | Refills: 3 | Status: SHIPPED | OUTPATIENT
Start: 2025-03-31

## 2025-03-31 NOTE — TELEPHONE ENCOUNTER
Medication:   Requested Prescriptions     Pending Prescriptions Disp Refills    insulin lispro (HUMALOG,ADMELOG) 100 UNIT/ML SOLN injection vial [Pharmacy Med Name: INSULIN LISPRO 100 UNIT/ML VL] 40 mL 3     Sig: INJECT 120-150 UNITS DAILY       Last Filled:      Patient Phone Number: [phone] (home)     Last appt: 1/21/2025   Next appt: 4/22/2025    Last Labs DM:   Lab Results   Component Value Date/Time    LABA1C 7.2 01/21/2025 04:54 PM

## 2025-04-01 ENCOUNTER — OFFICE VISIT (OUTPATIENT)
Dept: FAMILY MEDICINE CLINIC | Age: 65
End: 2025-04-01

## 2025-04-01 VITALS
DIASTOLIC BLOOD PRESSURE: 80 MMHG | HEIGHT: 63 IN | OXYGEN SATURATION: 98 % | SYSTOLIC BLOOD PRESSURE: 120 MMHG | BODY MASS INDEX: 40.36 KG/M2 | WEIGHT: 227.8 LBS | HEART RATE: 82 BPM

## 2025-04-01 DIAGNOSIS — I10 ESSENTIAL HYPERTENSION: ICD-10-CM

## 2025-04-01 DIAGNOSIS — Z12.31 ENCOUNTER FOR SCREENING MAMMOGRAM FOR MALIGNANT NEOPLASM OF BREAST: Primary | ICD-10-CM

## 2025-04-01 DIAGNOSIS — E55.9 VITAMIN D DEFICIENCY: ICD-10-CM

## 2025-04-01 DIAGNOSIS — E11.40 TYPE 2 DIABETES MELLITUS WITH DIABETIC NEUROPATHY, WITH LONG-TERM CURRENT USE OF INSULIN (HCC): ICD-10-CM

## 2025-04-01 DIAGNOSIS — E66.813 OBESITY, CLASS 3: ICD-10-CM

## 2025-04-01 DIAGNOSIS — C67.3 MALIGNANT NEOPLASM OF ANTERIOR WALL OF URINARY BLADDER (HCC): ICD-10-CM

## 2025-04-01 DIAGNOSIS — Z79.4 TYPE 2 DIABETES MELLITUS WITH DIABETIC NEUROPATHY, WITH LONG-TERM CURRENT USE OF INSULIN (HCC): ICD-10-CM

## 2025-04-01 DIAGNOSIS — E89.0 POSTOPERATIVE HYPOTHYROIDISM: ICD-10-CM

## 2025-04-01 DIAGNOSIS — E78.2 MIXED HYPERLIPIDEMIA: ICD-10-CM

## 2025-04-01 RX ORDER — LEVOTHYROXINE SODIUM 125 MCG
125 TABLET ORAL DAILY
Qty: 90 TABLET | Refills: 3 | Status: SHIPPED | OUTPATIENT
Start: 2025-04-01

## 2025-04-01 ASSESSMENT — ENCOUNTER SYMPTOMS
STRIDOR: 0
CONSTIPATION: 0
CHEST TIGHTNESS: 0
SINUS PRESSURE: 0
VOICE CHANGE: 0
WHEEZING: 0
RHINORRHEA: 0
ANAL BLEEDING: 0
COUGH: 0
EYE PAIN: 0
EYE ITCHING: 0
BLOOD IN STOOL: 0
ALLERGIC/IMMUNOLOGIC NEGATIVE: 1
PHOTOPHOBIA: 0
TROUBLE SWALLOWING: 0
SHORTNESS OF BREATH: 0
RECTAL PAIN: 0
CHOKING: 0
ABDOMINAL DISTENTION: 0
EYE REDNESS: 0
ABDOMINAL PAIN: 0
VOMITING: 0
SORE THROAT: 0
DIARRHEA: 0
EYE DISCHARGE: 0
APNEA: 0
BACK PAIN: 0
COLOR CHANGE: 0
NAUSEA: 0
FACIAL SWELLING: 0

## 2025-04-01 NOTE — PROGRESS NOTES
Subjective:     Patient ID:Leena Melendrez is a 64 y.o. female.    Foot Pain   Pertinent negatives include no fever or numbness.     Diabetes Mellitus Type II:  Home blood sugar records: trend: stable.  No significant episodes of hypoglycemia.  No polyuria, polydipsia, visual changes, foot problems, GI upset.  Diabetic diet compliance:  compliant most of the time,  Weight trend: stable.  Current exercise: walking.  Eye exam current (within one year): yes.    Hypertension:  Denies CP, SOB, visual changes, dizziness, palpitations or HA.  She is adherent to a low sodium diet.  Blood pressure typically runs ? outside of the office.     Hyperlipidemia:  No new myalgias or GI upset on atorvastatin (Lipitor).     Allergies   Allergen Reactions    Penicillins     Tetracyclines & Related Other (See Comments)     Vertigo and red eyes    Sulfa Antibiotics Rash       Current Outpatient Medications   Medication Sig Dispense Refill    SYNTHROID 125 MCG tablet Take 1 tablet by mouth daily Take with water on an empty stomach- wait 30 minutes before eating or taking other meds. 90 tablet 3    insulin lispro (HUMALOG,ADMELOG) 100 UNIT/ML SOLN injection vial INJECT 120-150 UNITS DAILY 40 mL 3    metFORMIN (GLUCOPHAGE) 1000 MG tablet TAKE 1 TABLET BY MOUTH TWICE A DAY WITH FOOD 60 tablet 0    losartan-hydroCHLOROthiazide (HYZAAR) 100-25 MG per tablet TAKE 1 TABLET BY MOUTH DAILY 90 tablet 3    JARDIANCE 10 MG tablet TAKE ONE TABLET BY MOUTH EVERY DAY 90 tablet 0    dulaglutide (TRULICITY) 0.75 MG/0.5ML SOAJ SC injection Inject 0.5 mLs into the skin once a week 4 Adjustable Dose Pre-filled Pen Syringe 3    atorvastatin (LIPITOR) 20 MG tablet TAKE 1 TABLET BY MOUTH DAILY 90 tablet 3    liothyronine (CYTOMEL) 5 MCG tablet Take 1 tablet by mouth daily 90 tablet 3    vitamin D (ERGOCALCIFEROL) 1.25 MG (15597 UT) CAPS capsule Take 1 capsule by mouth Once a week at 5 PM 12 capsule 3    ketoconazole (NIZORAL) 2 % cream Apply topically daily. 30

## 2025-04-04 ENCOUNTER — RESULTS FOLLOW-UP (OUTPATIENT)
Dept: FAMILY MEDICINE CLINIC | Age: 65
End: 2025-04-04

## 2025-04-04 DIAGNOSIS — E11.40 TYPE 2 DIABETES MELLITUS WITH DIABETIC NEUROPATHY, WITH LONG-TERM CURRENT USE OF INSULIN (HCC): ICD-10-CM

## 2025-04-04 DIAGNOSIS — Z79.4 TYPE 2 DIABETES MELLITUS WITH DIABETIC NEUROPATHY, WITH LONG-TERM CURRENT USE OF INSULIN (HCC): ICD-10-CM

## 2025-04-04 DIAGNOSIS — I10 ESSENTIAL HYPERTENSION: ICD-10-CM

## 2025-04-04 DIAGNOSIS — E55.9 VITAMIN D DEFICIENCY: ICD-10-CM

## 2025-04-04 LAB
25(OH)D3 SERPL-MCNC: 58.5 NG/ML
ALBUMIN SERPL-MCNC: 4.7 G/DL (ref 3.4–5)
ALBUMIN/GLOB SERPL: 2.1 {RATIO} (ref 1.1–2.2)
ALP SERPL-CCNC: 78 U/L (ref 40–129)
ALT SERPL-CCNC: 19 U/L (ref 10–40)
ANION GAP SERPL CALCULATED.3IONS-SCNC: 13 MMOL/L (ref 3–16)
AST SERPL-CCNC: 15 U/L (ref 15–37)
BACTERIA URNS QL MICRO: ABNORMAL /HPF
BASOPHILS # BLD: 0 K/UL (ref 0–0.2)
BASOPHILS NFR BLD: 0.4 %
BILIRUB SERPL-MCNC: 0.7 MG/DL (ref 0–1)
BILIRUB UR QL STRIP.AUTO: NEGATIVE
BUN SERPL-MCNC: 19 MG/DL (ref 7–20)
CALCIUM SERPL-MCNC: 9.8 MG/DL (ref 8.3–10.6)
CHLORIDE SERPL-SCNC: 103 MMOL/L (ref 99–110)
CHOLEST SERPL-MCNC: 159 MG/DL (ref 0–199)
CLARITY UR: ABNORMAL
CO2 SERPL-SCNC: 26 MMOL/L (ref 21–32)
COLOR UR: ABNORMAL
CREAT SERPL-MCNC: 0.7 MG/DL (ref 0.6–1.2)
DEPRECATED RDW RBC AUTO: 14.7 % (ref 12.4–15.4)
EOSINOPHIL # BLD: 0.2 K/UL (ref 0–0.6)
EOSINOPHIL NFR BLD: 2.6 %
EPI CELLS #/AREA URNS AUTO: 4 /HPF (ref 0–5)
GFR SERPLBLD CREATININE-BSD FMLA CKD-EPI: >90 ML/MIN/{1.73_M2}
GLUCOSE SERPL-MCNC: 109 MG/DL (ref 70–99)
GLUCOSE UR STRIP.AUTO-MCNC: >=1000 MG/DL
HCT VFR BLD AUTO: 41.6 % (ref 36–48)
HDLC SERPL-MCNC: 32 MG/DL (ref 40–60)
HGB BLD-MCNC: 13.8 G/DL (ref 12–16)
HGB UR QL STRIP.AUTO: ABNORMAL
HYALINE CASTS #/AREA URNS AUTO: 2 /LPF (ref 0–8)
KETONES UR STRIP.AUTO-MCNC: NEGATIVE MG/DL
LDLC SERPL CALC-MCNC: 94 MG/DL
LEUKOCYTE ESTERASE UR QL STRIP.AUTO: NEGATIVE
LYMPHOCYTES # BLD: 1.7 K/UL (ref 1–5.1)
LYMPHOCYTES NFR BLD: 24.5 %
MCH RBC QN AUTO: 29.2 PG (ref 26–34)
MCHC RBC AUTO-ENTMCNC: 33.3 G/DL (ref 31–36)
MCV RBC AUTO: 87.8 FL (ref 80–100)
MONOCYTES # BLD: 0.7 K/UL (ref 0–1.3)
MONOCYTES NFR BLD: 10.5 %
NEUTROPHILS # BLD: 4.3 K/UL (ref 1.7–7.7)
NEUTROPHILS NFR BLD: 62 %
NITRITE UR QL STRIP.AUTO: NEGATIVE
PH UR STRIP.AUTO: 5.5 [PH] (ref 5–8)
PLATELET # BLD AUTO: 317 K/UL (ref 135–450)
PMV BLD AUTO: 8.4 FL (ref 5–10.5)
POTASSIUM SERPL-SCNC: 4.2 MMOL/L (ref 3.5–5.1)
PROT SERPL-MCNC: 6.9 G/DL (ref 6.4–8.2)
PROT UR STRIP.AUTO-MCNC: ABNORMAL MG/DL
RBC # BLD AUTO: 4.74 M/UL (ref 4–5.2)
RBC CLUMPS #/AREA URNS AUTO: 8 /HPF (ref 0–4)
SODIUM SERPL-SCNC: 142 MMOL/L (ref 136–145)
SP GR UR STRIP.AUTO: 1.04 (ref 1–1.03)
TRIGL SERPL-MCNC: 166 MG/DL (ref 0–150)
UA COMPLETE W REFLEX CULTURE PNL UR: ABNORMAL
UA DIPSTICK W REFLEX MICRO PNL UR: YES
URN SPEC COLLECT METH UR: ABNORMAL
UROBILINOGEN UR STRIP-ACNC: 1 E.U./DL
VIT B12 SERPL-MCNC: 1263 PG/ML (ref 211–911)
VLDLC SERPL CALC-MCNC: 33 MG/DL
WBC # BLD AUTO: 7 K/UL (ref 4–11)
WBC #/AREA URNS AUTO: 3 /HPF (ref 0–5)

## 2025-04-21 ENCOUNTER — TRANSCRIBE ORDERS (OUTPATIENT)
Dept: ADMINISTRATIVE | Age: 65
End: 2025-04-21

## 2025-04-21 DIAGNOSIS — R31.29 MICROSCOPIC HEMATURIA: Primary | ICD-10-CM

## 2025-04-22 ENCOUNTER — OFFICE VISIT (OUTPATIENT)
Dept: ENDOCRINOLOGY | Age: 65
End: 2025-04-22
Payer: COMMERCIAL

## 2025-04-22 VITALS
HEIGHT: 63 IN | BODY MASS INDEX: 40.33 KG/M2 | SYSTOLIC BLOOD PRESSURE: 125 MMHG | WEIGHT: 227.6 LBS | HEART RATE: 66 BPM | DIASTOLIC BLOOD PRESSURE: 65 MMHG

## 2025-04-22 DIAGNOSIS — E89.0 POSTOPERATIVE HYPOTHYROIDISM: Primary | ICD-10-CM

## 2025-04-22 DIAGNOSIS — E11.9 CONTROLLED TYPE 2 DIABETES MELLITUS WITHOUT COMPLICATION, WITH LONG-TERM CURRENT USE OF INSULIN (HCC): ICD-10-CM

## 2025-04-22 DIAGNOSIS — Z79.4 CONTROLLED TYPE 2 DIABETES MELLITUS WITHOUT COMPLICATION, WITH LONG-TERM CURRENT USE OF INSULIN (HCC): ICD-10-CM

## 2025-04-22 LAB — HBA1C MFR BLD: 6.9 %

## 2025-04-22 PROCEDURE — 99214 OFFICE O/P EST MOD 30 MIN: CPT | Performed by: INTERNAL MEDICINE

## 2025-04-22 PROCEDURE — 83036 HEMOGLOBIN GLYCOSYLATED A1C: CPT | Performed by: INTERNAL MEDICINE

## 2025-04-22 PROCEDURE — 3074F SYST BP LT 130 MM HG: CPT | Performed by: INTERNAL MEDICINE

## 2025-04-22 PROCEDURE — 3078F DIAST BP <80 MM HG: CPT | Performed by: INTERNAL MEDICINE

## 2025-04-22 PROCEDURE — 3044F HG A1C LEVEL LT 7.0%: CPT | Performed by: INTERNAL MEDICINE

## 2025-04-22 PROCEDURE — 95251 CONT GLUC MNTR ANALYSIS I&R: CPT | Performed by: INTERNAL MEDICINE

## 2025-04-22 RX ORDER — ERGOCALCIFEROL 1.25 MG/1
CAPSULE, LIQUID FILLED ORAL
Qty: 12 CAPSULE | Refills: 3 | Status: SHIPPED | OUTPATIENT
Start: 2025-04-22

## 2025-04-22 NOTE — PROGRESS NOTES
Seen as  patient for diabetes      Interim:    Using Synthroid now  States noticed some tendinitis and weight gain when on levothyroxine    Needs reservoir changed every 2 days  TDD insulin around 150 units / day    Diagnosed with Type 2 diabetes mellitus in  2005  Known diabetic complications: none   Uncontrolled, moderate    Current diabetic medications     Metformin 1gm BID  jardiance    Trulicity 0.75 mg    Insulin pump  basal as:    basal as:     MN   4.2  6 am 2.5  5:30 pm 2.8    I:C   3.5  CF 22  Target 110-120    Last A1c 6.9%<----6.9%<---6.8%<----7.9%<---- 8.3%<----7.1%<-----8% <-----7.4%<---7%<-----7.1%<---- 6.4%<--- 6.6%<----- 6.8% < ---- 7%<----6.8%<---- 8.2%<----7.3%<-----   7.6%<--- 7.9 <--- -8.3    Moderate, controlled    Prior visit with dietician: no  Current diet: on average, 3 meals per day   Current exercise: walking   Current monitoring regimen: home blood tests -     CGM  Last 2 weeks  Average 144    87% in range  13 % high    Any episodes of hypoglycemia? No recent    No Hx of CAD , PVD, CVA    Hyperlipidemia:   For   Years  Takes lipitor 20mg  Controlled  LDL 76 on 3/19   LDL 78 on 10/20  LDL 99 on 12/22    Hypertension for years  Stable  Takes  Losartan 100mg    Last eye exam: 10/23  eye injection for occluded vein  Last foot exam: 1/25  Last microalbumin to creatinine ratio:  2/24    She has a h/o hypothyroidism    H/o QUIJANO for hyperthyroidism    S/p Thyroidectomy for thyroid Nodule: had atypical cells on FNA and patient had surgery with Dr Orozco in 2/2013, normal path, Hurtle cell adenoma;     TSH 2.68 on 3/19  TSH 1.84 on 9/20  TSH 1.0 on 10/21  TSH 2.3 on 12/22    Synthroid 125mcg cytomel 5mcg daily    She is taking Vit D 50,000 IU , weekly    Past Medical History:   Diagnosis Date    Cystic acne     Diverticulitis large intestine 07/07/2010    Diverticulosis of colon     Episcleritis     HTN (hypertension) 12/06/2012    Hyperemesis gravidarum     Hyperlipidemia     Hyperplastic

## 2025-04-30 ENCOUNTER — HOSPITAL ENCOUNTER (OUTPATIENT)
Dept: CT IMAGING | Age: 65
Discharge: HOME | End: 2025-04-30
Payer: COMMERCIAL

## 2025-04-30 DIAGNOSIS — R31.29 MICROSCOPIC HEMATURIA: ICD-10-CM

## 2025-04-30 PROCEDURE — 6360000004 HC RX CONTRAST MEDICATION

## 2025-04-30 PROCEDURE — 74178 CT ABD&PLV WO CNTR FLWD CNTR: CPT

## 2025-04-30 RX ORDER — IOPAMIDOL 755 MG/ML
120 INJECTION, SOLUTION INTRAVASCULAR
Status: COMPLETED | OUTPATIENT
Start: 2025-04-30 | End: 2025-04-30

## 2025-04-30 RX ADMIN — IOPAMIDOL 120 ML: 755 INJECTION, SOLUTION INTRAVENOUS at 07:01

## 2025-05-08 ENCOUNTER — PATIENT MESSAGE (OUTPATIENT)
Dept: ENDOCRINOLOGY | Age: 65
End: 2025-05-08

## 2025-05-11 DIAGNOSIS — E11.65 UNCONTROLLED TYPE 2 DIABETES MELLITUS WITH HYPERGLYCEMIA (HCC): ICD-10-CM

## 2025-05-12 ENCOUNTER — TELEPHONE (OUTPATIENT)
Dept: ENDOCRINOLOGY | Age: 65
End: 2025-05-12

## 2025-05-12 DIAGNOSIS — E89.0 POSTOPERATIVE HYPOTHYROIDISM: ICD-10-CM

## 2025-05-12 DIAGNOSIS — E11.9 DIABETES MELLITUS TYPE 2, INSULIN DEPENDENT (HCC): ICD-10-CM

## 2025-05-12 DIAGNOSIS — Z79.4 DIABETES MELLITUS TYPE 2, INSULIN DEPENDENT (HCC): ICD-10-CM

## 2025-05-12 RX ORDER — DULAGLUTIDE 0.75 MG/.5ML
INJECTION, SOLUTION SUBCUTANEOUS
Qty: 4 ADJUSTABLE DOSE PRE-FILLED PEN SYRINGE | Refills: 1 | Status: SHIPPED | OUTPATIENT
Start: 2025-05-12

## 2025-05-12 RX ORDER — EMPAGLIFLOZIN 10 MG/1
10 TABLET, FILM COATED ORAL DAILY
Qty: 90 TABLET | Refills: 0 | Status: SHIPPED | OUTPATIENT
Start: 2025-05-12

## 2025-05-13 NOTE — TELEPHONE ENCOUNTER
Submitted PA for Jardiance  Via CMM Key: [id number] STATUS: PENDING.    Follow up done daily; if no decision with in three days we will refax.  If another three days goes by with no decision will call the insurance for status.

## 2025-05-19 NOTE — TELEPHONE ENCOUNTER
Call from [employer] on status of form that was faxed on 5/5. They will be faxing over new form since prev message states that it was hard to read      # [phone]   Ext [phone]  Caller was Carlos

## 2025-07-13 DIAGNOSIS — E11.9 DIABETES MELLITUS TYPE 2, INSULIN DEPENDENT (HCC): ICD-10-CM

## 2025-07-13 DIAGNOSIS — Z79.4 DIABETES MELLITUS TYPE 2, INSULIN DEPENDENT (HCC): ICD-10-CM

## 2025-07-13 DIAGNOSIS — E89.0 POSTOPERATIVE HYPOTHYROIDISM: ICD-10-CM

## 2025-07-14 RX ORDER — DULAGLUTIDE 0.75 MG/.5ML
INJECTION, SOLUTION SUBCUTANEOUS
Qty: 2 ML | Refills: 1 | Status: SHIPPED | OUTPATIENT
Start: 2025-07-14

## 2025-07-14 NOTE — TELEPHONE ENCOUNTER
Medication:   Requested Prescriptions     Pending Prescriptions Disp Refills    TRULICITY 0.75 MG/0.5ML SOAJ SC injection [Pharmacy Med Name: TRULICITY 0.75 MG/0.5 ML PEN] 2 mL      Sig: INJECT 0.75 MG UNDER THE SKIN ONCE WEEKLY       Last Filled:      Patient Phone Number: [phone] (home)     Last appt: 4/22/2025   Next appt: 7/22/2025    Last Labs DM:   Lab Results   Component Value Date/Time    LABA1C 6.9 04/22/2025 04:20 PM

## 2025-07-21 DIAGNOSIS — E11.9 DIABETES MELLITUS TYPE 2, INSULIN DEPENDENT (HCC): Primary | ICD-10-CM

## 2025-07-21 DIAGNOSIS — Z79.4 DIABETES MELLITUS TYPE 2, INSULIN DEPENDENT (HCC): Primary | ICD-10-CM

## 2025-07-21 RX ORDER — INSULIN LISPRO 100 [IU]/ML
INJECTION, SOLUTION INTRAVENOUS; SUBCUTANEOUS
Qty: 40 ML | Refills: 3 | Status: SHIPPED | OUTPATIENT
Start: 2025-07-21

## 2025-07-21 NOTE — TELEPHONE ENCOUNTER
Medication:   Requested Prescriptions     Pending Prescriptions Disp Refills    insulin lispro (HUMALOG,ADMELOG) 100 UNIT/ML SOLN injection vial [Pharmacy Med Name: INSULIN LISPRO 100 UNIT/ML VL] 40 mL 3     Sig: INJECT 120-150 UNITS DAILY       Last Filled:      Patient Phone Number: [phone] (home)     Last appt: 4/22/2025   Next appt: 7/22/2025    Last Labs DM:   Lab Results   Component Value Date/Time    LABA1C 6.9 04/22/2025 04:20 PM

## 2025-07-22 ENCOUNTER — OFFICE VISIT (OUTPATIENT)
Dept: ENDOCRINOLOGY | Age: 65
End: 2025-07-22
Payer: COMMERCIAL

## 2025-07-22 VITALS
SYSTOLIC BLOOD PRESSURE: 135 MMHG | BODY MASS INDEX: 40.4 KG/M2 | DIASTOLIC BLOOD PRESSURE: 90 MMHG | WEIGHT: 228 LBS | HEART RATE: 80 BPM | OXYGEN SATURATION: 97 %

## 2025-07-22 DIAGNOSIS — E89.0 POSTOPERATIVE HYPOTHYROIDISM: ICD-10-CM

## 2025-07-22 DIAGNOSIS — E11.65 UNCONTROLLED TYPE 2 DIABETES MELLITUS WITH HYPERGLYCEMIA (HCC): Primary | ICD-10-CM

## 2025-07-22 LAB — HBA1C MFR BLD: 6.8 %

## 2025-07-22 PROCEDURE — 99214 OFFICE O/P EST MOD 30 MIN: CPT | Performed by: INTERNAL MEDICINE

## 2025-07-22 PROCEDURE — 95251 CONT GLUC MNTR ANALYSIS I&R: CPT | Performed by: INTERNAL MEDICINE

## 2025-07-22 PROCEDURE — 3080F DIAST BP >= 90 MM HG: CPT | Performed by: INTERNAL MEDICINE

## 2025-07-22 PROCEDURE — 3075F SYST BP GE 130 - 139MM HG: CPT | Performed by: INTERNAL MEDICINE

## 2025-07-22 PROCEDURE — 83036 HEMOGLOBIN GLYCOSYLATED A1C: CPT | Performed by: INTERNAL MEDICINE

## 2025-07-22 PROCEDURE — 3044F HG A1C LEVEL LT 7.0%: CPT | Performed by: INTERNAL MEDICINE

## 2025-07-22 NOTE — PROGRESS NOTES
Seen as  patient for diabetes      Interim:    stable  Using Synthroid now  States noticed some tendinitis and weight gain when on levothyroxine  Report increase appetite  Inquires about other GLP-1 agonist    Needs reservoir changed every 2 days  TDD insulin around 150 units / day    Diagnosed with Type 2 diabetes mellitus in  2005  Known diabetic complications: none   Uncontrolled, moderate    Current diabetic medications     Metformin 1gm BID  jardiance    Trulicity 0.75 mg    H/o intolerance to victoza    Insulin pump  basal as:    basal as:     MN   4.2  6 am 2.5  5:30 pm 2.8    I:C   3.5  CF 22  Target 110-120    Last A1c 6.8%<----6.9%<----6.9%<---6.8%<----7.9%<---- 8.3%<----7.1%<-----8% <-----7.4%<---7%<-----7.1%<---- 6.4%<--- 6.6%<----- 6.8% < ---- 7%<----6.8%<---- 8.2%<----7.3%<-----   7.6%<--- 7.9 <--- -8.3    Moderate, controlled    Prior visit with dietician: no  Current diet: on average, 3 meals per day   Current exercise: walking   Current monitoring regimen: home blood tests -     CGM  Last 2 weeks  Average 148    82% in range  18 % high    Any episodes of hypoglycemia? No recent    No Hx of CAD , PVD, CVA    Hyperlipidemia:   For   Years  Takes lipitor 20mg  Controlled  LDL 76 on 3/19   LDL 78 on 10/20  LDL 99 on 12/22    Hypertension for years  Stable  Takes  Losartan 100mg    Last eye exam: 5/25  eye injection for occluded vein  Last foot exam: 1/25  Last microalbumin to creatinine ratio:  1/25    She has a h/o hypothyroidism    H/o QUIJANO for hyperthyroidism    S/p Thyroidectomy for thyroid Nodule: had atypical cells on FNA and patient had surgery with Dr Orozco in 2/2013, normal path, Hurtle cell adenoma;     TSH 2.68 on 3/19  TSH 1.84 on 9/20  TSH 1.0 on 10/21  TSH 2.3 on 12/22    Synthroid 125mcg cytomel 5mcg daily    She is taking Vit D 50,000 IU , weekly    Past Medical History:   Diagnosis Date    Cystic acne     Diverticulitis large intestine 07/07/2010    Diverticulosis of colon

## 2025-08-15 ENCOUNTER — OFFICE VISIT (OUTPATIENT)
Dept: FAMILY MEDICINE CLINIC | Age: 65
End: 2025-08-15
Payer: COMMERCIAL

## 2025-08-15 VITALS
WEIGHT: 225 LBS | HEIGHT: 62 IN | TEMPERATURE: 97.3 F | OXYGEN SATURATION: 98 % | BODY MASS INDEX: 41.41 KG/M2 | DIASTOLIC BLOOD PRESSURE: 78 MMHG | SYSTOLIC BLOOD PRESSURE: 122 MMHG | HEART RATE: 66 BPM

## 2025-08-15 DIAGNOSIS — E11.9 DIABETES MELLITUS TYPE 2, INSULIN DEPENDENT (HCC): ICD-10-CM

## 2025-08-15 DIAGNOSIS — E55.9 VITAMIN D DEFICIENCY: ICD-10-CM

## 2025-08-15 DIAGNOSIS — I10 ESSENTIAL HYPERTENSION: ICD-10-CM

## 2025-08-15 DIAGNOSIS — Z79.4 TYPE 2 DIABETES MELLITUS WITH DIABETIC NEUROPATHY, WITH LONG-TERM CURRENT USE OF INSULIN (HCC): ICD-10-CM

## 2025-08-15 DIAGNOSIS — Z79.4 DIABETES MELLITUS TYPE 2, INSULIN DEPENDENT (HCC): ICD-10-CM

## 2025-08-15 DIAGNOSIS — E89.0 POSTOPERATIVE HYPOTHYROIDISM: ICD-10-CM

## 2025-08-15 DIAGNOSIS — R25.2 MUSCLE CRAMPING: ICD-10-CM

## 2025-08-15 DIAGNOSIS — R25.2 MUSCLE CRAMPING: Primary | ICD-10-CM

## 2025-08-15 DIAGNOSIS — E11.40 TYPE 2 DIABETES MELLITUS WITH DIABETIC NEUROPATHY, WITH LONG-TERM CURRENT USE OF INSULIN (HCC): ICD-10-CM

## 2025-08-15 LAB
25(OH)D3 SERPL-MCNC: 63.5 NG/ML
ALBUMIN SERPL-MCNC: 4.9 G/DL (ref 3.4–5)
ALBUMIN/GLOB SERPL: 2 {RATIO} (ref 1.1–2.2)
ALP SERPL-CCNC: 85 U/L (ref 40–129)
ALT SERPL-CCNC: 18 U/L (ref 10–40)
ANION GAP SERPL CALCULATED.3IONS-SCNC: 15 MMOL/L (ref 3–16)
AST SERPL-CCNC: 17 U/L (ref 15–37)
BASOPHILS # BLD: 0.1 K/UL (ref 0–0.2)
BASOPHILS NFR BLD: 0.5 %
BILIRUB SERPL-MCNC: 0.5 MG/DL (ref 0–1)
BUN SERPL-MCNC: 21 MG/DL (ref 7–20)
CALCIUM SERPL-MCNC: 10.4 MG/DL (ref 8.3–10.6)
CHLORIDE SERPL-SCNC: 99 MMOL/L (ref 99–110)
CO2 SERPL-SCNC: 27 MMOL/L (ref 21–32)
CREAT SERPL-MCNC: 0.8 MG/DL (ref 0.6–1.2)
DEPRECATED RDW RBC AUTO: 14.5 % (ref 12.4–15.4)
EOSINOPHIL # BLD: 0.2 K/UL (ref 0–0.6)
EOSINOPHIL NFR BLD: 1.6 %
GFR SERPLBLD CREATININE-BSD FMLA CKD-EPI: 82 ML/MIN/{1.73_M2}
GLUCOSE SERPL-MCNC: 119 MG/DL (ref 70–99)
HCT VFR BLD AUTO: 42.7 % (ref 36–48)
HGB BLD-MCNC: 14.2 G/DL (ref 12–16)
LYMPHOCYTES # BLD: 2.2 K/UL (ref 1–5.1)
LYMPHOCYTES NFR BLD: 22 %
MAGNESIUM SERPL-MCNC: 2.24 MG/DL (ref 1.8–2.4)
MCH RBC QN AUTO: 29.1 PG (ref 26–34)
MCHC RBC AUTO-ENTMCNC: 33.3 G/DL (ref 31–36)
MCV RBC AUTO: 87.6 FL (ref 80–100)
MONOCYTES # BLD: 0.9 K/UL (ref 0–1.3)
MONOCYTES NFR BLD: 9.2 %
NEUTROPHILS # BLD: 6.7 K/UL (ref 1.7–7.7)
NEUTROPHILS NFR BLD: 66.7 %
PLATELET # BLD AUTO: 343 K/UL (ref 135–450)
PMV BLD AUTO: 8.9 FL (ref 5–10.5)
POTASSIUM SERPL-SCNC: 4.7 MMOL/L (ref 3.5–5.1)
PROT SERPL-MCNC: 7.4 G/DL (ref 6.4–8.2)
RBC # BLD AUTO: 4.88 M/UL (ref 4–5.2)
SODIUM SERPL-SCNC: 141 MMOL/L (ref 136–145)
T3FREE SERPL-MCNC: 3.1 PG/ML (ref 2.3–4.2)
T4 FREE SERPL-MCNC: 1.3 NG/DL (ref 0.9–1.8)
TSH SERPL DL<=0.005 MIU/L-ACNC: 0.74 UIU/ML (ref 0.27–4.2)
VIT B12 SERPL-MCNC: 1461 PG/ML (ref 211–911)
WBC # BLD AUTO: 10 K/UL (ref 4–11)

## 2025-08-15 PROCEDURE — 3044F HG A1C LEVEL LT 7.0%: CPT

## 2025-08-15 PROCEDURE — 99213 OFFICE O/P EST LOW 20 MIN: CPT

## 2025-08-15 PROCEDURE — 3074F SYST BP LT 130 MM HG: CPT

## 2025-08-15 PROCEDURE — 3078F DIAST BP <80 MM HG: CPT

## 2025-08-15 ASSESSMENT — ENCOUNTER SYMPTOMS
SHORTNESS OF BREATH: 0
WHEEZING: 0
BACK PAIN: 0
COLOR CHANGE: 0
CONSTIPATION: 0
NAUSEA: 0
ABDOMINAL PAIN: 0
VOMITING: 0
ABDOMINAL DISTENTION: 0
CHEST TIGHTNESS: 0
DIARRHEA: 0
COUGH: 0

## 2025-08-25 DIAGNOSIS — E89.0 POSTOPERATIVE HYPOTHYROIDISM: ICD-10-CM

## 2025-08-25 DIAGNOSIS — E11.65 UNCONTROLLED TYPE 2 DIABETES MELLITUS WITH HYPERGLYCEMIA (HCC): ICD-10-CM

## 2025-08-25 RX ORDER — TIRZEPATIDE 2.5 MG/.5ML
INJECTION, SOLUTION SUBCUTANEOUS
Qty: 2 ML | Refills: 1 | Status: SHIPPED | OUTPATIENT
Start: 2025-08-25